# Patient Record
Sex: FEMALE | Race: WHITE | NOT HISPANIC OR LATINO | Employment: OTHER | URBAN - METROPOLITAN AREA
[De-identification: names, ages, dates, MRNs, and addresses within clinical notes are randomized per-mention and may not be internally consistent; named-entity substitution may affect disease eponyms.]

---

## 2017-12-02 ENCOUNTER — HOSPITAL ENCOUNTER (INPATIENT)
Facility: HOSPITAL | Age: 72
LOS: 4 days | Discharge: HOME HEALTH | DRG: 073 | End: 2017-12-06
Attending: EMERGENCY MEDICINE | Admitting: HOSPITALIST
Payer: MEDICARE

## 2017-12-02 DIAGNOSIS — R91.8 MASS OF RIGHT LUNG: ICD-10-CM

## 2017-12-02 DIAGNOSIS — R27.0 ATAXIA: Primary | ICD-10-CM

## 2017-12-02 DIAGNOSIS — R06.02 SOB (SHORTNESS OF BREATH): ICD-10-CM

## 2017-12-02 DIAGNOSIS — E43 SEVERE MALNUTRITION: ICD-10-CM

## 2017-12-02 LAB
ALBUMIN SERPL BCP-MCNC: 2.4 G/DL
ALP SERPL-CCNC: 54 U/L
ALT SERPL W/O P-5'-P-CCNC: 7 U/L
AMORPH CRY URNS QL MICRO: ABNORMAL
ANION GAP SERPL CALC-SCNC: 10 MMOL/L
AST SERPL-CCNC: 9 U/L
BACTERIA #/AREA URNS HPF: ABNORMAL /HPF
BASOPHILS # BLD AUTO: 0 K/UL
BASOPHILS NFR BLD: 0.4 %
BILIRUB SERPL-MCNC: 0.3 MG/DL
BILIRUB UR QL STRIP: NEGATIVE
BNP SERPL-MCNC: 101 PG/ML
BUN SERPL-MCNC: 10 MG/DL
CALCIUM SERPL-MCNC: 9.2 MG/DL
CHLORIDE SERPL-SCNC: 103 MMOL/L
CLARITY UR: ABNORMAL
CO2 SERPL-SCNC: 29 MMOL/L
COLOR UR: YELLOW
CREAT SERPL-MCNC: 0.8 MG/DL
DIFFERENTIAL METHOD: ABNORMAL
EOSINOPHIL # BLD AUTO: 0.2 K/UL
EOSINOPHIL NFR BLD: 1.8 %
ERYTHROCYTE [DISTWIDTH] IN BLOOD BY AUTOMATED COUNT: 13.7 %
EST. GFR  (AFRICAN AMERICAN): >60 ML/MIN/1.73 M^2
EST. GFR  (NON AFRICAN AMERICAN): >60 ML/MIN/1.73 M^2
FLUAV AG SPEC QL IA: NEGATIVE
FLUBV AG SPEC QL IA: NEGATIVE
GLUCOSE SERPL-MCNC: 114 MG/DL
GLUCOSE UR QL STRIP: NEGATIVE
HCT VFR BLD AUTO: 35.4 %
HGB BLD-MCNC: 11.7 G/DL
HGB UR QL STRIP: NEGATIVE
KETONES UR QL STRIP: NEGATIVE
LACTATE SERPL-SCNC: 0.8 MMOL/L
LEUKOCYTE ESTERASE UR QL STRIP: ABNORMAL
LYMPHOCYTES # BLD AUTO: 2.1 K/UL
LYMPHOCYTES NFR BLD: 22 %
MCH RBC QN AUTO: 30.7 PG
MCHC RBC AUTO-ENTMCNC: 33 G/DL
MCV RBC AUTO: 93 FL
MICROSCOPIC COMMENT: ABNORMAL
MONOCYTES # BLD AUTO: 0.3 K/UL
MONOCYTES NFR BLD: 3.5 %
NEUTROPHILS # BLD AUTO: 7 K/UL
NEUTROPHILS NFR BLD: 72.3 %
NITRITE UR QL STRIP: NEGATIVE
PH UR STRIP: 7 [PH] (ref 5–8)
PLATELET # BLD AUTO: 362 K/UL
PMV BLD AUTO: 7.1 FL
POTASSIUM SERPL-SCNC: 3.8 MMOL/L
PROT SERPL-MCNC: 6.9 G/DL
PROT UR QL STRIP: NEGATIVE
RBC # BLD AUTO: 3.81 M/UL
RBC #/AREA URNS HPF: 3 /HPF (ref 0–4)
SODIUM SERPL-SCNC: 142 MMOL/L
SP GR UR STRIP: 1.01 (ref 1–1.03)
SPECIMEN SOURCE: NORMAL
SQUAMOUS #/AREA URNS HPF: 23 /HPF
TROPONIN I SERPL DL<=0.01 NG/ML-MCNC: <0.006 NG/ML
URN SPEC COLLECT METH UR: ABNORMAL
UROBILINOGEN UR STRIP-ACNC: NEGATIVE EU/DL
WBC # BLD AUTO: 9.6 K/UL
WBC #/AREA URNS HPF: 32 /HPF (ref 0–5)

## 2017-12-02 PROCEDURE — 83880 ASSAY OF NATRIURETIC PEPTIDE: CPT

## 2017-12-02 PROCEDURE — 36415 COLL VENOUS BLD VENIPUNCTURE: CPT

## 2017-12-02 PROCEDURE — 85025 COMPLETE CBC W/AUTO DIFF WBC: CPT

## 2017-12-02 PROCEDURE — 93010 ELECTROCARDIOGRAM REPORT: CPT | Mod: ,,, | Performed by: INTERNAL MEDICINE

## 2017-12-02 PROCEDURE — 83605 ASSAY OF LACTIC ACID: CPT

## 2017-12-02 PROCEDURE — 12000002 HC ACUTE/MED SURGE SEMI-PRIVATE ROOM

## 2017-12-02 PROCEDURE — 87086 URINE CULTURE/COLONY COUNT: CPT

## 2017-12-02 PROCEDURE — 81000 URINALYSIS NONAUTO W/SCOPE: CPT

## 2017-12-02 PROCEDURE — 87186 SC STD MICRODIL/AGAR DIL: CPT

## 2017-12-02 PROCEDURE — 84484 ASSAY OF TROPONIN QUANT: CPT

## 2017-12-02 PROCEDURE — 80053 COMPREHEN METABOLIC PANEL: CPT

## 2017-12-02 PROCEDURE — 25000003 PHARM REV CODE 250: Performed by: HOSPITALIST

## 2017-12-02 PROCEDURE — G0378 HOSPITAL OBSERVATION PER HR: HCPCS

## 2017-12-02 PROCEDURE — 63600175 PHARM REV CODE 636 W HCPCS: Performed by: HOSPITALIST

## 2017-12-02 PROCEDURE — 87088 URINE BACTERIA CULTURE: CPT

## 2017-12-02 PROCEDURE — 93005 ELECTROCARDIOGRAM TRACING: CPT

## 2017-12-02 PROCEDURE — 87040 BLOOD CULTURE FOR BACTERIA: CPT | Mod: 59

## 2017-12-02 PROCEDURE — 87077 CULTURE AEROBIC IDENTIFY: CPT

## 2017-12-02 PROCEDURE — 87400 INFLUENZA A/B EACH AG IA: CPT | Mod: 59

## 2017-12-02 PROCEDURE — 99285 EMERGENCY DEPT VISIT HI MDM: CPT | Mod: 25

## 2017-12-02 PROCEDURE — 63600175 PHARM REV CODE 636 W HCPCS: Performed by: EMERGENCY MEDICINE

## 2017-12-02 RX ORDER — SODIUM CHLORIDE 9 MG/ML
INJECTION, SOLUTION INTRAVENOUS CONTINUOUS
Status: DISCONTINUED | OUTPATIENT
Start: 2017-12-02 | End: 2017-12-03

## 2017-12-02 RX ORDER — LANOLIN ALCOHOL/MO/W.PET/CERES
800 CREAM (GRAM) TOPICAL
Status: DISCONTINUED | OUTPATIENT
Start: 2017-12-02 | End: 2017-12-06 | Stop reason: HOSPADM

## 2017-12-02 RX ORDER — MIRTAZAPINE 15 MG/1
30 TABLET, FILM COATED ORAL NIGHTLY
Status: DISCONTINUED | OUTPATIENT
Start: 2017-12-02 | End: 2017-12-06 | Stop reason: HOSPADM

## 2017-12-02 RX ORDER — OXYCODONE AND ACETAMINOPHEN 10; 325 MG/1; MG/1
1 TABLET ORAL EVERY 8 HOURS PRN
Status: DISCONTINUED | OUTPATIENT
Start: 2017-12-02 | End: 2017-12-03

## 2017-12-02 RX ORDER — SODIUM CHLORIDE 0.9 % (FLUSH) 0.9 %
5 SYRINGE (ML) INJECTION
Status: DISCONTINUED | OUTPATIENT
Start: 2017-12-02 | End: 2017-12-06 | Stop reason: HOSPADM

## 2017-12-02 RX ORDER — IBUPROFEN 200 MG
16 TABLET ORAL
Status: DISCONTINUED | OUTPATIENT
Start: 2017-12-02 | End: 2017-12-06 | Stop reason: HOSPADM

## 2017-12-02 RX ORDER — ACETAMINOPHEN 325 MG/1
650 TABLET ORAL EVERY 6 HOURS PRN
Status: DISCONTINUED | OUTPATIENT
Start: 2017-12-02 | End: 2017-12-06 | Stop reason: HOSPADM

## 2017-12-02 RX ORDER — PREDNISONE 20 MG/1
60 TABLET ORAL
Status: COMPLETED | OUTPATIENT
Start: 2017-12-02 | End: 2017-12-02

## 2017-12-02 RX ORDER — GABAPENTIN 300 MG/1
300 CAPSULE ORAL 3 TIMES DAILY
Status: DISCONTINUED | OUTPATIENT
Start: 2017-12-02 | End: 2017-12-06 | Stop reason: HOSPADM

## 2017-12-02 RX ORDER — ENOXAPARIN SODIUM 100 MG/ML
40 INJECTION SUBCUTANEOUS EVERY 24 HOURS
Status: DISCONTINUED | OUTPATIENT
Start: 2017-12-02 | End: 2017-12-06 | Stop reason: HOSPADM

## 2017-12-02 RX ORDER — IPRATROPIUM BROMIDE AND ALBUTEROL SULFATE 2.5; .5 MG/3ML; MG/3ML
3 SOLUTION RESPIRATORY (INHALATION) EVERY 4 HOURS PRN
Status: DISCONTINUED | OUTPATIENT
Start: 2017-12-02 | End: 2017-12-02

## 2017-12-02 RX ORDER — ATENOLOL 25 MG/1
25 TABLET ORAL DAILY
COMMUNITY
End: 2018-12-19 | Stop reason: CLARIF

## 2017-12-02 RX ORDER — IPRATROPIUM BROMIDE AND ALBUTEROL SULFATE 2.5; .5 MG/3ML; MG/3ML
3 SOLUTION RESPIRATORY (INHALATION) EVERY 4 HOURS PRN
Status: DISCONTINUED | OUTPATIENT
Start: 2017-12-02 | End: 2017-12-06 | Stop reason: HOSPADM

## 2017-12-02 RX ORDER — IBUPROFEN 200 MG
24 TABLET ORAL
Status: DISCONTINUED | OUTPATIENT
Start: 2017-12-02 | End: 2017-12-06 | Stop reason: HOSPADM

## 2017-12-02 RX ORDER — DULOXETIN HYDROCHLORIDE 30 MG/1
90 CAPSULE, DELAYED RELEASE ORAL DAILY
Status: DISCONTINUED | OUTPATIENT
Start: 2017-12-03 | End: 2017-12-06 | Stop reason: HOSPADM

## 2017-12-02 RX ORDER — GLUCAGON 1 MG
1 KIT INJECTION
Status: DISCONTINUED | OUTPATIENT
Start: 2017-12-02 | End: 2017-12-06 | Stop reason: HOSPADM

## 2017-12-02 RX ORDER — POTASSIUM CHLORIDE 20 MEQ/15ML
60 SOLUTION ORAL
Status: DISCONTINUED | OUTPATIENT
Start: 2017-12-02 | End: 2017-12-06 | Stop reason: HOSPADM

## 2017-12-02 RX ORDER — ONDANSETRON 2 MG/ML
8 INJECTION INTRAMUSCULAR; INTRAVENOUS EVERY 8 HOURS PRN
Status: DISCONTINUED | OUTPATIENT
Start: 2017-12-02 | End: 2017-12-06 | Stop reason: HOSPADM

## 2017-12-02 RX ORDER — POTASSIUM CHLORIDE 20 MEQ/15ML
40 SOLUTION ORAL
Status: DISCONTINUED | OUTPATIENT
Start: 2017-12-02 | End: 2017-12-06 | Stop reason: HOSPADM

## 2017-12-02 RX ADMIN — PREDNISONE 60 MG: 20 TABLET ORAL at 04:12

## 2017-12-02 RX ADMIN — SODIUM CHLORIDE: 9 INJECTION, SOLUTION INTRAVENOUS at 07:12

## 2017-12-02 RX ADMIN — MIRTAZAPINE 30 MG: 15 TABLET, FILM COATED ORAL at 09:12

## 2017-12-02 RX ADMIN — GABAPENTIN 300 MG: 300 CAPSULE ORAL at 09:12

## 2017-12-02 RX ADMIN — OXYCODONE HYDROCHLORIDE AND ACETAMINOPHEN 1 TABLET: 10; 325 TABLET ORAL at 07:12

## 2017-12-03 LAB
ALBUMIN SERPL BCP-MCNC: 2.2 G/DL
ALP SERPL-CCNC: 51 U/L
ALT SERPL W/O P-5'-P-CCNC: 5 U/L
ANION GAP SERPL CALC-SCNC: 8 MMOL/L
AST SERPL-CCNC: 8 U/L
BASOPHILS # BLD AUTO: 0 K/UL
BASOPHILS NFR BLD: 0.2 %
BILIRUB SERPL-MCNC: 0.3 MG/DL
BUN SERPL-MCNC: 12 MG/DL
CALCIUM SERPL-MCNC: 9 MG/DL
CHLORIDE SERPL-SCNC: 106 MMOL/L
CO2 SERPL-SCNC: 26 MMOL/L
CREAT SERPL-MCNC: 0.7 MG/DL
DIFFERENTIAL METHOD: ABNORMAL
EOSINOPHIL # BLD AUTO: 0 K/UL
EOSINOPHIL NFR BLD: 0.1 %
ERYTHROCYTE [DISTWIDTH] IN BLOOD BY AUTOMATED COUNT: 13.6 %
EST. GFR  (AFRICAN AMERICAN): >60 ML/MIN/1.73 M^2
EST. GFR  (NON AFRICAN AMERICAN): >60 ML/MIN/1.73 M^2
GLUCOSE CSF-MCNC: 82 MG/DL
GLUCOSE SERPL-MCNC: 156 MG/DL
HCT VFR BLD AUTO: 34.1 %
HGB BLD-MCNC: 11.5 G/DL
LYMPHOCYTES # BLD AUTO: 1.2 K/UL
LYMPHOCYTES NFR BLD: 15.5 %
MAGNESIUM SERPL-MCNC: 1.6 MG/DL
MCH RBC QN AUTO: 31.2 PG
MCHC RBC AUTO-ENTMCNC: 33.7 G/DL
MCV RBC AUTO: 93 FL
MONOCYTES # BLD AUTO: 0.2 K/UL
MONOCYTES NFR BLD: 2 %
NEUTROPHILS # BLD AUTO: 6.6 K/UL
NEUTROPHILS NFR BLD: 82.2 %
PHOSPHATE SERPL-MCNC: 3.3 MG/DL
PLATELET # BLD AUTO: 336 K/UL
PMV BLD AUTO: 7.5 FL
POTASSIUM SERPL-SCNC: 4.2 MMOL/L
PROT CSF-MCNC: 57 MG/DL
PROT SERPL-MCNC: 6.5 G/DL
RBC # BLD AUTO: 3.68 M/UL
SODIUM SERPL-SCNC: 140 MMOL/L
WBC # BLD AUTO: 8.1 K/UL

## 2017-12-03 PROCEDURE — 89051 BODY FLUID CELL COUNT: CPT

## 2017-12-03 PROCEDURE — 87205 SMEAR GRAM STAIN: CPT

## 2017-12-03 PROCEDURE — 82945 GLUCOSE OTHER FLUID: CPT

## 2017-12-03 PROCEDURE — 36415 COLL VENOUS BLD VENIPUNCTURE: CPT

## 2017-12-03 PROCEDURE — 83036 HEMOGLOBIN GLYCOSYLATED A1C: CPT

## 2017-12-03 PROCEDURE — 009U3ZZ DRAINAGE OF SPINAL CANAL, PERCUTANEOUS APPROACH: ICD-10-PCS | Performed by: RADIOLOGY

## 2017-12-03 PROCEDURE — 25000003 PHARM REV CODE 250: Performed by: HOSPITALIST

## 2017-12-03 PROCEDURE — 87070 CULTURE OTHR SPECIMN AEROBIC: CPT

## 2017-12-03 PROCEDURE — 88108 CYTOPATH CONCENTRATE TECH: CPT | Performed by: PATHOLOGY

## 2017-12-03 PROCEDURE — 80053 COMPREHEN METABOLIC PANEL: CPT

## 2017-12-03 PROCEDURE — 83735 ASSAY OF MAGNESIUM: CPT

## 2017-12-03 PROCEDURE — 85025 COMPLETE CBC W/AUTO DIFF WBC: CPT

## 2017-12-03 PROCEDURE — 84100 ASSAY OF PHOSPHORUS: CPT

## 2017-12-03 PROCEDURE — 99900035 HC TECH TIME PER 15 MIN (STAT)

## 2017-12-03 PROCEDURE — 84157 ASSAY OF PROTEIN OTHER: CPT

## 2017-12-03 PROCEDURE — 99000 SPECIMEN HANDLING OFFICE-LAB: CPT

## 2017-12-03 PROCEDURE — 12000002 HC ACUTE/MED SURGE SEMI-PRIVATE ROOM

## 2017-12-03 PROCEDURE — 63600175 PHARM REV CODE 636 W HCPCS: Performed by: HOSPITALIST

## 2017-12-03 RX ORDER — TRAZODONE HYDROCHLORIDE 50 MG/1
100 TABLET ORAL NIGHTLY
Status: DISCONTINUED | OUTPATIENT
Start: 2017-12-03 | End: 2017-12-06 | Stop reason: HOSPADM

## 2017-12-03 RX ORDER — OXYCODONE AND ACETAMINOPHEN 10; 325 MG/1; MG/1
1 TABLET ORAL EVERY 4 HOURS PRN
Status: DISCONTINUED | OUTPATIENT
Start: 2017-12-03 | End: 2017-12-06 | Stop reason: HOSPADM

## 2017-12-03 RX ADMIN — GABAPENTIN 300 MG: 300 CAPSULE ORAL at 09:12

## 2017-12-03 RX ADMIN — MIRTAZAPINE 30 MG: 15 TABLET, FILM COATED ORAL at 08:12

## 2017-12-03 RX ADMIN — TRAZODONE HYDROCHLORIDE 100 MG: 50 TABLET ORAL at 08:12

## 2017-12-03 RX ADMIN — OXYCODONE HYDROCHLORIDE AND ACETAMINOPHEN 1 TABLET: 10; 325 TABLET ORAL at 05:12

## 2017-12-03 RX ADMIN — DULOXETINE HYDROCHLORIDE 90 MG: 30 CAPSULE, DELAYED RELEASE ORAL at 09:12

## 2017-12-03 RX ADMIN — GABAPENTIN 300 MG: 300 CAPSULE ORAL at 01:12

## 2017-12-03 RX ADMIN — GABAPENTIN 300 MG: 300 CAPSULE ORAL at 05:12

## 2017-12-03 RX ADMIN — ENOXAPARIN SODIUM 40 MG: 100 INJECTION SUBCUTANEOUS at 05:12

## 2017-12-03 RX ADMIN — OXYCODONE HYDROCHLORIDE AND ACETAMINOPHEN 1 TABLET: 10; 325 TABLET ORAL at 01:12

## 2017-12-03 RX ADMIN — OXYCODONE HYDROCHLORIDE AND ACETAMINOPHEN 1 TABLET: 10; 325 TABLET ORAL at 08:12

## 2017-12-03 NOTE — ASSESSMENT & PLAN NOTE
Patient with wide based shuffling gait, falls, and memory issues with incontinence. Potentially NPH. Will check CT head and goal for LP under flouro. PT/OT eval. Also may be attributed to high dose gabapentin.

## 2017-12-03 NOTE — ASSESSMENT & PLAN NOTE
Nutrition consulted. Body mass index is 21.13 kg/m².. Encourage maximal PO intake. Diet supplementation ordered per nutrition approval. Will encourage PO and monitor closely for weight changes.

## 2017-12-03 NOTE — PROGRESS NOTES
"    Progress Note  [facility] Medicine    Admit Date: 12/2/2017    SUBJECTIVE:     Follow-up For:  Ataxia      Interval history (See H&P for complete P,F,SHx) :  Patient CT reviewed, normal. Refused PT d/t foot pain. Appears depressed. Refused lovenox. Underwent LP with improvement of ataxia. Specifically requesting narcotics for foot pain.    Review of Systems: Systems were reviewed and otherwise negative unless indicated below.  Pain scale: 5/10  Gen- Weakness/ Fatigue  Extrem- Pain/Swelling        OBJECTIVE:     Vital Signs Range (Last 24H):  Temp:  [97 °F (36.1 °C)-98.4 °F (36.9 °C)]   Pulse:  [58-74]   Resp:  [15-18]   BP: (101-132)/(49-72)   SpO2:  [94 %-98 %]     I & O (Last 24H):    Intake/Output Summary (Last 24 hours) at 12/03/17 2024  Last data filed at 12/03/17 1900   Gross per 24 hour   Intake          3257.08 ml   Output             1950 ml   Net          1307.08 ml       Estimated body mass index is 21.13 kg/m² as calculated from the following:    Height as of this encounter: 5' 5" (1.651 m).    Weight as of this encounter: 57.6 kg (127 lb).    Physical Exam:  General- Patient alert and oriented x3 in NAD  HEENT- PERRLA, EOMI, OP clear, MMM  Neck- No JVD, Lymphadenopathy, Thyromegaly  CV- Regular rate and rhythm, No Murmur/dai/rubs  Resp- Lungs CTA Bilaterally, No increased WOB  GI- Non tender/non-distended, BS normoactive x4 quads, no HSM  Extrem- No cyanosis, clubbing, edema. Pulses 2+ and symmetric  Neuro- Strength 5/5 flexors/extensors, DTRs 2+ and symmetric, Intact sensation to light touch grossly. Noted ataxic gait, shuffling with improved after CSF withdrawn.  Skin-  No rashes, masses or lesions noted    Laboratory/Diagnostic Data:  Reviewed and noted in plan where applicable- Please see chart for full lab data.    Medications:  Medication list was reviewed and changes noted under Assessment/Plan.    ASSESSMENT/PLAN:     Active Problems:    Active Hospital Problems    Diagnosis "  POA    *Ataxia [R27.0]-  Patient with ataxia, markedly improved after LP. Continue PT/OT. Pain meds. Monitor in hospital, likely home in AM. Refer to outpatient neurology.  Yes    Opiate dependence-  Discussed with daughter. Will need to decrease prior to discharge.      Depression, major-  Add trazodone. Continue regular anti-depressants.      Mass of right lung [R91.8]-  No inpatient Tx needed. Refer to outpatient pulmonology, Dr. Mora.  Yes    Severe malnutrition [E43]- Nutrition consulted. Body mass index is 21.13 kg/m².. Encourage maximal PO intake. Diet supplementation ordered per nutrition approval. Will encourage PO and monitor closely for weight changes.  Yes      Resolved Hospital Problems    Diagnosis Date Resolved POA   No resolved problems to display.       Disposition- Home    VTE Risk Mitigation         Ordered     enoxaparin injection 40 mg  Daily     Route:  Subcutaneous        12/02/17 1913     Medium Risk of VTE  Once      12/02/17 1913

## 2017-12-03 NOTE — PLAN OF CARE
Problem: Patient Care Overview  Goal: Plan of Care Review  Plan of care reviewed with pt/granddaughter during admission- IVF, CT head ordered, lab workup.  Pt/family verbalized understanding. Hourly/ Q2 hourly rounds completed on this pt throughout shift.  Pain monitored and covered as needed, up to restroom with assist, repositions self, safety maintained.  Patient has remained free from fall/injury, no skin breakdown noted.  Side rails up x2, bed in locked and lowest position, bed alarm set, call light kept within reach.  Needs attended to, will continue to monitor/ update as indicated

## 2017-12-03 NOTE — H&P
"[facility]  History & Physical    Patient Name: Queta Ocampo  MRN: [id number]  Admission Date: 12/2/2017  Attending Physician: Chance Benson MD  Primary Care Provider: Gregory Jauregui Iv, MD         Patient information was obtained from patient and ER records.     Subjective:     Principal Problem:Ataxia    Chief Complaint:   Chief Complaint   Patient presents with    Shortness of Breath     Dx with pneumonia on 11/27/17 @ [facility] in [place], [place] - also discovered "lung mass" @ that time. Continues to be with difficult breathing since that time        HPI: Queta Ocampo is a 72 y.o. female with who presents to the ED via EMS with an onset of worsening SOB and weakness/falls x2-3 days. The patient reports that she was discharged from [facility], where she was diagnosed with PNA and a lung mass. She was prescribed Z-Shade, which she finished today. The patient states that she currently smokes about a half a pack of cigarettes a day, and she uses an albuterol inhaler every 4 hours at home. The patient's caretaker states that she has refused to get out of bed or to eat for the past few days. The patient endorses 1 episode of diarrhea, but denies fever, vomiting, blood in stool, hematuria, dysuria, chest pain, and abdominal pain. There is no pertinent SHx noted.    Past Medical History:   Diagnosis Date    Depression     Nervous breakdown        Past Surgical History:   Procedure Laterality Date    APPENDECTOMY      TUBAL LIGATION         Review of patient's allergies indicates:   Allergen Reactions    Codeine Itching, Swelling, Rash and Other (See Comments)     Facial swelling, itching, rash, erythema    Pcn [penicillins] Itching, Swelling, Rash and Other (See Comments)     Facial swelling with rash, erythema, itching       No current facility-administered medications on file prior to encounter.      Current Outpatient Prescriptions on File Prior to Encounter "   Medication Sig    duloxetine (CYMBALTA) 60 MG capsule Take 90 mg by mouth once daily.     gabapentin (NEURONTIN) 600 MG tablet     mirtazapine (REMERON) 30 MG tablet     oxycodone-acetaminophen (PERCOCET)  mg per tablet TAKE 1 TABLET BY MOUTH EVERY 8 HOURS AS NEEDED FOR 20 DAYS    pantoprazole (PROTONIX) 20 MG tablet Take 1 tablet (20 mg total) by mouth once daily.    quetiapine (SEROQUEL) 50 MG tablet Take 150 mg by mouth every evening.    [DISCONTINUED] atenolol (TENORMIN) 25 MG tablet Take 25 mg by mouth once daily.    [DISCONTINUED] buPROPion (WELLBUTRIN XL) 150 MG TB24 tablet Take 150 mg by mouth every morning.    [DISCONTINUED] cefUROXime (CEFTIN) 250 MG tablet     [DISCONTINUED] clonazePAM (KLONOPIN) 0.5 MG tablet Take 0.5 mg by mouth 2 (two) times daily as needed for Anxiety.    [DISCONTINUED] cloNIDine (CATAPRES) 0.1 MG tablet Take 0.1 mg by mouth nightly.    [DISCONTINUED] diphenoxylate-atropine 2.5-0.025 mg (LOMOTIL) 2.5-0.025 mg per tablet     [DISCONTINUED] gabapentin (NEURONTIN) 300 MG capsule Take 300 mg by mouth 3 (three) times daily.    [DISCONTINUED] HYDROmorphone (DILAUDID) 4 MG tablet Take 4 mg by mouth every 6 (six) hours.    [DISCONTINUED] ketorolac (TORADOL) 10 mg tablet Take 1 tablet (10 mg total) by mouth every 8 (eight) hours as needed for Pain.    [DISCONTINUED] levofloxacin (LEVAQUIN) 500 MG tablet Take 500 mg by mouth once daily.    [DISCONTINUED] oxycodone (OXYCONTIN) 20 mg 12 hr tablet Take 20 mg by mouth every 12 (twelve) hours.    [DISCONTINUED] SEROQUEL  mg Tb24     [DISCONTINUED] sertraline (ZOLOFT) 25 MG tablet     [DISCONTINUED] sertraline (ZOLOFT) 50 MG tablet Take 50 mg by mouth once daily.    [DISCONTINUED] VENTOLIN HFA 90 mcg/actuation inhaler      Family History     None        Social History Main Topics    Smoking status: Current Every Day Smoker     Packs/day: 0.50     Types: Cigarettes    Smokeless tobacco: Not on file    Alcohol use  Not on file    Drug use: Unknown    Sexual activity: Not on file     Review of Systems   Constitutional: Positive for appetite change and fatigue. Negative for activity change and fever.   HENT: Negative for ear discharge, facial swelling and sore throat.    Eyes: Negative for photophobia, pain and visual disturbance.   Respiratory: Positive for cough and shortness of breath. Negative for apnea.    Cardiovascular: Negative for chest pain and leg swelling.   Gastrointestinal: Negative for abdominal pain and blood in stool.   Endocrine: Negative for cold intolerance and heat intolerance.   Musculoskeletal: Positive for gait problem and myalgias. Negative for back pain.   Skin: Negative for pallor and rash.   Neurological: Positive for weakness. Negative for speech difficulty and headaches.   Psychiatric/Behavioral: Negative for confusion, hallucinations and suicidal ideas.   All other systems reviewed and are negative.    Objective:     Vital Signs (Most Recent):  Temp: 98.4 °F (36.9 °C) (12/02/17 1927)  Pulse: 71 (12/02/17 1927)  Resp: 16 (12/02/17 1927)  BP: 134/79 (12/02/17 1927)  SpO2: (!) 93 % (12/02/17 1927) Vital Signs (24h Range):  Temp:  [98.4 °F (36.9 °C)-99.3 °F (37.4 °C)] 98.4 °F (36.9 °C)  Pulse:  [66-74] 71  Resp:  [16-18] 16  SpO2:  [90 %-96 %] 93 %  BP: (108-145)/(51-79) 134/79     Weight: 57.6 kg (127 lb)  Body mass index is 21.13 kg/m².    Physical Exam   Constitutional: She is oriented to person, place, and time. She appears well-developed and well-nourished.   HENT:   Head: Normocephalic and atraumatic.   Eyes: EOM are normal. Pupils are equal, round, and reactive to light.   Neck: Neck supple. No JVD present.   Cardiovascular: Normal rate and regular rhythm.  Exam reveals no gallop and no friction rub.    No murmur heard.  Pulmonary/Chest: Effort normal and breath sounds normal. She has no wheezes. She has no rales.   Abdominal: Soft. Bowel sounds are normal. She exhibits no distension. There is  no tenderness.   Musculoskeletal: She exhibits no edema or tenderness.   Lymphadenopathy:     She has no cervical adenopathy.   Neurological: She is alert and oriented to person, place, and time. She has normal reflexes. No cranial nerve deficit. Coordination abnormal.   Noted short shuffling ataxic gait. No focal deficits noted.   Skin: No rash noted. No erythema.   Psychiatric: She has a normal mood and affect.   Nursing note and vitals reviewed.        CRANIAL NERVES     CN III, IV, VI   Pupils are equal, round, and reactive to light.  Extraocular motions are normal.        Significant Labs: All pertinent labs within the past 24 hours have been reviewed.    Significant Imaging: I have reviewed all pertinent imaging results/findings within the past 24 hours.    Assessment/Plan:     * Ataxia    Patient with wide based shuffling gait, falls, and memory issues with incontinence. Potentially NPH. Will check CT head and goal for LP under flouro. PT/OT eval. Also may be attributed to high dose gabapentin.          Severe malnutrition    Nutrition consulted. Body mass index is 21.13 kg/m².. Encourage maximal PO intake. Diet supplementation ordered per nutrition approval. Will encourage PO and monitor closely for weight changes.            Mass of right lung    Patient will need F/U with oncology as outpatient.            VTE Risk Mitigation         Ordered     enoxaparin injection 40 mg  Daily     Route:  Subcutaneous        12/02/17 1913     Medium Risk of VTE  Once      12/02/17 1913             Chance Benson MD  Department of Hospital Medicine   [facility]

## 2017-12-03 NOTE — PT/OT/SLP PROGRESS
"Physical Therapy      Queta Ocampo  MRN: [id number]    Patient not seen today secondary to pt. Refusal due to pain in the R foot " 10/10". Will follow-up 12/4/17.    Severiano Alejandro, PT    "

## 2017-12-03 NOTE — HPI
Queta Ocampo is a 72 y.o. female with who presents to the ED via EMS with an onset of worsening SOB and weakness/falls x2-3 days. The patient reports that she was discharged from [facility], where she was diagnosed with PNA and a lung mass. She was prescribed Z-Shade, which she finished today. The patient states that she currently smokes about a half a pack of cigarettes a day, and she uses an albuterol inhaler every 4 hours at home. The patient's caretaker states that she has refused to get out of bed or to eat for the past few days. The patient endorses 1 episode of diarrhea, but denies fever, vomiting, blood in stool, hematuria, dysuria, chest pain, and abdominal pain. There is no pertinent SHx noted.

## 2017-12-03 NOTE — SUBJECTIVE & OBJECTIVE
Past Medical History:   Diagnosis Date    Depression     Nervous breakdown        Past Surgical History:   Procedure Laterality Date    APPENDECTOMY      TUBAL LIGATION         Review of patient's allergies indicates:   Allergen Reactions    Codeine Itching, Swelling, Rash and Other (See Comments)     Facial swelling, itching, rash, erythema    Pcn [penicillins] Itching, Swelling, Rash and Other (See Comments)     Facial swelling with rash, erythema, itching       No current facility-administered medications on file prior to encounter.      Current Outpatient Prescriptions on File Prior to Encounter   Medication Sig    duloxetine (CYMBALTA) 60 MG capsule Take 90 mg by mouth once daily.     gabapentin (NEURONTIN) 600 MG tablet     mirtazapine (REMERON) 30 MG tablet     oxycodone-acetaminophen (PERCOCET)  mg per tablet TAKE 1 TABLET BY MOUTH EVERY 8 HOURS AS NEEDED FOR 20 DAYS    pantoprazole (PROTONIX) 20 MG tablet Take 1 tablet (20 mg total) by mouth once daily.    quetiapine (SEROQUEL) 50 MG tablet Take 150 mg by mouth every evening.    [DISCONTINUED] atenolol (TENORMIN) 25 MG tablet Take 25 mg by mouth once daily.    [DISCONTINUED] buPROPion (WELLBUTRIN XL) 150 MG TB24 tablet Take 150 mg by mouth every morning.    [DISCONTINUED] cefUROXime (CEFTIN) 250 MG tablet     [DISCONTINUED] clonazePAM (KLONOPIN) 0.5 MG tablet Take 0.5 mg by mouth 2 (two) times daily as needed for Anxiety.    [DISCONTINUED] cloNIDine (CATAPRES) 0.1 MG tablet Take 0.1 mg by mouth nightly.    [DISCONTINUED] diphenoxylate-atropine 2.5-0.025 mg (LOMOTIL) 2.5-0.025 mg per tablet     [DISCONTINUED] gabapentin (NEURONTIN) 300 MG capsule Take 300 mg by mouth 3 (three) times daily.    [DISCONTINUED] HYDROmorphone (DILAUDID) 4 MG tablet Take 4 mg by mouth every 6 (six) hours.    [DISCONTINUED] ketorolac (TORADOL) 10 mg tablet Take 1 tablet (10 mg total) by mouth every 8 (eight) hours as needed for Pain.    [DISCONTINUED]  levofloxacin (LEVAQUIN) 500 MG tablet Take 500 mg by mouth once daily.    [DISCONTINUED] oxycodone (OXYCONTIN) 20 mg 12 hr tablet Take 20 mg by mouth every 12 (twelve) hours.    [DISCONTINUED] SEROQUEL  mg Tb24     [DISCONTINUED] sertraline (ZOLOFT) 25 MG tablet     [DISCONTINUED] sertraline (ZOLOFT) 50 MG tablet Take 50 mg by mouth once daily.    [DISCONTINUED] VENTOLIN HFA 90 mcg/actuation inhaler      Family History     None        Social History Main Topics    Smoking status: Current Every Day Smoker     Packs/day: 0.50     Types: Cigarettes    Smokeless tobacco: Not on file    Alcohol use Not on file    Drug use: Unknown    Sexual activity: Not on file     Review of Systems   Constitutional: Positive for appetite change and fatigue. Negative for activity change and fever.   HENT: Negative for ear discharge, facial swelling and sore throat.    Eyes: Negative for photophobia, pain and visual disturbance.   Respiratory: Positive for cough and shortness of breath. Negative for apnea.    Cardiovascular: Negative for chest pain and leg swelling.   Gastrointestinal: Negative for abdominal pain and blood in stool.   Endocrine: Negative for cold intolerance and heat intolerance.   Musculoskeletal: Positive for gait problem and myalgias. Negative for back pain.   Skin: Negative for pallor and rash.   Neurological: Positive for weakness. Negative for speech difficulty and headaches.   Psychiatric/Behavioral: Negative for confusion, hallucinations and suicidal ideas.   All other systems reviewed and are negative.    Objective:     Vital Signs (Most Recent):  Temp: 98.4 °F (36.9 °C) (12/02/17 1927)  Pulse: 71 (12/02/17 1927)  Resp: 16 (12/02/17 1927)  BP: 134/79 (12/02/17 1927)  SpO2: (!) 93 % (12/02/17 1927) Vital Signs (24h Range):  Temp:  [98.4 °F (36.9 °C)-99.3 °F (37.4 °C)] 98.4 °F (36.9 °C)  Pulse:  [66-74] 71  Resp:  [16-18] 16  SpO2:  [90 %-96 %] 93 %  BP: (108-145)/(51-79) 134/79     Weight: 57.6 kg  (127 lb)  Body mass index is 21.13 kg/m².    Physical Exam   Constitutional: She is oriented to person, place, and time. She appears well-developed and well-nourished.   HENT:   Head: Normocephalic and atraumatic.   Eyes: EOM are normal. Pupils are equal, round, and reactive to light.   Neck: Neck supple. No JVD present.   Cardiovascular: Normal rate and regular rhythm.  Exam reveals no gallop and no friction rub.    No murmur heard.  Pulmonary/Chest: Effort normal and breath sounds normal. She has no wheezes. She has no rales.   Abdominal: Soft. Bowel sounds are normal. She exhibits no distension. There is no tenderness.   Musculoskeletal: She exhibits no edema or tenderness.   Lymphadenopathy:     She has no cervical adenopathy.   Neurological: She is alert and oriented to person, place, and time. She has normal reflexes. No cranial nerve deficit. Coordination abnormal.   Noted short shuffling ataxic gait. No focal deficits noted.   Skin: No rash noted. No erythema.   Psychiatric: She has a normal mood and affect.   Nursing note and vitals reviewed.        CRANIAL NERVES     CN III, IV, VI   Pupils are equal, round, and reactive to light.  Extraocular motions are normal.        Significant Labs: All pertinent labs within the past 24 hours have been reviewed.    Significant Imaging: I have reviewed all pertinent imaging results/findings within the past 24 hours.

## 2017-12-04 LAB
ALBUMIN SERPL BCP-MCNC: 2.1 G/DL
ALP SERPL-CCNC: 43 U/L
ALT SERPL W/O P-5'-P-CCNC: 6 U/L
ANION GAP SERPL CALC-SCNC: 7 MMOL/L
AST SERPL-CCNC: 7 U/L
BASOPHILS # BLD AUTO: 0 K/UL
BASOPHILS NFR BLD: 0.2 %
BILIRUB SERPL-MCNC: 0.2 MG/DL
BUN SERPL-MCNC: 12 MG/DL
CALCIUM SERPL-MCNC: 8.4 MG/DL
CHLORIDE SERPL-SCNC: 110 MMOL/L
CLARITY CSF: CLEAR
CO2 SERPL-SCNC: 24 MMOL/L
COLOR CSF: COLORLESS
CREAT SERPL-MCNC: 0.8 MG/DL
DIFFERENTIAL METHOD: ABNORMAL
EOSINOPHIL # BLD AUTO: 0.2 K/UL
EOSINOPHIL NFR BLD: 3 %
ERYTHROCYTE [DISTWIDTH] IN BLOOD BY AUTOMATED COUNT: 14.1 %
EST. GFR  (AFRICAN AMERICAN): >60 ML/MIN/1.73 M^2
EST. GFR  (NON AFRICAN AMERICAN): >60 ML/MIN/1.73 M^2
ESTIMATED AVG GLUCOSE: 117 MG/DL
FOLATE SERPL-MCNC: 8.5 NG/ML
GLUCOSE SERPL-MCNC: 85 MG/DL
HBA1C MFR BLD HPLC: 5.7 %
HCT VFR BLD AUTO: 31 %
HGB BLD-MCNC: 10.3 G/DL
LYMPHOCYTES # BLD AUTO: 3.5 K/UL
LYMPHOCYTES NFR BLD: 50.6 %
MAGNESIUM SERPL-MCNC: 1.4 MG/DL
MCH RBC QN AUTO: 30.9 PG
MCHC RBC AUTO-ENTMCNC: 33.2 G/DL
MCV RBC AUTO: 93 FL
MONOCYTES # BLD AUTO: 0.4 K/UL
MONOCYTES NFR BLD: 5.3 %
NEUTROPHILS # BLD AUTO: 2.8 K/UL
NEUTROPHILS NFR BLD: 40.9 %
PHOSPHATE SERPL-MCNC: 2.9 MG/DL
PLATELET # BLD AUTO: 306 K/UL
PMV BLD AUTO: 6.9 FL
POTASSIUM SERPL-SCNC: 3.4 MMOL/L
PROT SERPL-MCNC: 5.8 G/DL
RBC # BLD AUTO: 3.32 M/UL
RBC # CSF: 1 /CU MM
SODIUM SERPL-SCNC: 141 MMOL/L
SPECIMEN VOL CSF: 20 ML
TSH SERPL DL<=0.005 MIU/L-ACNC: 2.24 UIU/ML
VIT B12 SERPL-MCNC: 583 PG/ML
WBC # BLD AUTO: 6.9 K/UL
WBC # CSF: 3 /CU MM

## 2017-12-04 PROCEDURE — 97116 GAIT TRAINING THERAPY: CPT

## 2017-12-04 PROCEDURE — G8988 SELF CARE GOAL STATUS: HCPCS | Mod: CK

## 2017-12-04 PROCEDURE — 99900035 HC TECH TIME PER 15 MIN (STAT)

## 2017-12-04 PROCEDURE — 25000003 PHARM REV CODE 250: Performed by: INTERNAL MEDICINE

## 2017-12-04 PROCEDURE — 84100 ASSAY OF PHOSPHORUS: CPT

## 2017-12-04 PROCEDURE — 84443 ASSAY THYROID STIM HORMONE: CPT

## 2017-12-04 PROCEDURE — 25000003 PHARM REV CODE 250: Performed by: HOSPITALIST

## 2017-12-04 PROCEDURE — G8987 SELF CARE CURRENT STATUS: HCPCS | Mod: CK

## 2017-12-04 PROCEDURE — 82746 ASSAY OF FOLIC ACID SERUM: CPT

## 2017-12-04 PROCEDURE — 97162 PT EVAL MOD COMPLEX 30 MIN: CPT

## 2017-12-04 PROCEDURE — 97802 MEDICAL NUTRITION INDIV IN: CPT | Performed by: DIETITIAN, REGISTERED

## 2017-12-04 PROCEDURE — 82607 VITAMIN B-12: CPT

## 2017-12-04 PROCEDURE — 83735 ASSAY OF MAGNESIUM: CPT

## 2017-12-04 PROCEDURE — 85025 COMPLETE CBC W/AUTO DIFF WBC: CPT

## 2017-12-04 PROCEDURE — 97166 OT EVAL MOD COMPLEX 45 MIN: CPT

## 2017-12-04 PROCEDURE — 36415 COLL VENOUS BLD VENIPUNCTURE: CPT

## 2017-12-04 PROCEDURE — 63600175 PHARM REV CODE 636 W HCPCS: Performed by: HOSPITALIST

## 2017-12-04 PROCEDURE — 12000002 HC ACUTE/MED SURGE SEMI-PRIVATE ROOM

## 2017-12-04 PROCEDURE — 86592 SYPHILIS TEST NON-TREP QUAL: CPT

## 2017-12-04 PROCEDURE — 99232 SBSQ HOSP IP/OBS MODERATE 35: CPT | Mod: ,,, | Performed by: INTERNAL MEDICINE

## 2017-12-04 PROCEDURE — 80053 COMPREHEN METABOLIC PANEL: CPT

## 2017-12-04 PROCEDURE — 97535 SELF CARE MNGMENT TRAINING: CPT

## 2017-12-04 RX ORDER — POTASSIUM CHLORIDE 20 MEQ/1
40 TABLET, EXTENDED RELEASE ORAL ONCE
Status: COMPLETED | OUTPATIENT
Start: 2017-12-04 | End: 2017-12-04

## 2017-12-04 RX ORDER — CIPROFLOXACIN 500 MG/1
500 TABLET ORAL EVERY 12 HOURS
Status: DISCONTINUED | OUTPATIENT
Start: 2017-12-04 | End: 2017-12-06 | Stop reason: HOSPADM

## 2017-12-04 RX ADMIN — CIPROFLOXACIN 500 MG: 500 TABLET, FILM COATED ORAL at 08:12

## 2017-12-04 RX ADMIN — POTASSIUM CHLORIDE 40 MEQ: 1500 TABLET, EXTENDED RELEASE ORAL at 10:12

## 2017-12-04 RX ADMIN — CIPROFLOXACIN 500 MG: 500 TABLET, FILM COATED ORAL at 10:12

## 2017-12-04 RX ADMIN — ENOXAPARIN SODIUM 40 MG: 100 INJECTION SUBCUTANEOUS at 04:12

## 2017-12-04 RX ADMIN — TRAZODONE HYDROCHLORIDE 100 MG: 50 TABLET ORAL at 08:12

## 2017-12-04 RX ADMIN — OXYCODONE HYDROCHLORIDE AND ACETAMINOPHEN 1 TABLET: 10; 325 TABLET ORAL at 12:12

## 2017-12-04 RX ADMIN — Medication 800 MG: at 12:12

## 2017-12-04 RX ADMIN — MIRTAZAPINE 30 MG: 15 TABLET, FILM COATED ORAL at 08:12

## 2017-12-04 RX ADMIN — OXYCODONE HYDROCHLORIDE AND ACETAMINOPHEN 1 TABLET: 10; 325 TABLET ORAL at 04:12

## 2017-12-04 RX ADMIN — GABAPENTIN 300 MG: 300 CAPSULE ORAL at 08:12

## 2017-12-04 RX ADMIN — GABAPENTIN 300 MG: 300 CAPSULE ORAL at 02:12

## 2017-12-04 RX ADMIN — OXYCODONE HYDROCHLORIDE AND ACETAMINOPHEN 1 TABLET: 10; 325 TABLET ORAL at 07:12

## 2017-12-04 RX ADMIN — Medication 800 MG: at 04:12

## 2017-12-04 RX ADMIN — GABAPENTIN 300 MG: 300 CAPSULE ORAL at 06:12

## 2017-12-04 RX ADMIN — OXYCODONE HYDROCHLORIDE AND ACETAMINOPHEN 1 TABLET: 10; 325 TABLET ORAL at 03:12

## 2017-12-04 RX ADMIN — OXYCODONE HYDROCHLORIDE AND ACETAMINOPHEN 1 TABLET: 10; 325 TABLET ORAL at 08:12

## 2017-12-04 RX ADMIN — DULOXETINE HYDROCHLORIDE 90 MG: 30 CAPSULE, DELAYED RELEASE ORAL at 08:12

## 2017-12-04 RX ADMIN — Medication 800 MG: at 08:12

## 2017-12-04 NOTE — PLAN OF CARE
Problem: Occupational Therapy Goal  Goal: Occupational Therapy Goal  Goals to be met by: 12/18/17     Patient will increase functional independence with ADLs by performing:    Grooming while standing at sink with Supervision and Assistive Devices as needed.  Toileting from toilet with Supervision and Assistive Devices as needed for hygiene and clothing management.   Toilet transfer to toilet with Supervision.  Upper extremity exercise program x15 reps per handout, with supervision.    Outcome: Ongoing (interventions implemented as appropriate)  OT evaluation completed today. Goals & care plan established.    AISLINN Franco  12/4/2017

## 2017-12-04 NOTE — PLAN OF CARE
Problem: Patient Care Overview  Goal: Plan of Care Review  Outcome: Ongoing (interventions implemented as appropriate)  Plan of care reviewed with pt/granddaughter at start of shift- continued monitoring, neurology consult ordered.  Pt/family verbalized understanding. Hourly/ Q2 hourly rounds completed on this pt throughout shift.  Pain monitored and covered as needed for c/o chronic pain to R foot, up to restroom with assist, gait steady- pt reports minimal weakness.Repositions self, safety maintained.  Patient has remained free from fall/injury, no skin breakdown noted.  Side rails up x2, bed in locked and lowest position, bed alarm set, call light kept within reach.  Needs attended to, will continue to monitor/ update as indicated

## 2017-12-04 NOTE — PT/OT/SLP EVAL
"Physical Therapy Evaluation    Patient Name:  Queta Ocampo   MRN:  [id number]    Recommendations:     Discharge Recommendations:  home health PT   Discharge Equipment Recommendations: none (Pt has needed equipment)   Barriers to discharge: None    Assessment:     Queta Ocampo is a 72 y.o. female admitted with a medical diagnosis of Ataxia.  She presents with the following impairments/functional limitations:  impaired endurance, impaired functional mobilty, gait instability, impaired balance, decreased lower extremity function, pain, edema that limit her independence with functional mobility and gait. She demonstrated decreased ability to ambulate while using a RW compared to with no AD. However, based on her TUG time of 21.6 seconds and her impaired balance during gait, she would benefit from use of Rollator with training.     Rehab Prognosis:  good; patient would benefit from acute skilled PT services to address these deficits and reach maximum level of function.      Recent Surgery: * No surgery found *      Plan:     During this hospitalization, patient to be seen 6 x/week to address the above listed problems via gait training, therapeutic activities, therapeutic exercises  · Plan of Care Expires:  01/02/18   Plan of Care Reviewed with: patient    Subjective     Communicated with RN prior to session.  Patient found HOB elevated upon PT entry to room, agreeable to evaluation.      Chief Complaint: general LOB  Patient comments/goals: "For about a month I have felt unsteady."  Pain/Comfort:  · Pain Rating 1: 9/10  · Location - Side 1: Right  · Location - Orientation 1: generalized  · Location 1: foot  · Pain Addressed 1: Reposition, Distraction  · Pain Rating Post-Intervention 1:  (Pt did not rate)    Patients cultural, spiritual, Catholic conflicts given the current situation: none    Living Environment:  Pt lives in a [facility] with 1 TAYLOR. PTA her grandson helped her with needs and she used her rollator when "needed." " "Her grandson lives with her and is able to help her PRN.  Patient has the following equipment: rollator, shower chair (Uses rollator "if necessary").  Upon discharge, patient will have assistance from grandchildren.    Objective:     Patient found with:  (No lines)     General Precautions: Standard, fall     Exams:  · Cognitive Exam:  Patient is oriented to Person, Place, Time and Situation  · Fine Motor Coordination:    · -       Intact  Left hand, finger to nose, Right hand, finger to nose, Left hand thumb/finger opposition skills, Right hand thumb/finger opposition skills and Rapid alternating ankle DF/PF  · Sensation:    · -       Impaired  hypersensitive to touch on R foot  · Skin Integrity/Edema:      · -       Edema: Moderate R ankle  · RLE ROM: WFL  · RLE Strength: WFL except R ankle DF/PF not tested 2/2 pain  · LLE ROM: WFL  · LLE Strength: 4/5 throughout    Functional Mobility:  · Bed Mobility:      · Supine to Sit: modified independence  · Sit to Supine: independence  · Transfers:     · Sit to Stand:  supervision with no AD  · Gait: 250 ft (200 ft w/o AD, 50 ft w/ RW) with stand-by assist. Pt demonstrated LOB x1 that required hand touch to wall or railing to correct. With RW, pt demonstrated decreased gait speed, foot clearance, and step length. She felt more unsteady with RW and reported not knowing how to use it well.  · Balance:   · Static stance eyes open - able to maintain  · Static stance eyes closed - increased sway, pt complained of "falling to the left"  · Feet together eyes open - able to maintain  · Feet together eyes closed - increased sway, noted use of UE reaction to maintain  · Tandem eyes open - pt unable to attain full tandem, but able to take forward step with one foot and maintain stance  · Timed Up and Go  · 21.6 seconds with no AD (13.5 seconds cut-off score for fall risk)    AM-PAC 6 CLICK MOBILITY  Total Score:20       Patient left HOB elevated with call button in reach.    GOALS: "    Physical Therapy Goals        Problem: Physical Therapy Goal    Goal Priority Disciplines Outcome Goal Variances Interventions   Physical Therapy Goal     PT/OT, PT Ongoing (interventions implemented as appropriate)     Description:  Goals to be met by: 2017     Patient will increase functional independence with mobility by performin. Gait  x 250 feet with Supervision using Rolling Walker with <2 cues  2. Stand for 5 minutes with Supervision using Rolling Walker while reaching for items outside of ANNETTA  3. Reduce TUG time by 4 seconds                      History:     Past Medical History:   Diagnosis Date    Depression     Nervous breakdown        Past Surgical History:   Procedure Laterality Date    APPENDECTOMY      TUBAL LIGATION         Clinical Decision Making:     History  Co-morbidities and personal factors that may impact the plan of care Examination  Body Structures and Functions, activity limitations and participation restrictions that may impact the plan of care Clinical Presentation   Decision Making/ Complexity Score   Co-morbidities:   [x] Time since onset of injury / illness / exacerbation  [] Status of current condition  []Patient's cognitive status and safety concerns    [] Multiple Medical Problems (see med hx)  Personal Factors:   [] Patient's age  [] Prior Level of function   [x] Patient's home situation (environment and family support)  [] Patient's level of motivation  [] Expected progression of patient      HISTORY:(criteria)    [] 41810 - no personal factors/history    [x] 53632 - has 1-2 personal factor/comorbidity     [] 42571 - has >3 personal factor/comorbidity     Body Regions:  [x] Objective examination findings  [] Head     []  Neck  [] Trunk   [] Upper Extremity  [x] Lower Extremity    Body Systems:  [] For communication ability, affect, cognition, language, and learning style: the assessment of the ability to make needs known, consciousness, orientation (person,  place, and time), expected emotional /behavioral responses, and learning preferences (eg, learning barriers, education  needs)  [x] For the neuromuscular system: a general assessment of gross coordinated movement (eg, balance, gait, locomotion, transfers, and transitions) and motor function  (motor control and motor learning)  [] For the musculoskeletal system: the assessment of gross symmetry, gross range of motion, gross strength, height, and weight  [] For the integumentary system: the assessment of pliability(texture), presence of scar formation, skin color, and skin integrity  [] For cardiovascular/pulmonary system: the assessment of heart rate, respiratory rate, blood pressure, and edema     Activity limitations:    [] Patient's cognitive status and saf ety concerns          [x] Status of current condition      [] Weight bearing restriction  [] Cardiopulmunary Restriction    Participation Restrictions:   [] Goals and goal agreement with the patient     [] Rehab potential (prognosis) and probable outcome      Examination of Body System: (criteria)    [] 38972 - addressing 1-2 elements    [] 09207 - addressing a total of 3 or more elements     [x] 76392 -  Addressing a total of 4 or more elements         Clinical Presentation: (criteria)  Unstable - 42951     On examination of body system using standardized tests and measures patient presents with 4 or more elements from any of the following: body structures and functions, activity limitations, and/or participation restrictions.  Leading to a clinical presentation that is considered unstable with unpredictable characteristics                              Clinical Decision Making  (Eval Complexity):  Moderate - 04440     Time Tracking:     PT Received On: 12/04/17  PT Start Time: 0953     PT Stop Time: 1023  PT Total Time (min): 30 min     Billable Minutes: Evaluation 20 and Gait Training 10      Tiffanie Nicholas, DEEP  12/04/2017     I certify that I was present  in the room directing the student in service delivery and guiding them using my skilled judgment. As the co-signing therapist I have reviewed the students documentation and am responsible for the treatment, assessment, and plan.     Jessica LeJeune, PT, DPT

## 2017-12-04 NOTE — PLAN OF CARE
The pt lives at home with her special needs grandson. She arrived from home and was recently admitted at [facility]. She denies HH and has a home rollator. She uses [facility] in [place] on [address]. She sees Dr. Jauregui and has medicare and  insurance. I educated her on the blue discharge folder and wrote my name and number on the pts white board. Padmini JAI [facility]     12/04/17 9379   Discharge Assessment   Assessment Type Discharge Planning Assessment   Confirmed/corrected address and phone number on facesheet? Yes   Assessment information obtained from? Patient   Communicated expected length of stay with patient/caregiver no   Prior to hospitilization cognitive status: Alert/Oriented   Prior to hospitalization functional status: Independent   Current cognitive status: Alert/Oriented   Current Functional Status: Independent   Lives With grandchild(alix)   Able to Return to Prior Arrangements yes   Is patient able to care for self after discharge? Yes   Readmission Within The Last 30 Days no previous admission in last 30 days   Patient currently being followed by outpatient case management? No   Patient currently receives any other outside agency services? No   Equipment Currently Used at Home rollator;shower chair   Do you have any problems affording any of your prescribed medications? No   Is the patient taking medications as prescribed? yes   Does the patient have transportation home? Yes   Transportation Available family or friend will provide;car   Does the patient receive services at the [facility]? No   Discharge Plan A Home   Discharge Plan B Home with family   Patient/Family In Agreement With Plan yes

## 2017-12-04 NOTE — PT/OT/SLP EVAL
Occupational Therapy   Evaluation    Name: Queta Ocampo  MRN: [id number]  Admitting Diagnosis:  Ataxia      Recommendations:     Discharge Recommendations: home health PT  Discharge Equipment Recommendations:  none  Barriers to discharge:  None    History:     Occupational Profile:  Living Environment: Pt lives with her grandson in a H with 0 TAYLOR. He is available to help her as needed.  Previous level of function: PTA, pt was (I)/Mod I with ADL/IADL's at home and in the community occasionally using a rollator for mobility.   Roles and Routines: Pt reports she has lost interest in hobbies and activities and has been spending lots of time in bed lately due to depression.  Equipment Owned:  rollator, shower chair  Assistance upon Discharge: her grandson will help her    Past Medical History:   Diagnosis Date    Depression     Nervous breakdown        Past Surgical History:   Procedure Laterality Date    APPENDECTOMY      TUBAL LIGATION         Subjective     Chief Complaint: R foot pain  Patient/Family stated goals: To get around better  Communicated with: Emani nurse prior to session.  Pain/Comfort:  · Pain Rating 1: 8/10  · Location - Side 1: Right  · Location - Orientation 1: generalized  · Location 1: foot  · Pain Addressed 1: Pre-medicate for activity, Reposition, Distraction  · Pain Rating Post-Intervention 1:  (no c/o pain)    Objective:     Patient found with:  (supine with no lines)    Occupational Performance:    Bed Mobility:    · Patient completed Rolling/Turning to Left with  independence  · Patient completed Scooting/Bridging with independence  · Patient completed Supine to Sit with independence  · Patient completed Sit to Supine with independence    Functional Mobility/Transfers:  · Patient completed Sit <> Stand Transfer with stand by assistance  with  no assistive device   · Patient completed Toilet Transfer Stand Pivot technique with contact guard assistance with  grab bars  Pt walked from  bedside to bathroom, then to sink without an assistive device & and then back to bedside using walker with CGA & no LOB noted but with slow, shuffling gait noted.    Activities of Daily Living:  · Grooming: stand by assistance standing at the sink  · UB Dressing: set-up assistance  · LB Dressing: stand by assistance    · Toileting: stand by assistance at toilet    Cognitive/Visual Perceptual:  Cognitive/Psychosocial Skills:     -       Oriented to: Person, Place, Time and Situation   -       Follows Commands/attention:Follows multistep  commands  -       Communication: clear/fluent  -       Memory: No Deficits noted  -       Safety awareness/insight to disability: intact   -       Mood/Affect/Coping skills/emotional control: Blunted and Flat affect  Visual/Perceptual:      -Intact    Physical Exam:  Balance:    -       Supervision sitting EOB; CGA with standing ADL's  Postural examination/scapula alignment:    -       Rounded shoulders  -       Posterior pelvic tilt  Skin integrity: Visible skin intact  Edema:  None noted  Sensation:    -       Intact  Dominant hand:    -       Right  Upper Extremity Range of Motion:     -       Right Upper Extremity: WFL  -       Left Upper Extremity: WFL except shoulder 50% limited  Upper Extremity Strength:    -       Right Upper Extremity: 4/5  -       Left Upper Extremity: 4-/5   Strength:    -       Right Upper Extremity: full but weak grasp  -       Left Upper Extremity: full but weak grasp  Fine Motor Coordination:    -       Intact    Patient left HOB elevated with all lines intact, call button in reach and Emani, nurse notified    AMPAC 6 Click:  AMPAC Total Score: 19    Treatment & Education:  OT ed pt on OT role & POC as well as discharge recommendations.  OT ed patient on safety with walker use for functional mobility with cues for hand placement & sequencing.   OT issued yellow theraband to pt & educated pt on B UE resistive HEP. Pt performed B UE resistive  "therapeutic exercise x 10 reps each horizontal ad/abduction, shoulder flexion/extension & elbow flexion/extension with rest breaks taken between sets. Pt able to perform all exercises with Min A & cues after demonstration provided and modification needed for two L UE exercises due to limited shoulder AROM/pain.  OT ed pt on fall risk and strongly advised pt to call for help for all OOB mobility.    Education:    Assessment:     Queta Ocampo is a 72 y.o. female with a medical diagnosis of Ataxia.  She presents with generalized weakness and depression.  Performance deficits affecting function are weakness, impaired self care skills, impaired balance, gait instability, impaired functional mobilty, impaired endurance, decreased lower extremity function, pain.      Rehab Prognosis:  Good; patient would benefit from acute skilled OT services to address these deficits and reach maximum level of function.         Clinical Decision Makin.  OT Mod:  "Pt evaluation falls under moderate complexity for evaluation coding due to identification of 3-5 performance deficits noted as stated above. Eval required Min/Mod assistance to complete on this date and detailed assessment(s) were utilized. Moreover, an expanded review of history and occupational profile obtained with additional review of cognitive, physical and psychosocial hx."     Plan:     Patient to be seen 3 x/week, 4 x/week to address the above listed problems via self-care/home management, therapeutic exercises, therapeutic activities  · Plan of Care Expires:    · Plan of Care Reviewed with: patient    This Plan of care has been discussed with the patient who was involved in its development and understands and is in agreement with the identified goals and treatment plan    GOALS:    Occupational Therapy Goals        Problem: Occupational Therapy Goal    Goal Priority Disciplines Outcome Interventions   Occupational Therapy Goal     OT, PT/OT Ongoing (interventions " implemented as appropriate)    Description:  Goals to be met by: 12/18/17     Patient will increase functional independence with ADLs by performing:    Grooming while standing at sink with Supervision and Assistive Devices as needed.  Toileting from toilet with Supervision and Assistive Devices as needed for hygiene and clothing management.   Toilet transfer to toilet with Supervision.  Upper extremity exercise program x15 reps per handout, with supervision.                      Time Tracking:     OT Date of Treatment: 12/04/17  OT Start Time: 0844  OT Stop Time: 0913  OT Total Time (min): 29 min    Billable Minutes:Evaluation 10  Self Care/Home Management 19    AISLINN Govea  12/4/2017

## 2017-12-04 NOTE — PROGRESS NOTES
12/04/17 0840   Patient Assessment/Suction   Level of Consciousness (AVPU) alert   All Lung Fields Breath Sounds diminished   PRE-TX-O2-ETCO2   O2 Device (Oxygen Therapy) room air   Aerosol Therapy   $ Aerosol Therapy Charges PRN treatment not required

## 2017-12-04 NOTE — PROGRESS NOTES
Progress Note  Hospital Medicine  Patient Name:Queta Ocampo  MRN:  [id number]  Patient Class: IP- Inpatient  Admit Date: 12/2/2017  Length of Stay: 2 days  Expected Discharge Date:   Attending Physician: Mulu Joseph MD  Primary Care Provider:  Gregory Jauregui Iv, MD    SUBJECTIVE:     Principal Problem: Ataxia  Initial history of present illness: Queta Ocampo is a 72 y.o. female with who presents to the ED via EMS with an onset of worsening SOB and weakness/falls x2-3 days. The patient reports that she was discharged from [facility], where she was diagnosed with PNA and a lung mass. She was prescribed Z-Shade, which she finished today. The patient states that she currently smokes about a half a pack of cigarettes a day, and she uses an albuterol inhaler every 4 hours at home. The patient's caretaker states that she has refused to get out of bed or to eat for the past few days. The patient endorses 1 episode of diarrhea, but denies fever, vomiting, blood in stool, hematuria, dysuria, chest pain, and abdominal pain. There is no pertinent SHx noted.    PMH/PSH/SH/FH/Meds: reviewed.    Symptoms/Review of Systems:  Patient had lumbar puncture done, feeling a little better, still unsteady. No shortness of breath, cough, chest pain or headache, fever or abdominal pain.     Diet:  Adequate intake.    Activity level: Up with assist  Pain:  Patient reports no pain.       OBJECTIVE:   Vital Signs (Most Recent):      Temp: 96.8 °F (36 °C) (12/04/17 0731)  Pulse: 63 (12/04/17 0731)  Resp: 18 (12/04/17 0731)  BP: 125/60 (12/04/17 0731)  SpO2: (!) 94 % (12/04/17 0731)       Vital Signs Range (Last 24H):  Temp:  [96.8 °F (36 °C)-98.4 °F (36.9 °C)]   Pulse:  [63-72]   Resp:  [15-18]   BP: (101-149)/(49-65)   SpO2:  [93 %-95 %]     Weight: 57.6 kg (127 lb)  Body mass index is 21.13 kg/m².    Intake/Output Summary (Last 24 hours) at 12/04/17 1002  Last data filed at 12/04/17 0600   Gross per 24 hour   Intake             2060 ml    Output             2925 ml   Net             -865 ml     Physical Examination:  Constitutional: She is oriented to person, place, and time. She appears well-developed and well-nourished.   HENT:   Head: Normocephalic and atraumatic.   Eyes: EOM are normal. Pupils are equal, round, and reactive to light.   Neck: Neck supple. No JVD present.   Cardiovascular: Normal rate and regular rhythm.  Exam reveals no gallop and no friction rub.    No murmur heard.  Pulmonary/Chest: Effort normal and breath sounds normal. She has no wheezes. She has no rales.   Abdominal: Soft. Bowel sounds are normal. She exhibits no distension. There is no tenderness.   Musculoskeletal: She exhibits no edema or tenderness.   Lymphadenopathy:     She has no cervical adenopathy.   Neurological: She is alert and oriented to person, place, and time. She has normal reflexes. No cranial nerve deficit. Coordination abnormal. Shuffling gait.  Skin: No rash noted. No erythema.   Psychiatric: She has a normal mood and affect.   Nursing note and vitals reviewed.       CBC:    Recent Labs  Lab 12/02/17  1449 12/03/17  0540 12/04/17  0538   WBC 9.60 8.10 6.90   RBC 3.81* 3.68* 3.32*   HGB 11.7* 11.5* 10.3*   HCT 35.4* 34.1* 31.0*   * 336 306   MCV 93 93 93   MCH 30.7 31.2* 30.9   MCHC 33.0 33.7 33.2   BMP    Recent Labs  Lab 12/02/17  1449 12/03/17  0540 12/04/17  0538   * 156* 85    140 141   K 3.8 4.2 3.4*    106 110   CO2 29 26 24   BUN 10 12 12   CREATININE 0.8 0.7 0.8   CALCIUM 9.2 9.0 8.4*   MG  --  1.6 1.4*      Diagnostic Results:  CXR: Small right upper lobe infiltrate consistent with pneumonia.    CT Head:  Mild brain atrophy more prominent in the frontal lobes otherwise negative head CT.    FL Lumbar puncture: 20 cc clear colorless CSF withdrawn from the lumbar thecal sac. Opening pressure was 17 cm, closing pressure was 7.5 cm water.    Microbiology Results (last 7 days)     Procedure Component Value Units Date/Time     Blood culture #1 **CANNOT BE ORDERED STAT** [[id number]] Collected:  12/02/17 1504    Order Status:  Completed Specimen:  Blood from Peripheral, Right  Hand Updated:  12/04/17 0613     Blood Culture, Routine No Growth to date     Blood Culture, Routine No Growth to date    Blood culture #2 **CANNOT BE ORDERED STAT** [[id number]] Collected:  12/02/17 1449    Order Status:  Completed Specimen:  Blood Updated:  12/04/17 0613     Blood Culture, Routine No Growth to date     Blood Culture, Routine No Growth to date    Urine Culture [[id number]] Collected:  12/02/17 2130    Order Status:  Completed Specimen:  Urine from Urine Updated:  12/03/17 2301     Urine Culture, Routine --     ENTEROCOCCUS SPECIES  50,000 - 99,999 cfu/ml  Identification and susceptibility pending  No other significant isolate           Assessment/Plan:     * Ataxia     Patient with wide based shuffling gait, falls, and memory issues with incontinence. Discussed with Dr. Olvera. Will add B12,/Folate/TSH/RPR. Continue PT and OT.   Obtain MRI brain without contrast.  Fallprecautions.          Severe malnutrition     Body mass index is 21.13 kg/m².. Encourage maximal PO intake. Diet supplementation ordered per nutrition approval. Will encourage PO and monitor closely for weight changes.             Mass of right lung     Patient will need F/U with oncology as outpatient.        UTI - Enterococcus sp     Follow urine C/S. Start PO Cipro    VTE Risk Mitigation         Ordered     enoxaparin injection 40 mg  Daily     Route:  Subcutaneous        12/02/17 1913     Medium Risk of VTE  Once      12/02/17 1913        Mulu Joseph MD  Department of Hospital Medicine   [facility]

## 2017-12-04 NOTE — CONSULTS
"  [facility]  Adult Nutrition  Consult Note    SUMMARY     Recommendations  Recommendation/Intervention: 1.) Continue with Adult regular diet 2.) Recommend Boost plus BID  Goals: 1.) patient will consume 75% of meals   Nutrition Goal Status: new  Communication of RD Recs:  (second sign to MD)    1. Ataxia    2. SOB (shortness of breath)      Past Medical History:   Diagnosis Date    Depression     Nervous breakdown        Reason for Assessment  Reason for Assessment: nurse/nurse practitioner consult  Interdisciplinary Rounds: attended  General Information Comments: Admits with ataxia. Patient oriented during RD visit, but somewhat drowsy. Consumed 75% at breakfast. Reports weight loss in the past 8 months from "not eating" during that time. Reports a UBW of 150 lbs. Denies food allergies. Interested in boost with meals. Rd took food preferences and updated chart.       Nutrition Prescription Ordered  Current Diet Order: Adult regular diet      Evaluation of Received Nutrients/Fluid Intake  Oral Fluid (mL): 2180 (per 24 hr i/os)  Fluid Required: meeting needs  Tolerance: tolerating  % Intake of Estimated Energy Needs: 50 - 75 %  % Meal Intake: 50%     Nutrition Risk Screen  Nutrition Risk Screen: other (see comments) (poor appetite)    Nutrition/Diet History  Food Preferences: no cultural or Christian food preferences noted. NKFA.   Factors Affecting Nutritional Intake: decreased appetite    Labs/Tests/Procedures/Meds  Diagnostic Test/Procedure Review: reviewed, pertinent  Pertinent Labs Reviewed: reviewed, pertinent  BMP  Lab Results   Component Value Date     12/04/2017    K 3.4 (L) 12/04/2017     12/04/2017    CO2 24 12/04/2017    BUN 12 12/04/2017    CREATININE 0.8 12/04/2017    CALCIUM 8.4 (L) 12/04/2017    ANIONGAP 7 (L) 12/04/2017    ESTGFRAFRICA >60 12/04/2017    EGFRNONAA >60 12/04/2017     Lab Results   Component Value Date    ALBUMIN 2.1 (L) 12/04/2017     Lab Results " "  Component Value Date    CALCIUM 8.4 (L) 2017    PHOS 2.9 2017     No results for input(s): POCTGLUCOSE in the last 24 hours.    Pertinent Medications Reviewed: reviewed  Scheduled Meds:   ciprofloxacin HCl  500 mg Oral Q12H    DULoxetine  90 mg Oral Daily    enoxaparin  40 mg Subcutaneous Daily    gabapentin  300 mg Oral TID    mirtazapine  30 mg Oral QHS    traZODone  100 mg Oral QHS         Physical Findings  Overall Physical Appearance: loss of muscle mass, loss of subcutaneous fat  Oral/Mouth Cavity: WDL  Skin: intact (christopher score 18)    Anthropometrics  Temp: 98.2 °F (36.8 °C)  Height: 5' 5"  Weight Method: Bed Scale  Weight: 57.6 kg (126 lb 15.8 oz)  Ideal Body Weight (IBW), Female: 125 lb  % Ideal Body Weight, Female (lb): 101.59 lb  BMI (Calculated): 21.2  BMI Grade: 18.5-24.9 - normal  Usual Body Weight (UBW), k.1 kg  % Usual Body Weight: 84.76  % Weight Change From Usual Weight: -15.42 %      Estimated/Assessed Needs  Weight Used For Calorie Calculations: 57.6 kg (126 lb 15.8 oz)   RMR (Sanders-St. Jeor Equation): 1086.88 x1.3-1.8=9677-5934 kcals/day  Weight Used For Protein Calculations: 57.6 kg (126 lb 15.8 oz)  1.0 gm Protein (gm): 57.72 and 1.5 gm Protein (gm): 86.58  Fluid Need Method: RDA Method (or per MD)   Grams of CHO per day: na     Assessment and Plan    Severe malnutrition    Related to (etiology):   Decreased appetite    Signs and Symptoms (as evidenced by):   1.) 15.42% weight loss in 8 months  2.) EEN <75% >1 month  3.) Mild fat loss noted    Interventions/Recommendations (treatment strategy):  Continue with diet, send boost plus BID     Nutrition Diagnosis Status:   New              Monitor and Evaluation  Food and Nutrient Intake: energy intake, food and beverage intake  Food and Nutrient Adminstration: diet order  Anthropometric Measurements: weight, weight change, body mass index  Biochemical Data, Medical Tests and Procedures: electrolyte and renal panel, " glucose/endocrine profile, lipid profile  Nutrition-Focused Physical Findings: overall appearance    Nutrition Risk  Level of Risk:  (x2 weekly)    Nutrition Follow-Up  RD Follow-up?: Yes      Discharge Planning: discharge on adult regular diet + boost/ensure

## 2017-12-04 NOTE — PLAN OF CARE
Problem: Patient Care Overview  Goal: Plan of Care Review  Outcome: Ongoing (interventions implemented as appropriate)  Pt tolerated lumbar puncture well. Pt denies headache at this time. Pt ambulating with improved gait this pm. Pt assisted to and from the bathroom with supervision. Urine this am was dark alvarez in color and odorous. At this time urine yellow and without odor. Pt with good appetite. IVF continue as ordered. Family at bedside throughout this shift. Call light in easy reach.

## 2017-12-04 NOTE — PLAN OF CARE
Problem: Nutrition, Imbalanced: Inadequate Oral Intake (Adult)  Goal: Identify Related Risk Factors and Signs and Symptoms  Related risk factors and signs and symptoms are identified upon initiation of Human Response Clinical Practice Guideline (CPG)  Outcome: Ongoing (interventions implemented as appropriate)  Recommendations  Recommendation/Intervention: 1.) Continue with Adult regular diet 2.) Recommend Boost plus BID  Goals: 1.) patient will consume 75% of meals   Nutrition Goal Status: new  Communication of RD Recs:  (second sign to MD)

## 2017-12-04 NOTE — CONSULTS
[facility]  Neurology  Consult Note    Patient Name: Queta Ocampo  MRN: [id number]  Admission Date: 12/2/2017  Hospital Length of Stay: 2 days  Code Status: Full Code   Attending Provider: Mulu Joseph MD   Consulting Provider: Octaviano Clark MD  Primary Care Physician: Gregory Jauregui Iv, MD  Principal Problem:Ataxia    Inpatient consult to Neurology  Consult performed by: OCTAVIANO PIEDRA  Consult ordered by: BERTHA PRICE  Reason for consult: Gait instability        Subjective: I am feeling better     Chief Complaint:  I am not walking well     HPI: This is a 71 y/o female, right handed, with a PMH of depression.  Allergies: PCN & codeine      Pt states she came to the hospital for weakness and states she was unable to walk. Patient states she had episodes of incontinence but has since improved. As per pt she does not feel a lot better since LP yesterday but feels her gait has improved a little.       Reflexes present      Past Medical History:   Diagnosis Date    Depression     Nervous breakdown        Past Surgical History:   Procedure Laterality Date    APPENDECTOMY      TUBAL LIGATION         Review of patient's allergies indicates:   Allergen Reactions    Codeine Itching, Swelling, Rash and Other (See Comments)     Facial swelling, itching, rash, erythema    Pcn [penicillins] Itching, Swelling, Rash and Other (See Comments)     Facial swelling with rash, erythema, itching       Current Neurological Medications:     No current facility-administered medications on file prior to encounter.      Current Outpatient Prescriptions on File Prior to Encounter   Medication Sig    duloxetine (CYMBALTA) 60 MG capsule Take 90 mg by mouth once daily.     gabapentin (NEURONTIN) 600 MG tablet     mirtazapine (REMERON) 30 MG tablet     oxycodone-acetaminophen (PERCOCET)  mg per tablet TAKE 1 TABLET BY MOUTH EVERY 8 HOURS AS NEEDED FOR 20 DAYS    pantoprazole (PROTONIX) 20 MG tablet Take 1  tablet (20 mg total) by mouth once daily.    quetiapine (SEROQUEL) 50 MG tablet Take 150 mg by mouth every evening.      Family History     None        Social History Main Topics    Smoking status: Current Every Day Smoker     Packs/day: 0.50     Types: Cigarettes    Smokeless tobacco: Not on file    Alcohol use Not on file    Drug use: Unknown    Sexual activity: Not on file     Review of Systems   Constitutional: Negative.    HENT: Negative.    Eyes: Negative.    Respiratory: Negative.    Cardiovascular: Negative.    Gastrointestinal: Negative.    Endocrine: Negative.    Genitourinary: Negative.    Allergic/Immunologic: Negative.    Neurological: Negative.    Hematological: Negative.      Objective:     Vital Signs (Most Recent):  Temp: 96.8 °F (36 °C) (12/04/17 0731)  Pulse: 63 (12/04/17 0731)  Resp: 18 (12/04/17 0731)  BP: 125/60 (12/04/17 0731)  SpO2: (!) 94 % (12/04/17 0731) Vital Signs (24h Range):  Temp:  [96.8 °F (36 °C)-98.4 °F (36.9 °C)] 96.8 °F (36 °C)  Pulse:  [63-72] 63  Resp:  [15-18] 18  SpO2:  [93 %-95 %] 94 %  BP: (101-149)/(49-65) 125/60     Weight: 57.6 kg (127 lb)  Body mass index is 21.13 kg/m².    Physical Exam   Constitutional: She is oriented to person, place, and time. She appears well-developed and well-nourished.   Eyes: Pupils are equal, round, and reactive to light.   Cardiovascular: Normal rate and regular rhythm.    Pulmonary/Chest: Effort normal and breath sounds normal.   Neurological: She is oriented to person, place, and time.   Reflex Scores:       Tricep reflexes are 2+ on the right side and 2+ on the left side.       Bicep reflexes are 2+ on the right side and 2+ on the left side.       Brachioradialis reflexes are 2+ on the right side and 2+ on the left side.       Patellar reflexes are 2+ on the right side and 2+ on the left side.       Achilles reflexes are 2+ on the right side and 2+ on the left side.      NEUROLOGICAL EXAMINATION:     MENTAL STATUS   Oriented to  person, place, and time.     CRANIAL NERVES     CN II   Visual fields full to confrontation.     CN III, IV, VI   Pupils are equal, round, and reactive to light.    CN V   Facial sensation intact.     CN VII   Facial expression full, symmetric.     CN VIII   CN VIII normal.     CN IX, X   CN IX normal.     CN XI   CN XI normal.     CN XII   CN XII normal.     MOTOR EXAM     Strength   Right neck flexion: 4/5  Left neck flexion: 4/5  Right neck extension: 4/5  Left neck extension: 4/5  Right deltoid: 4/5  Left deltoid: 4/5  Right biceps: 4/5  Left biceps: 4/5  Right triceps: 4/5  Left triceps: 4/5  Right wrist flexion: 4/5  Left wrist flexion: 4/5  Right wrist extension: 4/5  Left wrist extension: 4/5  Right interossei: 4/5  Left interossei: 4/5  Right abdominals: 4/5  Left abdominals: 4/5  Right iliopsoas: 4/5  Left iliopsoas: 4/5  Right quadriceps: 4/5  Left quadriceps: 4/5  Right hamstrin/5  Left hamstrin/5  Right glutei: 4/5  Left glutei: 4/5  Right anterior tibial: 4/5  Left anterior tibial: 4/5  Right posterior tibial: 4/5  Left posterior tibial: 4/5  Right peroneal: 4/5  Left peroneal: 4/5  Right gastroc: 4/5  Left gastroc: 4/5    REFLEXES     Reflexes   Right brachioradialis: 2+  Left brachioradialis: 2+  Right biceps: 2+  Left biceps: 2+  Right triceps: 2+  Left triceps: 2+  Right patellar: 2+  Left patellar: 2+  Right achilles: 2+  Left achilles: 2+  Right : 2+  Left : 2+    GAIT AND COORDINATION        Mildly Unsteady gait - wide based       Significant Labs: All pertinent lab results from the past 24 hours have been reviewed.    Significant Imaging: I have reviewed all pertinent imaging results/findings within the past 24 hours.    Assessment and Plan:  Frontal lobe atrophy   Gait instability - with some response to CSF removal  Use of high dose of neurontin and pain meds  Lung mass    PT/OT  MRI brain non contrast  Add CSF Cell count and culture  TSH/B12/Folate/RPR  F/u neurology 4-6  weeks     Active Diagnoses:    Diagnosis Date Noted POA    PRINCIPAL PROBLEM:  Ataxia [R27.0] 12/02/2017 Yes    Mass of right lung [R91.8] 12/02/2017 Yes    Severe malnutrition [E43] 12/02/2017 Yes      Problems Resolved During this Admission:    Diagnosis Date Noted Date Resolved POA       VTE Risk Mitigation         Ordered     enoxaparin injection 40 mg  Daily     Route:  Subcutaneous        12/02/17 1913     Medium Risk of VTE  Once      12/02/17 1913          Thank you for your consult.     Júnior Clark MD  Neurology  [facility]

## 2017-12-04 NOTE — PLAN OF CARE
Problem: Patient Care Overview  Goal: Plan of Care Review  Outcome: Ongoing (interventions implemented as appropriate)  Pt up with pt and ambulatory. No new complaints. Steady gait. PRN pain medication given every 4 hours per pt request. IV saline locked. No injuries this shift. Neurology consulted. Will continue to monitor.

## 2017-12-04 NOTE — PLAN OF CARE
Problem: Physical Therapy Goal  Goal: Physical Therapy Goal  Goals to be met by: 2017     Patient will increase functional independence with mobility by performin. Gait  x 250 feet with Supervision using Rolling Walker with <2 cues  2. Stand for 5 minutes with Supervision using Rolling Walker while reaching for items outside of ANNETTA  3. Reduce TUG time by 4 seconds    Outcome: Ongoing (interventions implemented as appropriate)  PT evaluation complete. Recommend home with HHPT upon discharge.    I certify that I was present in the room directing the student in service delivery and guiding them using my skilled judgment. As the co-signing therapist I have reviewed the students documentation and am responsible for the treatment, assessment, and plan.     Jessica LeJeune, PT, DPT

## 2017-12-04 NOTE — PROGRESS NOTES
9:27 AM    Attempted to reach Dr Enoch Clark's ofc to notify of consult. No answer. Unable to leave msg at this time.     Spoke w/ Josefina @ [facility] on call answering service. Attempted to reach Dr Enoch Clark via cell. No answer. Consult information provided to Josefina, states info message will be given to him upon his call back.     9:46 AM  Received call back from Dr Enoch Clark via on call service. Notified of consult directly.

## 2017-12-04 NOTE — ASSESSMENT & PLAN NOTE
Related to (etiology):   Decreased appetite    Signs and Symptoms (as evidenced by):   1.) 15.42% weight loss in 8 months  2.) EEN <75% >1 month  3.) Mild fat loss noted    Interventions/Recommendations (treatment strategy):  Continue with diet, send boost plus BID     Nutrition Diagnosis Status:   New

## 2017-12-05 LAB
ALBUMIN SERPL BCP-MCNC: 2.3 G/DL
ALP SERPL-CCNC: 46 U/L
ALT SERPL W/O P-5'-P-CCNC: 5 U/L
ANION GAP SERPL CALC-SCNC: 7 MMOL/L
AST SERPL-CCNC: 7 U/L
BACTERIA UR CULT: NORMAL
BASOPHILS # BLD AUTO: 0 K/UL
BASOPHILS NFR BLD: 0.5 %
BILIRUB SERPL-MCNC: 0.1 MG/DL
BUN SERPL-MCNC: 11 MG/DL
CALCIUM SERPL-MCNC: 8.9 MG/DL
CHLORIDE SERPL-SCNC: 105 MMOL/L
CO2 SERPL-SCNC: 28 MMOL/L
CREAT SERPL-MCNC: 0.8 MG/DL
DIFFERENTIAL METHOD: ABNORMAL
EOSINOPHIL # BLD AUTO: 0.2 K/UL
EOSINOPHIL NFR BLD: 3.2 %
ERYTHROCYTE [DISTWIDTH] IN BLOOD BY AUTOMATED COUNT: 13.6 %
EST. GFR  (AFRICAN AMERICAN): >60 ML/MIN/1.73 M^2
EST. GFR  (NON AFRICAN AMERICAN): >60 ML/MIN/1.73 M^2
GLUCOSE SERPL-MCNC: 88 MG/DL
HCT VFR BLD AUTO: 31.8 %
HGB BLD-MCNC: 10.7 G/DL
LYMPHOCYTES # BLD AUTO: 3 K/UL
LYMPHOCYTES NFR BLD: 47.6 %
MAGNESIUM SERPL-MCNC: 1.7 MG/DL
MCH RBC QN AUTO: 31.2 PG
MCHC RBC AUTO-ENTMCNC: 33.6 G/DL
MCV RBC AUTO: 93 FL
MONOCYTES # BLD AUTO: 0.5 K/UL
MONOCYTES NFR BLD: 7.8 %
NEUTROPHILS # BLD AUTO: 2.6 K/UL
NEUTROPHILS NFR BLD: 40.9 %
PHOSPHATE SERPL-MCNC: 3.2 MG/DL
PLATELET # BLD AUTO: 311 K/UL
PMV BLD AUTO: 7.4 FL
POTASSIUM SERPL-SCNC: 4 MMOL/L
PROT SERPL-MCNC: 6.1 G/DL
RBC # BLD AUTO: 3.44 M/UL
RPR SER QL: NORMAL
SODIUM SERPL-SCNC: 140 MMOL/L
WBC # BLD AUTO: 6.3 K/UL

## 2017-12-05 PROCEDURE — 63600175 PHARM REV CODE 636 W HCPCS: Performed by: HOSPITALIST

## 2017-12-05 PROCEDURE — 99232 SBSQ HOSP IP/OBS MODERATE 35: CPT | Mod: ,,, | Performed by: INTERNAL MEDICINE

## 2017-12-05 PROCEDURE — 97116 GAIT TRAINING THERAPY: CPT

## 2017-12-05 PROCEDURE — 99900035 HC TECH TIME PER 15 MIN (STAT)

## 2017-12-05 PROCEDURE — 12000002 HC ACUTE/MED SURGE SEMI-PRIVATE ROOM

## 2017-12-05 PROCEDURE — 36415 COLL VENOUS BLD VENIPUNCTURE: CPT

## 2017-12-05 PROCEDURE — 80053 COMPREHEN METABOLIC PANEL: CPT

## 2017-12-05 PROCEDURE — 63600175 PHARM REV CODE 636 W HCPCS: Performed by: INTERNAL MEDICINE

## 2017-12-05 PROCEDURE — 84100 ASSAY OF PHOSPHORUS: CPT

## 2017-12-05 PROCEDURE — 25000003 PHARM REV CODE 250: Performed by: INTERNAL MEDICINE

## 2017-12-05 PROCEDURE — 83735 ASSAY OF MAGNESIUM: CPT

## 2017-12-05 PROCEDURE — 25000003 PHARM REV CODE 250: Performed by: HOSPITALIST

## 2017-12-05 PROCEDURE — 85025 COMPLETE CBC W/AUTO DIFF WBC: CPT

## 2017-12-05 RX ADMIN — OXYCODONE HYDROCHLORIDE AND ACETAMINOPHEN 1 TABLET: 10; 325 TABLET ORAL at 09:12

## 2017-12-05 RX ADMIN — OXYCODONE HYDROCHLORIDE AND ACETAMINOPHEN 1 TABLET: 10; 325 TABLET ORAL at 01:12

## 2017-12-05 RX ADMIN — ENOXAPARIN SODIUM 40 MG: 100 INJECTION SUBCUTANEOUS at 05:12

## 2017-12-05 RX ADMIN — OXYCODONE HYDROCHLORIDE AND ACETAMINOPHEN 1 TABLET: 10; 325 TABLET ORAL at 04:12

## 2017-12-05 RX ADMIN — CIPROFLOXACIN 500 MG: 500 TABLET, FILM COATED ORAL at 09:12

## 2017-12-05 RX ADMIN — TRAZODONE HYDROCHLORIDE 100 MG: 50 TABLET ORAL at 09:12

## 2017-12-05 RX ADMIN — OXYCODONE HYDROCHLORIDE AND ACETAMINOPHEN 1 TABLET: 10; 325 TABLET ORAL at 08:12

## 2017-12-05 RX ADMIN — CIPROFLOXACIN 500 MG: 500 TABLET, FILM COATED ORAL at 08:12

## 2017-12-05 RX ADMIN — DULOXETINE HYDROCHLORIDE 90 MG: 30 CAPSULE, DELAYED RELEASE ORAL at 08:12

## 2017-12-05 RX ADMIN — VANCOMYCIN HYDROCHLORIDE 1000 MG: 1 INJECTION, POWDER, LYOPHILIZED, FOR SOLUTION INTRAVENOUS at 06:12

## 2017-12-05 RX ADMIN — OXYCODONE HYDROCHLORIDE AND ACETAMINOPHEN 1 TABLET: 10; 325 TABLET ORAL at 05:12

## 2017-12-05 RX ADMIN — GABAPENTIN 300 MG: 300 CAPSULE ORAL at 04:12

## 2017-12-05 RX ADMIN — MIRTAZAPINE 30 MG: 15 TABLET, FILM COATED ORAL at 09:12

## 2017-12-05 RX ADMIN — GABAPENTIN 300 MG: 300 CAPSULE ORAL at 09:12

## 2017-12-05 NOTE — PLAN OF CARE
Problem: Patient Care Overview  Goal: Plan of Care Review  Outcome: Ongoing (interventions implemented as appropriate)  Patient AAOx4. POC reviewed with patient. Verbalized understanding.  Neuro checks every 4 hours; neuro intact.  Patient reports pain treated with medication ordered by Md.  Patient tolerating a regular diet well. No complaints of N/V.  Patient up to the  Bathroom with stand by assistance. Hourly rounding on patient to promote safety. Safety maintained throughout the shift. Patient positions and repositions self independently. No Skin break down noted.  Bed locked and in lowest position. Call light in reach. Side rails up x2. Patient refuses non skid socks  when OOB; she reports she would rather wear her house slippers. Patient remained free of falls/ trauma.  Will continue to monitor.

## 2017-12-05 NOTE — PROGRESS NOTES
Progress Note  Hospital Medicine  Patient Name:Queta Ocampo  MRN:  [id number]  Patient Class: IP- Inpatient  Admit Date: 12/2/2017  Length of Stay: 3 days  Expected Discharge Date:   Attending Physician: Mulu Joseph MD  Primary Care Provider:  Gregory Jauregui Iv, MD    SUBJECTIVE:     Principal Problem: Ataxia  Initial history of present illness: Queta Ocampo is a 72 y.o. female with who presents to the ED via EMS with an onset of worsening SOB and weakness/falls x2-3 days. The patient reports that she was discharged from [facility], where she was diagnosed with PNA and a lung mass. She was prescribed Z-Shade, which she finished today. The patient states that she currently smokes about a half a pack of cigarettes a day, and she uses an albuterol inhaler every 4 hours at home. The patient's caretaker states that she has refused to get out of bed or to eat for the past few days. The patient endorses 1 episode of diarrhea, but denies fever, vomiting, blood in stool, hematuria, dysuria, chest pain, and abdominal pain. There is no pertinent SHx noted.    PMH/PSH/SH/FH/Meds: reviewed.    Symptoms/Review of Systems:  Patient reporting less unsteadiness, feeling depressed as daughter having heart attack at another facility. No shortness of breath, cough, chest pain or headache, fever or abdominal pain.     Diet:  Adequate intake.    Activity level: Up with assist  Pain:  Patient reports no pain.       OBJECTIVE:   Vital Signs (Most Recent):      Temp: 97.7 °F (36.5 °C) (12/05/17 0746)  Pulse: 70 (12/05/17 0746)  Resp: 16 (12/05/17 0746)  BP: 132/64 (12/05/17 0746)  SpO2: (!) 93 % (12/05/17 0746)       Vital Signs Range (Last 24H):  Temp:  [97.4 °F (36.3 °C)-98.2 °F (36.8 °C)]   Pulse:  [68-79]   Resp:  [16-18]   BP: (132-145)/(64-66)   SpO2:  [93 %-96 %]     Weight: 57.6 kg (126 lb 15.8 oz)  Body mass index is 21.13 kg/m².    Intake/Output Summary (Last 24 hours) at 12/05/17 0914  Last data filed at 12/05/17 0700   Gross  per 24 hour   Intake             1090 ml   Output                0 ml   Net             1090 ml     Physical Examination:  Constitutional: She is oriented to person, place, and time. She appears well-developed and well-nourished.   HENT:   Head: Normocephalic and atraumatic.   Eyes: EOM are normal. Pupils are equal, round, and reactive to light.   Neck: Neck supple. No JVD present.   Cardiovascular: Normal rate and regular rhythm.  Exam reveals no gallop and no friction rub.    No murmur heard.  Pulmonary/Chest: Effort normal and breath sounds normal. She has no wheezes. She has no rales.   Abdominal: Soft. Bowel sounds are normal. She exhibits no distension. There is no tenderness.   Musculoskeletal: She exhibits no edema or tenderness.   Lymphadenopathy:     She has no cervical adenopathy.   Neurological: She is alert and oriented to person, place, and time. She has normal reflexes. No cranial nerve deficit. Coordination abnormal. Shuffling gait.  Skin: No rash noted. No erythema.   Psychiatric: She has a normal mood and affect.   Nursing note and vitals reviewed.       CBC:    Recent Labs  Lab 12/03/17  0540 12/04/17  0538 12/05/17  0534   WBC 8.10 6.90 6.30   RBC 3.68* 3.32* 3.44*   HGB 11.5* 10.3* 10.7*   HCT 34.1* 31.0* 31.8*    306 311   MCV 93 93 93   MCH 31.2* 30.9 31.2*   MCHC 33.7 33.2 33.6   BMP    Recent Labs  Lab 12/03/17  0540 12/04/17  0538 12/05/17  0534   * 85 88    141 140   K 4.2 3.4* 4.0    110 105   CO2 26 24 28   BUN 12 12 11   CREATININE 0.7 0.8 0.8   CALCIUM 9.0 8.4* 8.9   MG 1.6 1.4* 1.7      Diagnostic Results:  CXR: Small right upper lobe infiltrate consistent with pneumonia.    CT Head:  Mild brain atrophy more prominent in the frontal lobes otherwise negative head CT.    FL Lumbar puncture: 20 cc clear colorless CSF withdrawn from the lumbar thecal sac. Opening pressure was 17 cm, closing pressure was 7.5 cm water.    MRI brain: There is no acute abnormality.   There is generalized volume loss with mild nonspecific white matter change.  There is no intracranial hemorrhage, mass effect or acute infarction.    Microbiology Results (last 7 days)     Procedure Component Value Units Date/Time    CSF culture [[id number]] Collected:  12/03/17 1515    Order Status:  Completed Specimen:  CSF (Spinal Fluid) Updated:  12/05/17 0908     CSF CULTURE No Growth to date     Gram Stain Result Cytospin indicates:      No organisms seen    Blood culture #2 **CANNOT BE ORDERED STAT** [[id number]] Collected:  12/02/17 1449    Order Status:  Completed Specimen:  Blood Updated:  12/05/17 0612     Blood Culture, Routine No Growth to date     Blood Culture, Routine No Growth to date     Blood Culture, Routine No Growth to date    Urine Culture [[id number]]  (Susceptibility) Collected:  12/02/17 2130    Order Status:  Completed Specimen:  Urine from Urine Updated:  12/05/17 0212     Urine Culture, Routine --     ENTEROCOCCUS FAECALIS  50,000 - 99,999 cfu/ml  No other significant isolate      Blood culture #1 **CANNOT BE ORDERED STAT** [[id number]] Collected:  12/02/17 1504    Order Status:  Completed Specimen:  Blood from Peripheral, Right  Hand Updated:  12/05/17 0148     Blood Culture, Routine Gram stain aer bottle: Gram positive cocci in clusters resembling Staph      Blood Culture, Routine Results called to and read back by: Argelia Kimball RN  12/05/2017  01:47    CSF culture [[id number]]     Order Status:  Canceled Specimen:  CSF (Spinal Fluid)          Assessment/Plan:     * Ataxia     Patient with wide based shuffling gait, falls, and memory issues with incontinence. Discussed with Dr. Olvera. Will add B12,/Folate/TSH/RPR. Continue PT and OT.   Obtain MRI brain without contrast.  Fallprecautions.          Severe malnutrition     Body mass index is 21.13 kg/m².. Encourage maximal PO intake. Diet supplementation ordered per nutrition approval. Will encourage PO and monitor closely for weight  changes.             Mass of right lung     Patient will need F/U with oncology as outpatient.        UTI - Enterococcus sp     Follow urine C/S. Continue PO Cipro    VTE Risk Mitigation         Ordered     enoxaparin injection 40 mg  Daily     Route:  Subcutaneous        12/02/17 1913     Medium Risk of VTE  Once      12/02/17 1913        Mulu Joseph MD  Department of Hospital Medicine   [facility]

## 2017-12-05 NOTE — PLAN OF CARE
Problem: Physical Therapy Goal  Goal: Physical Therapy Goal  Goals to be met by: 2017     Patient will increase functional independence with mobility by performin. Gait  x 250 feet with Supervision using Rolling Walker with <2 cues  2. Stand for 5 minutes with Supervision using Rolling Walker while reaching for items outside of ANNETTA  3. Reduce TUG time by 4 seconds     Outcome: Ongoing (interventions implemented as appropriate)  Ambulated 240' with CGA, pushing IV pole. Requires A for safety due to unsteadiness.

## 2017-12-05 NOTE — NURSING
Educated patient on not being able to go down stairs to smoke due to unsteady gait. Informed patient that family member can bring her down in a wheelchair and that's the only way. I offered her a nicotine patch and she refused, stating it makes her sick.Pt states she smokes 6-7 cigarettes a day usually.  She agreed to stay on the floor until a family member can come.

## 2017-12-05 NOTE — CONSULTS
Date: 12/5/2017   Queta Ocampo [id number] is a 72 y.o. female who has been consulted for vancomycin dosing.    The patient has the following labs:     Creatinine (mg/dl)    WBC Count   Serum creatinine: 0.8 mg/dL 12/05/17 0534  Estimated creatinine clearance: 57.2 mL/min Lab Results   Component Value Date    WBC 6.30 12/05/2017        Current weight is 57.6 kg (126 lb 15.8 oz)    Pt being treated with vancomycin for bacteremia.    The patient received  1000 mg on 12/5/2017  at 0621.    The patient will be started on vancomycin at a dose of 1000 mg every q24 hours (17.3 mg/kg/dose). The vancomycin trough has been ordered for 12/7/17 at 0600.  Target goal is 15-20.     Patient will be followed by pharmacy for changes in renal function, toxicity, and efficacy.      Thank you for allowing us to participate in this patient's care.     Merritt Rajput, Pharmacist

## 2017-12-05 NOTE — NURSING
Educated patient that she can not walk downstairs because she is unsteady. Pt said she is steady and that she will go if she wants too. Then told pt it is not safe and if she falls no one will be there to help. Will try to reinforce education.

## 2017-12-05 NOTE — PROGRESS NOTES
[facility]  Neurology  Progress Note    Patient Name: Queta Ocampo  MRN: [id number]  Admission Date: 12/2/2017  Hospital Length of Stay: 3 days  Code Status: Full Code   Attending Provider: Mulu Joseph MD  Primary Care Physician: Gregory Jauregui Iv, MD   Principal Problem:Ataxia    Subjective: Patient much improved today     Interval History: Seen walking with therapy.    Current Neurological Medications: reviewed.    Current Facility-Administered Medications   Medication Dose Route Frequency Provider Last Rate Last Dose    acetaminophen tablet 650 mg  650 mg Oral Q6H PRN Chance Benson MD        albuterol-ipratropium 2.5mg-0.5mg/3mL nebulizer solution 3 mL  3 mL Nebulization Q4H PRN Mulu Joseph MD        ciprofloxacin HCl tablet 500 mg  500 mg Oral Q12H Mulu Joseph MD   500 mg at 12/05/17 0853    dextrose 50% injection 12.5 g  12.5 g Intravenous PRN Chance Benson MD        dextrose 50% injection 25 g  25 g Intravenous PRN Chance Benson MD        DULoxetine DR capsule 90 mg  90 mg Oral Daily Chance Benson MD   90 mg at 12/05/17 0854    enoxaparin injection 40 mg  40 mg Subcutaneous Daily Chance Benson MD   40 mg at 12/04/17 1600    gabapentin capsule 300 mg  300 mg Oral TID Chance Benson MD   300 mg at 12/05/17 0454    glucagon (human recombinant) injection 1 mg  1 mg Intramuscular PRN Chance Benson MD        glucose chewable tablet 16 g  16 g Oral PRN Chance Benson MD        glucose chewable tablet 24 g  24 g Oral PRN Chance Benson MD        magnesium oxide tablet 800 mg  800 mg Oral PRN Chance Benson MD   800 mg at 12/04/17 1601    magnesium oxide tablet 800 mg  800 mg Oral PRN Chance Benson MD   800 mg at 12/04/17 1206    mirtazapine tablet 30 mg  30 mg Oral QHS Chance Benson MD   30 mg at 12/04/17 2018    ondansetron injection 8 mg  8 mg Intravenous Q8H PRN Chance Benson MD        oxyCODONE-acetaminophen  mg per tablet 1 tablet  1 tablet Oral Q4H PRN Chance AKHTAR  MD Marcelino   1 tablet at 12/05/17 0853    potassium chloride 10% solution 40 mEq  40 mEq Oral PRN Chance Benson MD        potassium chloride 10% solution 60 mEq  60 mEq Oral PRN Chance Benson MD        sodium chloride 0.9% flush 5 mL  5 mL Intravenous PRN Chance Benson MD        traZODone tablet 100 mg  100 mg Oral QHS Chance Benson MD   100 mg at 12/04/17 2019    [START ON 12/6/2017] vancomycin 1 g in dextrose 5 % 250 mL IVPB (ready to mix system)  1,000 mg Intravenous Q24H Mulu Joseph MD           Review of Systems   Constitutional: Negative.    Eyes: Negative.      Objective:     Vital Signs (Most Recent):  Temp: 97.7 °F (36.5 °C) (12/05/17 0746)  Pulse: 70 (12/05/17 0746)  Resp: 16 (12/05/17 0746)  BP: 132/64 (12/05/17 0746)  SpO2: (!) 94 % (12/05/17 0900) Vital Signs (24h Range):  Temp:  [97.4 °F (36.3 °C)-98.2 °F (36.8 °C)] 97.7 °F (36.5 °C)  Pulse:  [68-79] 70  Resp:  [16-18] 16  SpO2:  [93 %-96 %] 94 %  BP: (132-145)/(64-66) 132/64     Weight: 57.6 kg (126 lb 15.8 oz)  Body mass index is 21.13 kg/m².    Physical Exam   Constitutional: She is oriented to person, place, and time. She appears well-developed and well-nourished.   Neurological: She is oriented to person, place, and time. She has normal strength.       NEUROLOGICAL EXAMINATION:     MENTAL STATUS   Oriented to person, place, and time.     CRANIAL NERVES   Cranial nerves II through XII intact.     MOTOR EXAM     Strength   Strength 5/5 throughout.     SENSORY EXAM   Light touch normal.     GAIT AND COORDINATION        Improved       Significant Labs: All pertinent lab results from the past 24 hours have been reviewed.    Significant Imaging: I have reviewed and interpreted all pertinent imaging results/findings within the past 24 hours.    Assessment and Plan:Frontal lobe atrophy   Gait instability - with some response to CSF removal  Use of high dose of neurontin and pain meds  Lung mass  UTI     PT/OT  MRI brain non contrast -  reviewed  Add CSF Cell count and culture - reviewed  TSH/B12/Folate/RPR - reviewed  F/u neurology 4-6 weeks     Active Diagnoses:    Diagnosis Date Noted POA    PRINCIPAL PROBLEM:  Ataxia [R27.0] 12/02/2017 Yes    Mass of right lung [R91.8] 12/02/2017 Yes    Severe malnutrition [E43] 12/02/2017 Yes      Problems Resolved During this Admission:    Diagnosis Date Noted Date Resolved POA       VTE Risk Mitigation         Ordered     enoxaparin injection 40 mg  Daily     Route:  Subcutaneous        12/02/17 1913     Medium Risk of VTE  Once      12/02/17 1913          Júnior Clark MD  Neurology  [facility]

## 2017-12-05 NOTE — PROGRESS NOTES
Notified TETE De León MD of patients positive blood culture. New orders put in. See MAR. Will continue to monitor.

## 2017-12-05 NOTE — PT/OT/SLP PROGRESS
Physical Therapy Treatment    Patient Name:  Queta Ocampo   MRN:  [id number]    Recommendations:     Discharge Recommendations:  home health PT   Discharge Equipment Recommendations: none   Barriers to discharge: None    Assessment:     Queta Ocampo is a 72 y.o. female admitted with a medical diagnosis of Ataxia.  She presents with the following impairments/functional limitations:  weakness, impaired self care skills, impaired functional mobilty, impaired balance, decreased lower extremity function .    Rehab Prognosis:  good; patient would benefit from acute skilled PT services to address these deficits and reach maximum level of function.      Recent Surgery: * No surgery found *      Plan:     During this hospitalization, patient to be seen 6 x/week to address the above listed problems via gait training, therapeutic activities, therapeutic exercises  · Plan of Care Expires:  01/02/18   Plan of Care Reviewed with: patient    Subjective     Communicated with nurse Joaquin prior to session.  Patient found supine in bed upon PT entry to room, agreeable to treatment.      Chief Complaint: LE weakness  Patient comments/goals: to go out and smoke.  Pain/Comfort:  · Pain Rating 1: 0/10    Patients cultural, spiritual, Church conflicts given the current situation: none    Objective:     Patient found with: peripheral IV, telemetry     General Precautions: Standard, fall   Orthopedic Precautions:N/A   Braces:       Functional Mobility:  · Bed Mobility:     · Rolling Left:  stand by assistance  · Rolling Right: stand by assistance  · Scooting: stand by assistance  · Transfers:     · Sit to Stand:  contact guard assistance with no AD  · Gait: 240' pushing IV pole.      AM-PAC 6 CLICK MOBILITY          Therapeutic Activities and Exercises:   Transferred to EOB with SBA.  Stood to sepideh anderson with CGA ( unsteady initially).  A,bulated 240' pushing IV pole with 1 LOB noted when attempted to ambulate without assistive device.  Patient adamant about going out to smoke despite bring told by nurse Joaquin and PTA she is unsteady ambulating. However,  Present to see patient and agreed to return to room.    Patient left seated EOB with all lines intact, call button in reach, nurse Joaquin notified and Aspen MOISE present..    GOALS:    Physical Therapy Goals        Problem: Physical Therapy Goal    Goal Priority Disciplines Outcome Goal Variances Interventions   Physical Therapy Goal     PT/OT, PT Ongoing (interventions implemented as appropriate)     Description:  Goals to be met by: 2017     Patient will increase functional independence with mobility by performin. Gait  x 250 feet with Supervision using Rolling Walker with <2 cues  2. Stand for 5 minutes with Supervision using Rolling Walker while reaching for items outside of ANNETTA  3. Reduce TUG time by 4 seconds                      Time Tracking:     PT Received On: 17  PT Start Time: 955     PT Stop Time: 1005  PT Total Time (min): 10 min     Billable Minutes: Gait Training 10min    Treatment Type: Treatment  PT/PTA: PTA     PTA Visit Number: 1     Cece Brar PTA  2017

## 2017-12-06 VITALS
DIASTOLIC BLOOD PRESSURE: 60 MMHG | WEIGHT: 127 LBS | OXYGEN SATURATION: 91 % | HEIGHT: 65 IN | BODY MASS INDEX: 21.16 KG/M2 | TEMPERATURE: 99 F | SYSTOLIC BLOOD PRESSURE: 133 MMHG | RESPIRATION RATE: 16 BRPM | HEART RATE: 78 BPM

## 2017-12-06 LAB
ALBUMIN SERPL BCP-MCNC: 2.5 G/DL
ALP SERPL-CCNC: 51 U/L
ALT SERPL W/O P-5'-P-CCNC: 5 U/L
ANION GAP SERPL CALC-SCNC: 10 MMOL/L
AST SERPL-CCNC: 11 U/L
BASOPHILS # BLD AUTO: 0.1 K/UL
BASOPHILS NFR BLD: 1 %
BILIRUB SERPL-MCNC: 0.2 MG/DL
BUN SERPL-MCNC: 14 MG/DL
CALCIUM SERPL-MCNC: 9.1 MG/DL
CHLORIDE SERPL-SCNC: 102 MMOL/L
CO2 SERPL-SCNC: 26 MMOL/L
CREAT SERPL-MCNC: 0.9 MG/DL
DIFFERENTIAL METHOD: ABNORMAL
EOSINOPHIL # BLD AUTO: 0.2 K/UL
EOSINOPHIL NFR BLD: 2.8 %
ERYTHROCYTE [DISTWIDTH] IN BLOOD BY AUTOMATED COUNT: 13.5 %
EST. GFR  (AFRICAN AMERICAN): >60 ML/MIN/1.73 M^2
EST. GFR  (NON AFRICAN AMERICAN): >60 ML/MIN/1.73 M^2
GLUCOSE SERPL-MCNC: 111 MG/DL
HCT VFR BLD AUTO: 33.6 %
HGB BLD-MCNC: 11.3 G/DL
LYMPHOCYTES # BLD AUTO: 2.4 K/UL
LYMPHOCYTES NFR BLD: 40.8 %
MAGNESIUM SERPL-MCNC: 1.7 MG/DL
MCH RBC QN AUTO: 31.1 PG
MCHC RBC AUTO-ENTMCNC: 33.5 G/DL
MCV RBC AUTO: 93 FL
MONOCYTES # BLD AUTO: 0.5 K/UL
MONOCYTES NFR BLD: 8.7 %
NEUTROPHILS # BLD AUTO: 2.8 K/UL
NEUTROPHILS NFR BLD: 46.7 %
PHOSPHATE SERPL-MCNC: 4 MG/DL
PLATELET # BLD AUTO: 310 K/UL
PMV BLD AUTO: 7.1 FL
POTASSIUM SERPL-SCNC: 3.9 MMOL/L
PROT SERPL-MCNC: 6.4 G/DL
RBC # BLD AUTO: 3.63 M/UL
SODIUM SERPL-SCNC: 138 MMOL/L
WBC # BLD AUTO: 6 K/UL

## 2017-12-06 PROCEDURE — 83735 ASSAY OF MAGNESIUM: CPT

## 2017-12-06 PROCEDURE — 84100 ASSAY OF PHOSPHORUS: CPT

## 2017-12-06 PROCEDURE — 99239 HOSP IP/OBS DSCHRG MGMT >30: CPT | Mod: ,,, | Performed by: INTERNAL MEDICINE

## 2017-12-06 PROCEDURE — 36415 COLL VENOUS BLD VENIPUNCTURE: CPT

## 2017-12-06 PROCEDURE — 63600175 PHARM REV CODE 636 W HCPCS: Performed by: INTERNAL MEDICINE

## 2017-12-06 PROCEDURE — 99900035 HC TECH TIME PER 15 MIN (STAT)

## 2017-12-06 PROCEDURE — 25000003 PHARM REV CODE 250: Performed by: HOSPITALIST

## 2017-12-06 PROCEDURE — 80053 COMPREHEN METABOLIC PANEL: CPT

## 2017-12-06 PROCEDURE — 85025 COMPLETE CBC W/AUTO DIFF WBC: CPT

## 2017-12-06 PROCEDURE — 99232 SBSQ HOSP IP/OBS MODERATE 35: CPT | Mod: ,,, | Performed by: PSYCHIATRY & NEUROLOGY

## 2017-12-06 PROCEDURE — 25000003 PHARM REV CODE 250: Performed by: INTERNAL MEDICINE

## 2017-12-06 RX ORDER — CIPROFLOXACIN 500 MG/1
500 TABLET ORAL EVERY 12 HOURS
Qty: 10 TABLET | Refills: 0 | Status: SHIPPED | OUTPATIENT
Start: 2017-12-06 | End: 2018-06-18

## 2017-12-06 RX ADMIN — VANCOMYCIN HYDROCHLORIDE 1000 MG: 1 INJECTION, POWDER, LYOPHILIZED, FOR SOLUTION INTRAVENOUS at 05:12

## 2017-12-06 RX ADMIN — OXYCODONE HYDROCHLORIDE AND ACETAMINOPHEN 1 TABLET: 10; 325 TABLET ORAL at 05:12

## 2017-12-06 RX ADMIN — CIPROFLOXACIN 500 MG: 500 TABLET, FILM COATED ORAL at 08:12

## 2017-12-06 RX ADMIN — OXYCODONE HYDROCHLORIDE AND ACETAMINOPHEN 1 TABLET: 10; 325 TABLET ORAL at 02:12

## 2017-12-06 RX ADMIN — DULOXETINE HYDROCHLORIDE 90 MG: 30 CAPSULE, DELAYED RELEASE ORAL at 08:12

## 2017-12-06 RX ADMIN — OXYCODONE HYDROCHLORIDE AND ACETAMINOPHEN 1 TABLET: 10; 325 TABLET ORAL at 09:12

## 2017-12-06 RX ADMIN — GABAPENTIN 300 MG: 300 CAPSULE ORAL at 05:12

## 2017-12-06 RX ADMIN — GABAPENTIN 300 MG: 300 CAPSULE ORAL at 01:12

## 2017-12-06 NOTE — PLAN OF CARE
Problem: Patient Care Overview  Goal: Plan of Care Review  Outcome: Ongoing (interventions implemented as appropriate)  Patient AA&O. Medicated for pain once during shift, full relief obtained.Instructed patient to turn form side to side to prevent skin breakdown. Verbalized understanding. Instructed to call for assistance as needed during night to go to restroom . Verbalized understanding , call light in reach. Bed in low & locked position.

## 2017-12-06 NOTE — DISCHARGE SUMMARY
Discharge Summary  Hospital Medicine    Admit Date: 12/2/2017    Date and Time: 12/6/201711:11 AM    Discharge Attending Physician: Mulu Joseph MD    Primary Care Physician: Gregory Jauregui Iv, MD    Diagnoses:  Active Hospital Problems    Diagnosis  POA    *Ataxia [R27.0]  Peripheral neuropathy  Yes    Mass of right lung [R91.8]  Yes    Severe malnutrition [E43]  UTI with Enetrococcus sp.  Micrococcus bacteremia (likely contamination)  Yes        Discharged Condition: Good    Hospital Course:   Queta Ocampo is a 72 y.o. female with who presents to the ED via EMS with an onset of worsening SOB and weakness/falls x2-3 days. The patient reported that she was discharged from [facility], where she was diagnosed with PNA and a lung mass. She was prescribed Z-Shade, which she finished on the day of presentation. The patient stated that she currently smokes about a half a pack of cigarettes a day, and she uses an albuterol inhaler every 4 hours at home. The patient's caretaker states that she has refused to get out of bed or to eat for the past few days. The patient endorses 1 episode of diarrhea, but denies fever, vomiting, blood in stool, hematuria, dysuria, chest pain, and abdominal pain. There is no pertinent SHx noted. Patient was admitted to Hospitalist medicine service. Patient was evaluated by neurologist. Patient underwent further testing. Neurologyteam was not convinced of NPH. Likely suffering with peripheral neuropathy. Fall precautions discussed with patient. Home PT and OT is arranged. Smoking cessation counseling performed. Dangers of cigarette smoking were reviewed with patient in detail and patient was encouraged to quit. Nicotine replacement options were discussed for > 3 minutes..Patient planning to follow up with Dr. ALVARO Mccarthy from Oncology for lung mass management. Patient also treated for UTI. Antibiotics adjusted according to urine culture results. Patient was discharged home in stable  condition with following discharge plan of care. Total time with the patient was 30 minutes and greater than 50% was spent in counseling and coordination of care. The assessment and plan have been discussed at length. Physicians' notes reviewed. Labs and procedure reviewed.     Consults: Dr. Posada    Significant Diagnostic Studies:   CXR: Small right upper lobe infiltrate consistent with pneumonia.     CT Head:  Mild brain atrophy more prominent in the frontal lobes otherwise negative head CT.     FL Lumbar puncture: 20 cc clear colorless CSF withdrawn from the lumbar thecal sac. Opening pressure was 17 cm, closing pressure was 7.5 cm water.     MRI brain: There is no acute abnormality.  There is generalized volume loss with mild nonspecific white matter change.  There is no intracranial hemorrhage, mass effect or acute infarction.    Microbiology Results (last 7 days)     Procedure Component Value Units Date/Time    Blood culture #1 **CANNOT BE ORDERED STAT** [[id number]] Collected:  12/02/17 1504    Order Status:  Completed Specimen:  Blood from Peripheral, Right  Hand Updated:  12/06/17 0922     Blood Culture, Routine Gram stain aer bottle: Gram positive cocci in clusters resembling Staph      Blood Culture, Routine Results called to and read back by: Argelia Kimball RN  12/05/2017  01:47     Blood Culture, Routine --     MICROCOCCUS SPECIES  Organism is a probable contaminant      CSF culture [[id number]] Collected:  12/03/17 1515    Order Status:  Completed Specimen:  CSF (Spinal Fluid) Updated:  12/06/17 0910     CSF CULTURE No Growth to date     Gram Stain Result Cytospin indicates:      No organisms seen    Blood culture #2 **CANNOT BE ORDERED STAT** [[id number]] Collected:  12/02/17 1449    Order Status:  Completed Specimen:  Blood Updated:  12/06/17 0612     Blood Culture, Routine No Growth to date     Blood Culture, Routine No Growth to date     Blood Culture, Routine No Growth to date     Blood Culture,  Routine No Growth to date    Urine Culture [[id number]]  (Susceptibility) Collected:  12/02/17 2130    Order Status:  Completed Specimen:  Urine from Urine Updated:  12/05/17 0212     Urine Culture, Routine --     ENTEROCOCCUS FAECALIS  50,000 - 99,999 cfu/ml  No other significant isolate      CSF culture [[id number]]     Order Status:  Canceled Specimen:  CSF (Spinal Fluid)         Special Treatments/Procedures: None  Disposition: Home or Self Care    Medications:  Reconciled Home Medications: Current Discharge Medication List      START taking these medications    Details   ciprofloxacin HCl (CIPRO) 500 MG tablet Take 1 tablet (500 mg total) by mouth every 12 (twelve) hours.  Qty: 10 tablet, Refills: 0         CONTINUE these medications which have NOT CHANGED    Details   atenolol (TENORMIN) 25 MG tablet Take 25 mg by mouth once daily.      duloxetine (CYMBALTA) 60 MG capsule Take 90 mg by mouth once daily.       gabapentin (NEURONTIN) 600 MG tablet       mirtazapine (REMERON) 30 MG tablet       oxycodone-acetaminophen (PERCOCET)  mg per tablet TAKE 1 TABLET BY MOUTH EVERY 8 HOURS AS NEEDED FOR 20 DAYS  Refills: 0      pantoprazole (PROTONIX) 20 MG tablet Take 1 tablet (20 mg total) by mouth once daily.  Qty: 30 tablet, Refills: 0      quetiapine (SEROQUEL) 50 MG tablet Take 150 mg by mouth every evening.         STOP taking these medications       buPROPion (WELLBUTRIN XL) 150 MG TB24 tablet Comments:   Reason for Stopping:         cefUROXime (CEFTIN) 250 MG tablet Comments:   Reason for Stopping:         clonazePAM (KLONOPIN) 0.5 MG tablet Comments:   Reason for Stopping:         cloNIDine (CATAPRES) 0.1 MG tablet Comments:   Reason for Stopping:         diphenoxylate-atropine 2.5-0.025 mg (LOMOTIL) 2.5-0.025 mg per tablet Comments:   Reason for Stopping:         gabapentin (NEURONTIN) 300 MG capsule Comments:   Reason for Stopping:         HYDROmorphone (DILAUDID) 4 MG tablet Comments:   Reason for  Stopping:         ketorolac (TORADOL) 10 mg tablet Comments:   Reason for Stopping:         levofloxacin (LEVAQUIN) 500 MG tablet Comments:   Reason for Stopping:         oxycodone (OXYCONTIN) 20 mg 12 hr tablet Comments:   Reason for Stopping:         SEROQUEL  mg Tb24 Comments:   Reason for Stopping:         sertraline (ZOLOFT) 25 MG tablet Comments:   Reason for Stopping:         sertraline (ZOLOFT) 50 MG tablet Comments:   Reason for Stopping:         VENTOLIN HFA 90 mcg/actuation inhaler Comments:   Reason for Stopping:               Discharge Procedure Orders  Referral to Home health   Referral Priority: Routine Referral Type: Home Health   Referral Reason: Specialty Services Required    Requested Specialty: Home Health Services    Number of Visits Requested: 1      Diet general   Order Comments: Cardiac/ 2 gram sodium low cholesterol diet     Diet Diabetic 1800 Calories     Other restrictions (specify):   Order Comments: Fall precautions   Scheduling Instructions: Use walker for ambulation     Call MD for:   Order Comments: For worsening symptoms, chest pain, shortness of breath, increased abdominal pain, high grade fever, stroke or stroke like symptoms, immediately go to the nearest Emergency Room or call 911 as soon as possible.       Follow-up Information     Gregory Jauregui Iv, MD In 1 week.    Specialty:  Family Medicine  Contact information:  [address]   [place] [address]  [phone]             Júnior Clark MD In 2 weeks.    Specialties:  Vascular Neurology, Neurology  Contact information:  [address] [facility] [address]  [address] [place] [id number]  [phone]             Please follow up.    Contact information:  Please stop smoking and follow up with ojncologist for lung mass followup.

## 2017-12-06 NOTE — PLAN OF CARE
SSC met with patient at the bedside Re: home health arrangement. Patient signed patient choice disclosure form requesting Nadia SCHULTZ. [facility] health [facility] via [facility], awaiting approval [facility]    1215- The referral for the patient in [facility], room 312, bed 312 A admitted at 12/2/2017 2:05 PM has been updated by angela.Update: Under Review by Nadia SCHULTZ     1322-The referral for the patient in [facility], room 312, bed 312 A admitted at 12/2/2017 2:05 PM has been updated by angela.Update: Accepted by Nadia SCHULTZ

## 2017-12-06 NOTE — PROGRESS NOTES
Discharge orders placed on pt.  Discharge instructions given to pt.  Pt verbalized understanding of instructions.  IV removed, no redness or swelling at site.  Prescription given to pt.  Pt left hospital without injury.

## 2017-12-06 NOTE — PLAN OF CARE
Problem: Patient Care Overview  Goal: Plan of Care Review  Outcome: Ongoing (interventions implemented as appropriate)  Pt has Q4 PRN duoneb treatments, no treatment needed at this time

## 2017-12-06 NOTE — CONSULTS
INPATIENT NEUROLOGY PROGRESS NOTE      Patient Name: Queta Ocampo  MRN: [id number]  Admission Date: 12/2/2017  Hospital Length of Stay: 4 days  Code Status: Full Code   Attending Provider: Mulu Joseph MD  Primary Care Physician: Gregory Jauregui Iv, MD   Principal Problem:Ataxia      INTERVAL HISTORY:    Ms. Ocampo is a lady with recent pneumonia and found to have lung mass who was admitted 4 days ago for gait ataxia thought to be secondary to normal pressure hydrocephalus and she underwent basic workup for this. She was found to have enterococcus UTI and is now on vancomycin and cipro.     Patient reports she was having a difficult time walking for one month, now feels at her baseline.     FOCUSED PHYSICAL EXAM:    Vital Signs:   Vitals:    12/06/17 0951   BP: (!) 103/55   Pulse: 76   Resp: 16   Temp: 98.5 °F (36.9 °C)      She is alert and oriented to situation and follows multi step commands.  Visual fields are full  EOMI  No masked facies   No facial asymmetry in strength or sensation   Tone in arms and legs is normal  No cogwheeling  No tremor   No bradykinesia  No dysmetria  Mild loss of light touch and temperature sensation in the feet, mild loss of vibration in the toes (8 sec), no loss of proprioception. Romberg with slight sway.   Gait is narrow based, toes pointed forward, slightly shuffling. There is no gait apraxia.   1 step or less on retropulsion    DATA:    I have personally reviewed the referring provider's notes, labs, & imaging made available to me today.     LABORATORY STUDIES:  Recent Results (from the past 24 hour(s))   Comprehensive Metabolic Panel (CMP)    Collection Time: 12/06/17  5:52 AM   Result Value Ref Range    Sodium 138 136 - 145 mmol/L    Potassium 3.9 3.5 - 5.1 mmol/L    Chloride 102 95 - 110 mmol/L    CO2 26 23 - 29 mmol/L    Glucose 111 (H) 70 - 110 mg/dL    BUN, Bld 14 8 - 23 mg/dL    Creatinine 0.9 0.5 - 1.4 mg/dL    Calcium 9.1 8.7 - 10.5 mg/dL    Total Protein 6.4 6.0 - 8.4 g/dL     Albumin 2.5 (L) 3.5 - 5.2 g/dL    Total Bilirubin 0.2 0.1 - 1.0 mg/dL    Alkaline Phosphatase 51 (L) 55 - 135 U/L    AST 11 10 - 40 U/L    ALT 5 (L) 10 - 44 U/L    Anion Gap 10 8 - 16 mmol/L    eGFR if African American >60 >60 mL/min/1.73 m^2    eGFR if non African American >60 >60 mL/min/1.73 m^2   Magnesium    Collection Time: 12/06/17  5:52 AM   Result Value Ref Range    Magnesium 1.7 1.6 - 2.6 mg/dL   Phosphorus    Collection Time: 12/06/17  5:52 AM   Result Value Ref Range    Phosphorus 4.0 2.7 - 4.5 mg/dL   CBC with Automated Differential    Collection Time: 12/06/17  5:52 AM   Result Value Ref Range    WBC 6.00 3.90 - 12.70 K/uL    RBC 3.63 (L) 4.00 - 5.40 M/uL    Hemoglobin 11.3 (L) 12.0 - 16.0 g/dL    Hematocrit 33.6 (L) 37.0 - 48.5 %    MCV 93 82 - 98 fL    MCH 31.1 (H) 27.0 - 31.0 pg    MCHC 33.5 32.0 - 36.0 g/dL    RDW 13.5 11.5 - 14.5 %    Platelets 310 150 - 350 K/uL    MPV 7.1 (L) 9.2 - 12.9 fL    Gran # 2.8 1.8 - 7.7 K/uL    Lymph # 2.4 1.0 - 4.8 K/uL    Mono # 0.5 0.3 - 1.0 K/uL    Eos # 0.2 0.0 - 0.5 K/uL    Baso # 0.10 0.00 - 0.20 K/uL    Gran% 46.7 38.0 - 73.0 %    Lymph% 40.8 18.0 - 48.0 %    Mono% 8.7 4.0 - 15.0 %    Eosinophil% 2.8 0.0 - 8.0 %    Basophil% 1.0 0.0 - 1.9 %    Differential Method Automated        RADIOLOGY STUDIES:  I have personally reviewed the pertinent images performed.   Imaging Results          MRI Brain Without Contrast (Final result)  Result time 12/05/17 08:48:59    Final result by Donald Clarke MD (12/05/17 08:48:59)                 Impression:         1. There is no acute abnormality.  There is generalized volume loss with mild nonspecific white matter change.  There is no intracranial hemorrhage, mass effect or acute infarction.      Electronically signed by: Dr. Donald Clarke  Date:     12/05/17  Time:    08:48              Narrative:    EXAM: Brain MRI without contrast.    INDICATION: Unsteady gait    TECHNIQUE: Routine multiplanar, multisequence brain  MRI was performed. DWI was performed. ADC map was generated.     COMPARISON: Head CT dated 12/03/2017      FINDINGS:     Intracranial contents: There is no acute intracranial abnormality.  There is mild to moderate generalized volume loss with a mild burden of periventricular and subcortical white matter T2 prolongation.  There is no intracranial hemorrhage or mass effect.  There are no regions of restricted diffusion to suggest acute infarction.  There is also nonspecific flair and T2 hyperintense signal throughout the santana.  These findings likely reflect sequelae of chronic microvascular ischemic disease.  There may be a small nonspecific 5 mm anterior interhemispheric fissural cyst.  The basilar cisterns are open.  Flow-voids indicating patency are present in the major vessels at the base of the brain.  The cerebellar tonsils are in normal position.  The sellar structures are normal.  The patient has had lens replacement surgery.  Otherwise, the orbits are grossly normal.  On whole brain imaging, the cerebellopontine angles and internal auditory canals appear normal without mass.    Extracranial contents: There is normal marrow signal intensity in the clivus and calvarium.  There is mild scattered mucosal thickening in the paranasal sinuses and in the inferior mastoid air cells.                             FL Lumbar Puncture (xpd) (Final result)  Result time 12/03/17 15:58:23    Final result by Saulo Pardo Jr., MD (12/03/17 15:58:23)                 Impression:     20 cc clear colorless CSF withdrawn from the lumbar thecal sac. Opening pressure was 17 cm, closing pressure was 7.5 cm water.      Electronically signed by: Saulo Pardo MD  Date:     12/03/17  Time:    15:58              Narrative:    History is of ataxia. Following informed consent of the patient and her daughter the patient was placed prone on the x-ray table and following local prep and local anesthesia a 20-gauge spinal needle was  advanced into the lumbar thecal sac at L2-3 without difficulty.    Clear CSF was obtained. Opening pressure was 17 cm water. The closing pressure was 7.5 cm water. 20 cc of clear colorless CSF were obtained in 4 tubes and sent to the laboratory. The needle was withdrawn and a Band-Aid placed.                             CT Head Without Contrast (Final result)  Result time 12/03/17 09:25:18    Final result by Saulo Pardo Jr., MD (12/03/17 09:25:18)                 Impression:     Mild brain atrophy more prominent in the frontal lobes otherwise negative head CT.      Electronically signed by: Saulo Pardo MD  Date:     12/03/17  Time:    09:25              Narrative:    Axial images are obtained through the cranium and displayed at brain and bone windows.    No cranial fracture is identified.    The basal cisterns are clear and symmetric.  There is no mass-effect or midline shift.  There is mild enlargement of the lateral ventricles, cerebral sulci and sylvian fissures consistent with brain atrophy. There is increase in the subarachnoid space particularly around the frontal lobes.  Focal areas of increased or decreased CT density consistent with tumor, edema, CVA or hemorrhage are not seen.  No intracranial hemorrhage or hematoma is noted.                             X-Ray Chest 1 View (Final result)  Result time 12/02/17 15:14:16    Final result by Saulo Pardo Jr., MD (12/02/17 15:14:16)                 Impression:     Small right upper lobe infiltrate consistent with pneumonia.      Electronically signed by: Saulo Pardo MD  Date:     12/02/17  Time:    15:14              Narrative:    Single AP view of the chest is performed. Mediastinal cardiac size and contour are normal. There is a new small infiltrate in the right upper lobe. Rest lung fields are clear. No pneumothorax is seen.                              ASSESSMENT:    1. Gait instability - secondary to sensory ataxia from mild peripheral  neuropathy, no NPH   2. Suspected peripheral neuropathy - neg basic workup (TSH, B12, folate, RPR all neg/normal)  3. Enterococcus UTI  - on antibiotics  4. Lung mass - needs oncology referral     Ms. Ocampo is a lady with recent pneumonia and recently found lung mass who presented with 2-3 days of weakness and falls and one episode of urinary intontinence likely related to her urinary tract infection. She was suspected to have NPH on admission and underwent 20cc Csf removal with questionable improvement in gait afterwards. Neurologist Dr. Enoch Clark consulted and recommended MRI brain and neuropathy labs.    On my exam today she has no exam features consistent with NPH (wide based gait, toes pointed out, gait apraxia, postural instability) instead she has a narrow based shuffling gait with good maneuvering of turns and no retropulsion. She does have evidence of mild peripheral neuropathy in her feet and I believe this is where her gait instability has come from, acutely increased in the face of 2 recent infections. She underwent brain MRI which I reviewed myself. This does not have any characteristic features of NPH as there is no ventriculomegaly, frontal horn span is <5cm (3.5cm), Grider ratio is <0.4 (0.2) and there is no transependymal flow. She does not need any further workup for this matter.     RECOMMENDATIONS:    -- patient would benefit from PT and OT for gait training  -- patient needs no further neurological workup  -- should follow up with neurology in 3 months    Libertad Mcwilliams MD  Neurologist

## 2017-12-06 NOTE — PLAN OF CARE
Problem: Patient Care Overview  Goal: Plan of Care Review  Outcome: Ongoing (interventions implemented as appropriate)  Pt alert and oriented. Neuro checks q4 hours. Pain medication given as needed. Pt refusing safety socks. Reinforced education about leaving the floor. No injuries or falls this shift. No new symptoms noted. Up to bathroom, educated to call for assistance. Call light in reach. Will continue to monitor.

## 2017-12-06 NOTE — PLAN OF CARE
Problem: Patient Care Overview  Goal: Plan of Care Review  Outcome: Revised  Pt is AAOx4.  Pt c/o pain, prn medication given, pt tolerated well.  Pt denied N/V throughout shift.  Pt is continent of B and B, up with stand by assist.  IV saline locked, flushed, no redness or swelling at site.  Neuro checks Q 4 hr.  VS stable, in NAD, pt remains afebrile.  Pt remains free from injury.  Bed in low position, wheels locked, call light within reach.  Pt verbalized understanding of POC.  Will continue to monitor.

## 2017-12-06 NOTE — PT/OT/SLP PROGRESS
Physical Therapy      Patient Name:  Queta Ocampo   MRN:  [id number]    Patient not seen today secondary to patient refusing to get OOB this morning reporting that she was too fatigued and requesting for PT to return this Pm. PT followed up and Patient unwilling to participate (Patient reporting that she is discharging later today and will not participate in activities this PM. Patient encouraged; however, patient continued to decline reporting she wanted to remain in her bedside chair. ). Will follow-up tomorrow.    Beni Encinas, PT

## 2017-12-07 ENCOUNTER — PATIENT OUTREACH (OUTPATIENT)
Dept: ADMINISTRATIVE | Facility: CLINIC | Age: 72
End: 2017-12-07

## 2017-12-07 LAB
BACTERIA BLD CULT: NORMAL

## 2017-12-07 NOTE — PROGRESS NOTES
C3 nurse attempted to contact patient. No answer. The following message was left for the patient to return the call:  Good afternoon  I am a nurse calling on behalf of [facility] from the [facility]ination [facility].  This is a Transitional Care Call for Queta Ocampo . When you have a moment please contact us at [phone] or [phone] Monday through Friday, between the hours of 8 am to 4 pm. We look forward to speaking with you. On behalf of [facility] have a nice day.    The patient has a scheduled HOSFU appointment with Dr. Gregory Jauregui on 12/13/17 @ 9am. Non [facility] Provider, unable to route at this time.

## 2017-12-07 NOTE — PLAN OF CARE
12/07/17 0812   Final Note   Assessment Type Final Discharge Note   Discharge Disposition Home-Health

## 2017-12-08 LAB — BACTERIA BLD CULT: NORMAL

## 2017-12-10 LAB
CMV SPEC QL SHELL VIAL CULT: NO GROWTH
GRAM STN SPEC: NORMAL
GRAM STN SPEC: NORMAL

## 2017-12-15 ENCOUNTER — OFFICE VISIT (OUTPATIENT)
Dept: HEMATOLOGY/ONCOLOGY | Facility: CLINIC | Age: 72
End: 2017-12-15
Payer: MEDICARE

## 2017-12-15 VITALS
WEIGHT: 136.31 LBS | RESPIRATION RATE: 18 BRPM | SYSTOLIC BLOOD PRESSURE: 114 MMHG | HEART RATE: 79 BPM | DIASTOLIC BLOOD PRESSURE: 75 MMHG | BODY MASS INDEX: 20.66 KG/M2 | HEIGHT: 68 IN | TEMPERATURE: 98 F

## 2017-12-15 DIAGNOSIS — J18.9 PNEUMONIA OF LEFT UPPER LOBE DUE TO INFECTIOUS ORGANISM: ICD-10-CM

## 2017-12-15 PROCEDURE — 99204 OFFICE O/P NEW MOD 45 MIN: CPT | Mod: ,,, | Performed by: INTERNAL MEDICINE

## 2017-12-15 RX ORDER — GABAPENTIN 300 MG/1
900 CAPSULE ORAL
COMMUNITY
Start: 2016-12-26 | End: 2018-06-18

## 2017-12-15 RX ORDER — MIRTAZAPINE 30 MG/1
30 TABLET, FILM COATED ORAL
COMMUNITY
Start: 2016-12-30 | End: 2018-06-18

## 2017-12-15 RX ORDER — DULOXETIN HYDROCHLORIDE 60 MG/1
90 CAPSULE, DELAYED RELEASE ORAL
COMMUNITY
End: 2018-06-18

## 2017-12-15 RX ORDER — AZITHROMYCIN 500 MG/1
TABLET, FILM COATED ORAL
COMMUNITY
Start: 2017-10-17 | End: 2018-06-18

## 2017-12-15 RX ORDER — BUSPIRONE HYDROCHLORIDE 10 MG/1
10 TABLET ORAL 3 TIMES DAILY
COMMUNITY
Start: 2017-07-08 | End: 2018-12-19 | Stop reason: CLARIF

## 2017-12-15 RX ORDER — AZITHROMYCIN 250 MG/1
TABLET, FILM COATED ORAL
COMMUNITY
Start: 2017-11-27 | End: 2018-06-18

## 2017-12-15 RX ORDER — DULOXETIN HYDROCHLORIDE 60 MG/1
60 CAPSULE, DELAYED RELEASE ORAL DAILY
COMMUNITY
Start: 2017-11-03 | End: 2020-02-13 | Stop reason: CLARIF

## 2017-12-15 RX ORDER — OXYCODONE AND ACETAMINOPHEN 7.5; 325 MG/1; MG/1
1 TABLET ORAL EVERY 4 HOURS PRN
COMMUNITY
Start: 2017-09-07 | End: 2018-12-19 | Stop reason: CLARIF

## 2017-12-15 RX ORDER — MONTELUKAST SODIUM 10 MG/1
TABLET ORAL
COMMUNITY
Start: 2017-10-17 | End: 2018-06-18

## 2017-12-15 RX ORDER — ATENOLOL 25 MG/1
TABLET ORAL
COMMUNITY
Start: 2017-06-05 | End: 2018-06-18 | Stop reason: SDUPTHER

## 2017-12-15 RX ORDER — TRAZODONE HYDROCHLORIDE 100 MG/1
100 TABLET ORAL
COMMUNITY
Start: 2017-12-12 | End: 2018-06-18

## 2017-12-15 RX ORDER — ALBUTEROL SULFATE 90 UG/1
2 AEROSOL, METERED RESPIRATORY (INHALATION)
COMMUNITY
Start: 2017-11-27 | End: 2018-06-18

## 2017-12-15 RX ORDER — BENZONATATE 200 MG/1
CAPSULE ORAL
COMMUNITY
Start: 2017-11-27 | End: 2018-06-18

## 2017-12-15 RX ORDER — BENZONATATE 100 MG/1
CAPSULE ORAL
COMMUNITY
Start: 2017-10-17 | End: 2018-06-18

## 2017-12-15 NOTE — LETTER
December 15, 2017      Gregory Jauregui IV, MD  [address]   [place] [address]           [facility]  [address]  [address]  [facility] [place] [address]  Phone: [phone]  Fax: [phone]          Patient: Queta Ocampo   MR Number: [id number]   YOB: 1945   Date of Visit: 12/15/2017       Dear Dr. Gregory Jauregui IV:    Thank you for referring Queta Ocampo to me for evaluation. Attached you will find relevant portions of my assessment and plan of care.    If you have questions, please do not hesitate to call me. I look forward to following Queta Ocampo along with you.    Sincerely,    MAE Mccarthy MD    Enclosure  CC:  No Recipients    If you would like to receive this communication electronically, please contact [email or web address] or [phone] to request more information on [facility] access.    For providers and/or their staff who would like to refer a patient to [facility], please contact us through our one-stop-shop provider referral line, [facility], at [phone].    If you feel you have received this communication in error or would no longer like to receive these types of communications, please e-mail [email or web address]
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
## 2018-05-22 ENCOUNTER — TELEPHONE (OUTPATIENT)
Dept: SURGERY | Facility: CLINIC | Age: 73
End: 2018-05-22

## 2018-05-22 NOTE — TELEPHONE ENCOUNTER
----- Message from Yanna Hubbard sent at 5/22/2018  9:26 AM CDT -----  Contact: Patient  Type:  Test Results    Who Called:  Patient  Name of Test (Lab/Mammo/Etc):  CT of chest  Date of Test:  5/21/18  Ordering Provider:  Shari  Where the test was performed:  [facility] Call Back Number:    Additional Information:

## 2018-06-03 ENCOUNTER — TELEPHONE (OUTPATIENT)
Dept: HEMATOLOGY/ONCOLOGY | Facility: CLINIC | Age: 73
End: 2018-06-03

## 2018-06-04 NOTE — TELEPHONE ENCOUNTER
Please tell her that the CAT showed that the pneumonia had cleared, and that no mass was seen in the chest.  Keep the appt in 8/2/18 to review her labs.

## 2018-06-18 ENCOUNTER — HOSPITAL ENCOUNTER (EMERGENCY)
Facility: HOSPITAL | Age: 73
Discharge: HOME | End: 2018-06-18
Attending: EMERGENCY MEDICINE
Payer: MEDICARE

## 2018-06-18 VITALS
BODY MASS INDEX: 21.22 KG/M2 | OXYGEN SATURATION: 99 % | HEIGHT: 68 IN | WEIGHT: 140 LBS | DIASTOLIC BLOOD PRESSURE: 85 MMHG | HEART RATE: 92 BPM | RESPIRATION RATE: 14 BRPM | SYSTOLIC BLOOD PRESSURE: 201 MMHG | TEMPERATURE: 99 F

## 2018-06-18 DIAGNOSIS — N12 PYELONEPHRITIS: Primary | ICD-10-CM

## 2018-06-18 DIAGNOSIS — N20.0 KIDNEY STONE ON LEFT SIDE: ICD-10-CM

## 2018-06-18 DIAGNOSIS — I71.40 AAA (ABDOMINAL AORTIC ANEURYSM) WITHOUT RUPTURE: ICD-10-CM

## 2018-06-18 LAB
ALBUMIN SERPL BCP-MCNC: 3.5 G/DL
ALP SERPL-CCNC: 60 U/L
ALT SERPL W/O P-5'-P-CCNC: 8 U/L
ANION GAP SERPL CALC-SCNC: 11 MMOL/L
AST SERPL-CCNC: 13 U/L
BACTERIA #/AREA URNS HPF: ABNORMAL /HPF
BASOPHILS # BLD AUTO: 0.1 K/UL
BASOPHILS NFR BLD: 0.8 %
BILIRUB SERPL-MCNC: 0.2 MG/DL
BILIRUB UR QL STRIP: NEGATIVE
BUN SERPL-MCNC: 16 MG/DL
CALCIUM SERPL-MCNC: 9.9 MG/DL
CHLORIDE SERPL-SCNC: 107 MMOL/L
CLARITY UR: CLEAR
CO2 SERPL-SCNC: 25 MMOL/L
COLOR UR: YELLOW
CREAT SERPL-MCNC: 0.9 MG/DL
DIFFERENTIAL METHOD: ABNORMAL
EOSINOPHIL # BLD AUTO: 0.1 K/UL
EOSINOPHIL NFR BLD: 1.2 %
ERYTHROCYTE [DISTWIDTH] IN BLOOD BY AUTOMATED COUNT: 13.9 %
EST. GFR  (AFRICAN AMERICAN): >60 ML/MIN/1.73 M^2
EST. GFR  (NON AFRICAN AMERICAN): >60 ML/MIN/1.73 M^2
GLUCOSE SERPL-MCNC: 108 MG/DL
GLUCOSE UR QL STRIP: NEGATIVE
HCT VFR BLD AUTO: 36.5 %
HGB BLD-MCNC: 12.5 G/DL
HGB UR QL STRIP: ABNORMAL
KETONES UR QL STRIP: NEGATIVE
LEUKOCYTE ESTERASE UR QL STRIP: ABNORMAL
LYMPHOCYTES # BLD AUTO: 2.6 K/UL
LYMPHOCYTES NFR BLD: 23.7 %
MCH RBC QN AUTO: 32.2 PG
MCHC RBC AUTO-ENTMCNC: 34.2 G/DL
MCV RBC AUTO: 94 FL
MICROSCOPIC COMMENT: ABNORMAL
MONOCYTES # BLD AUTO: 0.7 K/UL
MONOCYTES NFR BLD: 6.9 %
NEUTROPHILS # BLD AUTO: 7.3 K/UL
NEUTROPHILS NFR BLD: 67.4 %
NITRITE UR QL STRIP: NEGATIVE
PH UR STRIP: 7 [PH] (ref 5–8)
PLATELET # BLD AUTO: 196 K/UL
PMV BLD AUTO: 7.8 FL
POTASSIUM SERPL-SCNC: 3.8 MMOL/L
PROT SERPL-MCNC: 7 G/DL
PROT UR QL STRIP: NEGATIVE
RBC # BLD AUTO: 3.88 M/UL
RBC #/AREA URNS HPF: 14 /HPF (ref 0–4)
SODIUM SERPL-SCNC: 143 MMOL/L
SP GR UR STRIP: 1.02 (ref 1–1.03)
SQUAMOUS #/AREA URNS HPF: 10 /HPF
URN SPEC COLLECT METH UR: ABNORMAL
UROBILINOGEN UR STRIP-ACNC: NEGATIVE EU/DL
WBC # BLD AUTO: 10.8 K/UL
WBC #/AREA URNS HPF: >100 /HPF (ref 0–5)

## 2018-06-18 PROCEDURE — 87086 URINE CULTURE/COLONY COUNT: CPT

## 2018-06-18 PROCEDURE — 80053 COMPREHEN METABOLIC PANEL: CPT

## 2018-06-18 PROCEDURE — 96374 THER/PROPH/DIAG INJ IV PUSH: CPT

## 2018-06-18 PROCEDURE — 85025 COMPLETE CBC W/AUTO DIFF WBC: CPT

## 2018-06-18 PROCEDURE — 96361 HYDRATE IV INFUSION ADD-ON: CPT

## 2018-06-18 PROCEDURE — 96375 TX/PRO/DX INJ NEW DRUG ADDON: CPT

## 2018-06-18 PROCEDURE — 36415 COLL VENOUS BLD VENIPUNCTURE: CPT

## 2018-06-18 PROCEDURE — 81000 URINALYSIS NONAUTO W/SCOPE: CPT

## 2018-06-18 PROCEDURE — 25000003 PHARM REV CODE 250: Performed by: EMERGENCY MEDICINE

## 2018-06-18 PROCEDURE — 99284 EMERGENCY DEPT VISIT MOD MDM: CPT | Mod: 25

## 2018-06-18 PROCEDURE — 63600175 PHARM REV CODE 636 W HCPCS: Performed by: EMERGENCY MEDICINE

## 2018-06-18 RX ORDER — ERGOCALCIFEROL 1.25 MG/1
50000 CAPSULE ORAL
COMMUNITY
End: 2018-12-19 | Stop reason: CLARIF

## 2018-06-18 RX ORDER — NITROFURANTOIN 25; 75 MG/1; MG/1
100 CAPSULE ORAL 2 TIMES DAILY
Qty: 20 CAPSULE | Refills: 0 | Status: SHIPPED | OUTPATIENT
Start: 2018-06-18 | End: 2018-06-28

## 2018-06-18 RX ORDER — SULFAMETHOXAZOLE AND TRIMETHOPRIM 800; 160 MG/1; MG/1
1 TABLET ORAL
Status: COMPLETED | OUTPATIENT
Start: 2018-06-18 | End: 2018-06-18

## 2018-06-18 RX ORDER — SULFAMETHOXAZOLE AND TRIMETHOPRIM 800; 160 MG/1; MG/1
1 TABLET ORAL 2 TIMES DAILY
Qty: 20 TABLET | Refills: 0 | Status: SHIPPED | OUTPATIENT
Start: 2018-06-18 | End: 2018-06-28

## 2018-06-18 RX ORDER — KETOROLAC TROMETHAMINE 30 MG/ML
10 INJECTION, SOLUTION INTRAMUSCULAR; INTRAVENOUS
Status: COMPLETED | OUTPATIENT
Start: 2018-06-18 | End: 2018-06-18

## 2018-06-18 RX ORDER — NITROFURANTOIN 25; 75 MG/1; MG/1
100 CAPSULE ORAL
Status: COMPLETED | OUTPATIENT
Start: 2018-06-18 | End: 2018-06-18

## 2018-06-18 RX ORDER — ONDANSETRON 2 MG/ML
4 INJECTION INTRAMUSCULAR; INTRAVENOUS
Status: COMPLETED | OUTPATIENT
Start: 2018-06-18 | End: 2018-06-18

## 2018-06-18 RX ADMIN — NITROFURANTOIN (MONOHYDRATE/MACROCRYSTALS) 100 MG: 75; 25 CAPSULE ORAL at 09:06

## 2018-06-18 RX ADMIN — SULFAMETHOXAZOLE AND TRIMETHOPRIM 1 TABLET: 800; 160 TABLET ORAL at 09:06

## 2018-06-18 RX ADMIN — SODIUM CHLORIDE, SODIUM LACTATE, POTASSIUM CHLORIDE, AND CALCIUM CHLORIDE 1000 ML: .6; .31; .03; .02 INJECTION, SOLUTION INTRAVENOUS at 07:06

## 2018-06-18 RX ADMIN — ONDANSETRON 4 MG: 2 INJECTION INTRAMUSCULAR; INTRAVENOUS at 07:06

## 2018-06-18 RX ADMIN — KETOROLAC TROMETHAMINE 10 MG: 30 INJECTION, SOLUTION INTRAMUSCULAR at 07:06

## 2018-06-18 NOTE — ED PROVIDER NOTES
Encounter Date: 6/18/2018    SCRIBE #1 NOTE: I, Jama Gordon, am scribing for, and in the presence of, Dr. Eric.       History     Chief Complaint   Patient presents with    Abdominal Pain     low abdominal pain today. States is kidney stones     06/18/2018 6:48 PM     Chief complaint: Lower abdominal pain     Queta Ocampo is a 73 y.o. female who has a past medical history of Depression; Nervous breakdown; and Renal disorder.    The patient presents to the ED with an onset of acute intermittent lower abdominal pain with associated nausea, vomiting, and mild burning with urination. She reports that the pain began earlier today and is very similar to prior kidney stones that she has had. Her last kidney stone occurred about a year ago. She took a percocet earlier today which lowered her pain level to an 8/10. No pertinent Shx noted. She presents with no other acute complaints.         The history is provided by the patient.     Review of patient's allergies indicates:   Allergen Reactions    Codeine Itching, Swelling, Rash, Other (See Comments) and Nausea And Vomiting     Facial swelling, itching, rash, erythema    Pcn [penicillins] Itching, Swelling, Rash and Other (See Comments)     Facial swelling with rash, erythema, itching     Past Medical History:   Diagnosis Date    Depression     Nervous breakdown     Renal disorder      Past Surgical History:   Procedure Laterality Date    APPENDECTOMY      TUBAL LIGATION       History reviewed. No pertinent family history.  Social History   Substance Use Topics    Smoking status: Current Every Day Smoker     Packs/day: 0.50     Types: Cigarettes    Smokeless tobacco: Not on file    Alcohol use Not on file     Review of Systems   Constitutional: Negative for chills, fatigue and fever.        Denies generalized weakness.   HENT: Negative for ear pain, rhinorrhea and sore throat.    Eyes: Negative for pain, discharge and visual disturbance.   Respiratory: Negative  for cough, shortness of breath and wheezing.         Denies orthopnea. Denies dyspnea on exertion.   Cardiovascular: Negative for chest pain.   Gastrointestinal: Positive for abdominal pain (lower), nausea and vomiting. Negative for blood in stool, constipation and diarrhea.        Denies melena. Denies hematochezia.   Genitourinary: Positive for dysuria (mild). Negative for hematuria.        Denies swelling. Denies pelvic pain.   Musculoskeletal: Negative for back pain, joint swelling and neck pain.        Denies extremity pain.   Skin: Negative for pallor and rash.        Denies lesions.   Neurological: Negative for syncope, numbness and headaches.        Denies head trauma. No focal weakness.   Psychiatric/Behavioral: Negative for hallucinations and suicidal ideas.        Denies depression. Denies homicidal ideation. Denies auditory or visual hallucinations.       Physical Exam     Vitals:    06/18/18 1828   BP: (!) 201/85   Pulse: 92   Resp: 14   Temp: 98.7 °F (37.1 °C)      Physical Exam    Nursing note and vitals reviewed.  Constitutional: She appears well-developed and well-nourished. She is not diaphoretic. She appears distressed.   She hs mild distress with pain.   HENT:   Head: Normocephalic and atraumatic.   Mouth/Throat: Mucous membranes are dry.   Eyes: EOM are normal. Pupils are equal, round, and reactive to light.   Neck: Normal range of motion. Neck supple.   Cardiovascular: Normal rate, regular rhythm, normal heart sounds and intact distal pulses. Exam reveals no gallop and no friction rub.    No murmur heard.  Pulmonary/Chest: Breath sounds normal. No respiratory distress. She has no wheezes. She has no rhonchi. She has no rales.   Abdominal: Soft. She exhibits no distension. There is tenderness. There is no rebound and no guarding.   She has suprapubic tenderness to palpation .   There is no McBurney's point tenderness.  Negative Galvez's sign.   Musculoskeletal: Normal range of motion. She  exhibits tenderness. She exhibits no edema.   There is mild bilateral CVA tenderness noted.   Neurological: She is alert and oriented to person, place, and time. She has normal strength.   Skin: Skin is warm. No rash noted.   Psychiatric:   Lip smacking is noted consistent with tardive dyskinesia.         ED Course   Procedures  Labs Reviewed   CBC W/ AUTO DIFFERENTIAL - Abnormal; Notable for the following:        Result Value    RBC 3.88 (*)     Hematocrit 36.5 (*)     MCH 32.2 (*)     MPV 7.8 (*)     All other components within normal limits   COMPREHENSIVE METABOLIC PANEL - Abnormal; Notable for the following:     ALT 8 (*)     All other components within normal limits   URINALYSIS - Abnormal; Notable for the following:     Occult Blood UA Trace (*)     Leukocytes, UA 1+ (*)     All other components within normal limits   URINALYSIS MICROSCOPIC - Abnormal; Notable for the following:     RBC, UA 14 (*)     WBC, UA >100 (*)     Bacteria, UA Moderate (*)     All other components within normal limits   CULTURE, URINE          Imaging Results          CT Renal Stone Study ABD Pelvis WO (In process)               X-Rays:   Independently Interpreted Readings:   Abdomen: There is evidence of a non-obstructive left side kidney stone that is 7 mm long along with constipation and a non-ruptured infrarenal AAA measuring 3,3,4 cm.     Medical Decision Making:   History:   Old Medical Records: I decided to obtain old medical records.  Initial Assessment:   This is an emergent evaluation. My differential includes: kidney stone, pyleonenephritis, UTI, Cystitis. IV fluids, Toradol, Zofran, labs, urinalysis, and CT have been ordered. I will reassess.  Clinical Tests:   Lab Tests: Ordered and Reviewed  Radiological Study: Ordered and Reviewed  ED Management:  8:45 PM  The patient's lab studies show no clinically significant abnormalities. Her urinalysis is consistent with a UTI. Due to patient's symptoms, I believe she has  pyelonephritis. I will double cover with bactrim and Macrobid as she is allergic to penicillin. Her CT is pending.     9:01 PM  The patient appears clinically stable without toxicity. She has no evidence of any infected, obstructing stone. I will discharge with bactrim, Macrobid, and a close follow up with PCP and urology. I instructed her to discuss the incidental finding of her AAA with PCP.            Scribe Attestation:   Scribe #1: I performed the above scribed service and the documentation accurately describes the services I performed. I attest to the accuracy of the note.    I, Dr. Ashvin Eric, personally performed the services described in this documentation. All medical record entries made by the scribe were at my direction and in my presence.  I have reviewed the chart and agree that the record reflects my personal performance and is accurate and complete. Ashvin Eric MD.  10:01 PM 06/18/2018             Clinical Impression:     1. Pyelonephritis    2. AAA (abdominal aortic aneurysm) without rupture    3. Kidney stone on left side            Disposition:   Disposition: Discharged  Condition: Stable                        Ashvin Eric MD  06/18/18 7559

## 2018-06-19 ENCOUNTER — PES CALL (OUTPATIENT)
Dept: ADMINISTRATIVE | Facility: CLINIC | Age: 73
End: 2018-06-19

## 2018-06-20 LAB
BACTERIA UR CULT: NORMAL
BACTERIA UR CULT: NORMAL

## 2018-07-02 ENCOUNTER — HOSPITAL ENCOUNTER (EMERGENCY)
Facility: HOSPITAL | Age: 73
Discharge: HOME | End: 2018-07-02
Attending: EMERGENCY MEDICINE
Payer: MEDICARE

## 2018-07-02 VITALS
TEMPERATURE: 98 F | SYSTOLIC BLOOD PRESSURE: 134 MMHG | OXYGEN SATURATION: 98 % | HEART RATE: 85 BPM | DIASTOLIC BLOOD PRESSURE: 78 MMHG | HEIGHT: 68 IN | BODY MASS INDEX: 21.22 KG/M2 | WEIGHT: 140 LBS | RESPIRATION RATE: 18 BRPM

## 2018-07-02 DIAGNOSIS — R10.9 LEFT FLANK PAIN: Primary | ICD-10-CM

## 2018-07-02 DIAGNOSIS — E86.0 DEHYDRATION: ICD-10-CM

## 2018-07-02 LAB
ALBUMIN SERPL BCP-MCNC: 3.9 G/DL
ALP SERPL-CCNC: 64 U/L
ALT SERPL W/O P-5'-P-CCNC: 9 U/L
ANION GAP SERPL CALC-SCNC: 10 MMOL/L
AST SERPL-CCNC: 12 U/L
BASOPHILS # BLD AUTO: 0.1 K/UL
BASOPHILS NFR BLD: 0.6 %
BILIRUB SERPL-MCNC: 0.2 MG/DL
BILIRUB UR QL STRIP: NEGATIVE
BUN SERPL-MCNC: 19 MG/DL
CALCIUM SERPL-MCNC: 10.1 MG/DL
CHLORIDE SERPL-SCNC: 101 MMOL/L
CLARITY UR: CLEAR
CO2 SERPL-SCNC: 28 MMOL/L
COLOR UR: YELLOW
CREAT SERPL-MCNC: 1.4 MG/DL
DIFFERENTIAL METHOD: ABNORMAL
EOSINOPHIL # BLD AUTO: 0.1 K/UL
EOSINOPHIL NFR BLD: 1.3 %
ERYTHROCYTE [DISTWIDTH] IN BLOOD BY AUTOMATED COUNT: 13.6 %
EST. GFR  (AFRICAN AMERICAN): 43 ML/MIN/1.73 M^2
EST. GFR  (NON AFRICAN AMERICAN): 37 ML/MIN/1.73 M^2
GLUCOSE SERPL-MCNC: 127 MG/DL
GLUCOSE UR QL STRIP: NEGATIVE
HCT VFR BLD AUTO: 38.6 %
HGB BLD-MCNC: 13.1 G/DL
HGB UR QL STRIP: ABNORMAL
KETONES UR QL STRIP: NEGATIVE
LEUKOCYTE ESTERASE UR QL STRIP: NEGATIVE
LYMPHOCYTES # BLD AUTO: 1.4 K/UL
LYMPHOCYTES NFR BLD: 14 %
MCH RBC QN AUTO: 32.2 PG
MCHC RBC AUTO-ENTMCNC: 33.9 G/DL
MCV RBC AUTO: 95 FL
MONOCYTES # BLD AUTO: 0.5 K/UL
MONOCYTES NFR BLD: 4.5 %
NEUTROPHILS # BLD AUTO: 8 K/UL
NEUTROPHILS NFR BLD: 79.6 %
NITRITE UR QL STRIP: NEGATIVE
PH UR STRIP: 6 [PH] (ref 5–8)
PLATELET # BLD AUTO: 253 K/UL
PMV BLD AUTO: 8.1 FL
POTASSIUM SERPL-SCNC: 4.1 MMOL/L
PROT SERPL-MCNC: 7.9 G/DL
PROT UR QL STRIP: NEGATIVE
RBC # BLD AUTO: 4.08 M/UL
SODIUM SERPL-SCNC: 139 MMOL/L
SP GR UR STRIP: 1.02 (ref 1–1.03)
URN SPEC COLLECT METH UR: ABNORMAL
UROBILINOGEN UR STRIP-ACNC: NEGATIVE EU/DL
WBC # BLD AUTO: 10 K/UL

## 2018-07-02 PROCEDURE — 81003 URINALYSIS AUTO W/O SCOPE: CPT

## 2018-07-02 PROCEDURE — 25000003 PHARM REV CODE 250: Performed by: PHYSICIAN ASSISTANT

## 2018-07-02 PROCEDURE — 80053 COMPREHEN METABOLIC PANEL: CPT

## 2018-07-02 PROCEDURE — 63600175 PHARM REV CODE 636 W HCPCS: Performed by: PHYSICIAN ASSISTANT

## 2018-07-02 PROCEDURE — 99284 EMERGENCY DEPT VISIT MOD MDM: CPT | Mod: 25

## 2018-07-02 PROCEDURE — 96374 THER/PROPH/DIAG INJ IV PUSH: CPT

## 2018-07-02 PROCEDURE — 85025 COMPLETE CBC W/AUTO DIFF WBC: CPT

## 2018-07-02 PROCEDURE — 36415 COLL VENOUS BLD VENIPUNCTURE: CPT

## 2018-07-02 RX ORDER — MORPHINE SULFATE 4 MG/ML
4 INJECTION, SOLUTION INTRAMUSCULAR; INTRAVENOUS
Status: COMPLETED | OUTPATIENT
Start: 2018-07-02 | End: 2018-07-02

## 2018-07-02 RX ORDER — ONDANSETRON 4 MG/1
4 TABLET, ORALLY DISINTEGRATING ORAL
Status: COMPLETED | OUTPATIENT
Start: 2018-07-02 | End: 2018-07-02

## 2018-07-02 RX ORDER — SODIUM CHLORIDE 9 MG/ML
INJECTION, SOLUTION INTRAVENOUS
Status: DISCONTINUED
Start: 2018-07-02 | End: 2018-07-02 | Stop reason: HOSPADM

## 2018-07-02 RX ORDER — METHOCARBAMOL 500 MG/1
500 TABLET, FILM COATED ORAL 3 TIMES DAILY
Qty: 21 TABLET | Refills: 0 | Status: SHIPPED | OUTPATIENT
Start: 2018-07-02 | End: 2018-07-09

## 2018-07-02 RX ADMIN — MORPHINE SULFATE 4 MG: 4 INJECTION INTRAVENOUS at 04:07

## 2018-07-02 RX ADMIN — SODIUM CHLORIDE 1000 ML: 0.9 INJECTION, SOLUTION INTRAVENOUS at 05:07

## 2018-07-02 RX ADMIN — ONDANSETRON 4 MG: 4 TABLET, ORALLY DISINTEGRATING ORAL at 04:07

## 2018-07-02 NOTE — DISCHARGE INSTRUCTIONS
You received 20 percocet on June 29th. You can take those in addition to the prescribed muscle relaxer. Follow up with your primary care provider  For worsening symptoms, chest pain, shortness of breath, increased abdominal pain, high grade fever, stroke or stroke like symptoms, immediately go to the nearest Emergency Room or call 911 as soon as possible.

## 2018-07-02 NOTE — ED PROVIDER NOTES
Encounter Date: 7/2/2018    SCRIBE #1 NOTE: I, Severiano Rubin, am scribing for, and in the presence of, Amina Diallo PA-C.       History     Chief Complaint   Patient presents with    Flank Pain     left      Time seen by provider: 3:56 PM on 07/02/2018    Chief Complaint: abdominal pain     Queta Ocampo is a 73 y.o. female with a PMHx of depression, renal disorder, and AAA who presents to the ED for further evaluation of radiating abdominal pain that started around 12:30 pm today. Patient reports that this pain is left sided with the onset being abrupt and radiates into her back. She states that this pain is a ripping pain that has been constant since onset. Patient admits that she was previously nauseated, but it has resolved since onset. Patient states that she has had frequent UTI's. Patient denies urinary symptoms, SOB, chest pain, and fever. Patient has a PSHx of appendectomy.           The history is provided by the patient.     Review of patient's allergies indicates:   Allergen Reactions    Codeine Itching, Swelling, Rash, Other (See Comments) and Nausea And Vomiting     Facial swelling, itching, rash, erythema    Pcn [penicillins] Itching, Swelling, Rash and Other (See Comments)     Facial swelling with rash, erythema, itching     Past Medical History:   Diagnosis Date    Depression     Nervous breakdown     Renal disorder      Past Surgical History:   Procedure Laterality Date    APPENDECTOMY      TUBAL LIGATION       No family history on file.  Social History   Substance Use Topics    Smoking status: Current Every Day Smoker     Packs/day: 0.50     Types: Cigarettes    Smokeless tobacco: Not on file    Alcohol use Not on file     Review of Systems   Constitutional: Negative for activity change, appetite change, chills and fever.   HENT: Negative for congestion, rhinorrhea and sore throat.    Eyes: Negative for redness and visual disturbance.   Respiratory: Negative for cough, chest  "tightness and shortness of breath.    Cardiovascular: Negative for chest pain.   Gastrointestinal: Positive for nausea. Negative for abdominal pain, diarrhea and vomiting.   Genitourinary: Positive for flank pain. Negative for dysuria and frequency.   Musculoskeletal: Positive for back pain. Negative for neck pain and neck stiffness.   Skin: Negative for rash.   Neurological: Negative for dizziness, syncope, numbness and headaches.   All other systems reviewed and are negative.      Physical Exam     Vitals:    07/02/18 1544   BP: (!) 121/58   Pulse: 73   Resp: 18   Temp: 98.6 °F (37 °C)   TempSrc: Oral   SpO2: (!) 93%   Weight: 63.5 kg (140 lb)   Height: 5' 8" (1.727 m)         Physical Exam    Nursing note and vitals reviewed.  Constitutional: Vital signs are normal. She appears well-developed and well-nourished. She is cooperative.  Non-toxic appearance. She does not have a sickly appearance.   HENT:   Head: Normocephalic and atraumatic.   Right Ear: External ear normal.   Left Ear: External ear normal.   Nose: Nose abnormal.   Mouth/Throat: Oropharynx is clear and moist.   Eyes: Conjunctivae and lids are normal. Pupils are equal, round, and reactive to light.   Neck: Normal range of motion and full passive range of motion without pain. Neck supple.   Cardiovascular: Normal rate, regular rhythm and normal heart sounds. Exam reveals no gallop and no friction rub.    No murmur heard.  Pulmonary/Chest: Breath sounds normal. She has no wheezes. She has no rhonchi. She has no rales.   Abdominal: Soft. Normal appearance. There is tenderness in the left upper quadrant and left lower quadrant. There is no rigidity, no rebound and no CVA tenderness.   Left sided abdominal tenderness to palpation. No rebound or guarding.    Musculoskeletal:   non-reproducible back pain.    Neurological: She is alert and oriented to person, place, and time.   Skin: Skin is warm, dry and intact. No rash noted.         ED Course "   Procedures  Labs Reviewed   CBC W/ AUTO DIFFERENTIAL - Abnormal; Notable for the following:        Result Value    MCH 32.2 (*)     MPV 8.1 (*)     Gran # (ANC) 8.0 (*)     Gran% 79.6 (*)     Lymph% 14.0 (*)     All other components within normal limits   COMPREHENSIVE METABOLIC PANEL - Abnormal; Notable for the following:     Glucose 127 (*)     ALT 9 (*)     eGFR if  43 (*)     eGFR if non  37 (*)     All other components within normal limits   URINALYSIS - Abnormal; Notable for the following:     Occult Blood UA Trace (*)     All other components within normal limits          Imaging Results          CT Abdomen Pelvis  Without Contrast (Final result)  Result time 07/02/18 17:44:56    Final result by Saulo Pardo Jr., MD (07/02/18 17:44:56)                 Impression:      There are two  7 mm intrarenal stones without ureteral stone or hydronephrosis on either side.  Mild diverticulosis coli without CT evidence of diverticulitis.  Stable 3.3 cm infrarenal abdominal aortic aneurysm.  Mild dilation of the common bile duct and mild prominence of the gallbladder is identified but a pancreatic mass or common bile duct stone is not identified by this modality.      Electronically signed by: Saulo Pardo MD  Date:    07/02/2018  Time:    17:44             Narrative:    EXAMINATION:  CT ABDOMEN PELVIS WITHOUT CONTRAST    CLINICAL HISTORY:  Abdominal pain, unspecified;    TECHNIQUE:  Low dose axial images, sagittal and coronal reformations were obtained from the lung bases to the pubic symphysis.  The patient did not receive Oral or IV contrast    COMPARISON:  Prior CT of June 18, 2018.    FINDINGS:  Small plates of atelectasis are seen at both lung bases.  The liver is of normal size and general CT density for a noncontrast study without focal defect identified.  The gallbladder is prominent without CT evidence of stone.  There is mild dilation of the common bile duct to the  head of the pancreas.  A stone or mass in the common duct is not identified by this modality.  The pancreas is of normal contour and CT density without edema or mass.  The spleen is of normal size and CT density without focal defect.    The adrenal glands are not enlarged.  The right kidneys of normal size and noncontrast CT density without mass cyst or hydronephrosis.  The ureter follows a normal course down to the bladder without stone or dilation.  The left kidney is of normal size and general CT density.  Two 7 mm intrarenal stones are identified.  The ureter follows a normal course down to the bladder without dilation or stone.  There is a small infrarenal abdominal aortic aneurysm which reaches a maximum diameter of 3.3 cm.  This is unchanged from the prior study.  No periaortic hemorrhage or fluid collection is seen.  Atherosclerotic calcification is noted in the infrarenal aorta aneurysm and proximal iliac vessels especially on the left with suspected stenosis.    There is mild diverticulosis of the sigmoid and descending colon without CT evidence of diverticulitis.  Bowel dilatation or air-fluid levels are not seen.  No soft tissue masses are noted.  Free fluid or free air is not seen.    In the pelvis the bladder is of normal contour without mass or asymmetry.  The uterus is rather small.  No free fluid is noted.    The bladder is of normal contour without mass or asymmetry.  The uterus is not enlarged.  No other masses or fluid collections are seen.                                 Medical Decision Making:   History:   Old Medical Records: I decided to obtain old medical records.  Clinical Tests:   Lab Tests: Ordered and Reviewed  Radiological Study: Ordered and Reviewed       APC / Resident Notes:   Urgent evaluation of a 73-year-old female who presents with left-sided flank and abdominal pain. She states it is a ripping pain and she is concerned about her aneurysm.  Nausea that has resolved.  She is well  appearing.  Vital signs are stable. She has tenderness the left upper lower quadrant.  No rebound or guarding. No reproducible back tenderness to palpation. Normal pulses noted to bilateral upper and lower extremities. Labs show elevated creatinine, she was given IV fluids in the ER.  CT scan shows stable aneurysm.  No sign of renal stone.  She was given morphine with improvement in her symptoms. She is to follow up with her primary care provider.  She states she took her last 2 Percocet prior coming to the ER.  I looked her up on the [place] and she is noted to have a recent prescription for 20 Percocet.  She can take this in addition to the prescribed muscle relaxer as needed. Discussed results with patient. Return precautions given. Based on my clinical evaluation, I do not appreciate any immediate, emergent, or life threatening condition or etiology that warrants additional workup today and feel that the patient can be discharged with close follow up care.  Patient is to follow up with their primary care provider. Case was discussed with Dr. Alarcon who has evaluated the patient and is in agreement with the plan of care. All questions answered.          Scribe Attestation:   Scribe #1: I performed the above scribed service and the documentation accurately describes the services I performed. I attest to the accuracy of the note.    Attending Attestation:     Physician Attestation Statement for NP/PA:   I have conducted a face to face encounter with this patient in addition to the NP/PA, due to Medical Complexity    Other NP/PA Attestation Additions:    History of Present Illness: 73-year-old female presented with a chief complaint flank/abdominal pain.   Physical Exam: Abdomen soft, nontender, without rebound or guarding noted. Mild left flank tenderness noted.   Medical Decision Making: Initial differential diagnosis included but not limited to ruptured AAA, nephrolithiasis, pyelonephritis, and  musculoskeletal pain.  The patient's labs and CT scan showed no acute abnormalities.  The patient was treated with IV fluids and pain medication in the emergency department, with improvement in her symptoms. The patient could be having musculoskeletal pain. She is stable for discharge to home.  She will be discharged home with p.o. Robaxin and she is to follow up with her PCP for further care.  I am in agreement with the physician assistant's  assessment, treatment, and plan of care.         I, Amina Diallo PA-C, personally performed the services described in this documentation. All medical record entries made by the scribe were at my direction and in my presence.  I have reviewed the chart and agree that the record reflects my personal performance and is accurate and complete. Amina Diallo PA-C.  6:01 PM 07/02/2018             Clinical Impression:   The primary encounter diagnosis was Left flank pain. A diagnosis of Dehydration was also pertinent to this visit.                             Amina Diallo PA-C  07/02/18 1803       Amina Diallo PA-C  07/02/18 1804       Librado Alarcon MD  07/02/18 2018

## 2018-12-19 ENCOUNTER — HOSPITAL ENCOUNTER (EMERGENCY)
Facility: HOSPITAL | Age: 73
Discharge: HOME | End: 2018-12-19
Attending: EMERGENCY MEDICINE
Payer: MEDICARE

## 2018-12-19 VITALS
TEMPERATURE: 99 F | WEIGHT: 140 LBS | SYSTOLIC BLOOD PRESSURE: 125 MMHG | BODY MASS INDEX: 21.22 KG/M2 | OXYGEN SATURATION: 94 % | RESPIRATION RATE: 17 BRPM | HEART RATE: 97 BPM | HEIGHT: 68 IN | DIASTOLIC BLOOD PRESSURE: 58 MMHG

## 2018-12-19 DIAGNOSIS — J20.9 ACUTE BRONCHITIS, UNSPECIFIED ORGANISM: ICD-10-CM

## 2018-12-19 DIAGNOSIS — N39.0 ACUTE UTI: ICD-10-CM

## 2018-12-19 DIAGNOSIS — E86.0 MILD DEHYDRATION: Primary | ICD-10-CM

## 2018-12-19 DIAGNOSIS — R05.9 COUGH: ICD-10-CM

## 2018-12-19 LAB
ALBUMIN SERPL BCP-MCNC: 3.2 G/DL
ALP SERPL-CCNC: 65 U/L
ALT SERPL W/O P-5'-P-CCNC: 10 U/L
ANION GAP SERPL CALC-SCNC: 12 MMOL/L
AST SERPL-CCNC: 15 U/L
BASOPHILS # BLD AUTO: 0.1 K/UL
BASOPHILS NFR BLD: 1 %
BILIRUB SERPL-MCNC: 0.2 MG/DL
BILIRUB UR QL STRIP: NEGATIVE
BUN SERPL-MCNC: 22 MG/DL
CALCIUM SERPL-MCNC: 10.1 MG/DL
CHLORIDE SERPL-SCNC: 102 MMOL/L
CLARITY UR: CLEAR
CO2 SERPL-SCNC: 27 MMOL/L
COLOR UR: YELLOW
CREAT SERPL-MCNC: 1.5 MG/DL
DIFFERENTIAL METHOD: ABNORMAL
EOSINOPHIL # BLD AUTO: 0.2 K/UL
EOSINOPHIL NFR BLD: 1.8 %
ERYTHROCYTE [DISTWIDTH] IN BLOOD BY AUTOMATED COUNT: 13.2 %
EST. GFR  (AFRICAN AMERICAN): 40 ML/MIN/1.73 M^2
EST. GFR  (NON AFRICAN AMERICAN): 34 ML/MIN/1.73 M^2
FLUAV AG SPEC QL IA: NEGATIVE
FLUBV AG SPEC QL IA: NEGATIVE
GLUCOSE SERPL-MCNC: 133 MG/DL
GLUCOSE UR QL STRIP: NEGATIVE
HCT VFR BLD AUTO: 38.4 %
HGB BLD-MCNC: 12.9 G/DL
HGB UR QL STRIP: ABNORMAL
HYALINE CASTS #/AREA URNS LPF: 2 /LPF
KETONES UR QL STRIP: ABNORMAL
LACTATE SERPL-SCNC: 1.2 MMOL/L
LEUKOCYTE ESTERASE UR QL STRIP: ABNORMAL
LYMPHOCYTES # BLD AUTO: 2.5 K/UL
LYMPHOCYTES NFR BLD: 27 %
MCH RBC QN AUTO: 31.1 PG
MCHC RBC AUTO-ENTMCNC: 33.6 G/DL
MCV RBC AUTO: 92 FL
MICROSCOPIC COMMENT: ABNORMAL
MONOCYTES # BLD AUTO: 0.9 K/UL
MONOCYTES NFR BLD: 9.5 %
NEUTROPHILS # BLD AUTO: 5.7 K/UL
NEUTROPHILS NFR BLD: 60.7 %
NITRITE UR QL STRIP: NEGATIVE
PH UR STRIP: 6 [PH] (ref 5–8)
PLATELET # BLD AUTO: 272 K/UL
PMV BLD AUTO: 7.9 FL
POTASSIUM SERPL-SCNC: 4 MMOL/L
PROT SERPL-MCNC: 7.8 G/DL
PROT UR QL STRIP: NEGATIVE
RBC # BLD AUTO: 4.15 M/UL
RBC #/AREA URNS HPF: 2 /HPF (ref 0–4)
SODIUM SERPL-SCNC: 141 MMOL/L
SP GR UR STRIP: 1.02 (ref 1–1.03)
SPECIMEN SOURCE: NORMAL
SQUAMOUS #/AREA URNS HPF: 5 /HPF
URN SPEC COLLECT METH UR: ABNORMAL
UROBILINOGEN UR STRIP-ACNC: NEGATIVE EU/DL
WBC # BLD AUTO: 9.4 K/UL
WBC #/AREA URNS HPF: 20 /HPF (ref 0–5)
WBC CLUMPS URNS QL MICRO: ABNORMAL

## 2018-12-19 PROCEDURE — 96361 HYDRATE IV INFUSION ADD-ON: CPT

## 2018-12-19 PROCEDURE — 25000242 PHARM REV CODE 250 ALT 637 W/ HCPCS: Performed by: EMERGENCY MEDICINE

## 2018-12-19 PROCEDURE — 80053 COMPREHEN METABOLIC PANEL: CPT

## 2018-12-19 PROCEDURE — 94640 AIRWAY INHALATION TREATMENT: CPT

## 2018-12-19 PROCEDURE — 81000 URINALYSIS NONAUTO W/SCOPE: CPT

## 2018-12-19 PROCEDURE — 87186 SC STD MICRODIL/AGAR DIL: CPT

## 2018-12-19 PROCEDURE — 36415 COLL VENOUS BLD VENIPUNCTURE: CPT

## 2018-12-19 PROCEDURE — 96365 THER/PROPH/DIAG IV INF INIT: CPT

## 2018-12-19 PROCEDURE — 83605 ASSAY OF LACTIC ACID: CPT

## 2018-12-19 PROCEDURE — 87400 INFLUENZA A/B EACH AG IA: CPT

## 2018-12-19 PROCEDURE — 87077 CULTURE AEROBIC IDENTIFY: CPT

## 2018-12-19 PROCEDURE — 63600175 PHARM REV CODE 636 W HCPCS: Performed by: EMERGENCY MEDICINE

## 2018-12-19 PROCEDURE — 87088 URINE BACTERIA CULTURE: CPT

## 2018-12-19 PROCEDURE — 25000003 PHARM REV CODE 250: Performed by: EMERGENCY MEDICINE

## 2018-12-19 PROCEDURE — 99284 EMERGENCY DEPT VISIT MOD MDM: CPT | Mod: 25

## 2018-12-19 PROCEDURE — 87086 URINE CULTURE/COLONY COUNT: CPT

## 2018-12-19 PROCEDURE — 96375 TX/PRO/DX INJ NEW DRUG ADDON: CPT

## 2018-12-19 PROCEDURE — 85025 COMPLETE CBC W/AUTO DIFF WBC: CPT

## 2018-12-19 RX ORDER — METHYLPREDNISOLONE SOD SUCC 125 MG
125 VIAL (EA) INJECTION
Status: COMPLETED | OUTPATIENT
Start: 2018-12-19 | End: 2018-12-19

## 2018-12-19 RX ORDER — GUAIFENESIN 600 MG/1
1200 TABLET, EXTENDED RELEASE ORAL 2 TIMES DAILY
Qty: 28 TABLET | Refills: 0 | Status: SHIPPED | OUTPATIENT
Start: 2018-12-19 | End: 2018-12-26

## 2018-12-19 RX ORDER — CEPHALEXIN 500 MG/1
500 CAPSULE ORAL EVERY 8 HOURS
Qty: 18 CAPSULE | Refills: 0 | Status: SHIPPED | OUTPATIENT
Start: 2018-12-19 | End: 2018-12-25

## 2018-12-19 RX ORDER — ALBUTEROL SULFATE 90 UG/1
1-2 AEROSOL, METERED RESPIRATORY (INHALATION) EVERY 6 HOURS PRN
Qty: 1 INHALER | Refills: 0 | Status: SHIPPED | OUTPATIENT
Start: 2018-12-19 | End: 2019-07-19 | Stop reason: CLARIF

## 2018-12-19 RX ORDER — IPRATROPIUM BROMIDE AND ALBUTEROL SULFATE 2.5; .5 MG/3ML; MG/3ML
3 SOLUTION RESPIRATORY (INHALATION)
Status: COMPLETED | OUTPATIENT
Start: 2018-12-19 | End: 2018-12-19

## 2018-12-19 RX ORDER — PREGABALIN 300 MG/1
300 CAPSULE ORAL 2 TIMES DAILY
Status: ON HOLD | COMMUNITY
End: 2019-04-11 | Stop reason: HOSPADM

## 2018-12-19 RX ORDER — BENZONATATE 100 MG/1
100 CAPSULE ORAL 3 TIMES DAILY PRN
Qty: 20 CAPSULE | Refills: 0 | Status: SHIPPED | OUTPATIENT
Start: 2018-12-19 | End: 2018-12-29

## 2018-12-19 RX ORDER — KETOROLAC TROMETHAMINE 30 MG/ML
15 INJECTION, SOLUTION INTRAMUSCULAR; INTRAVENOUS
Status: COMPLETED | OUTPATIENT
Start: 2018-12-19 | End: 2018-12-19

## 2018-12-19 RX ADMIN — SODIUM CHLORIDE 1000 ML: 0.9 INJECTION, SOLUTION INTRAVENOUS at 09:12

## 2018-12-19 RX ADMIN — KETOROLAC TROMETHAMINE 15 MG: 30 INJECTION, SOLUTION INTRAMUSCULAR at 09:12

## 2018-12-19 RX ADMIN — METHYLPREDNISOLONE SODIUM SUCCINATE 125 MG: 125 INJECTION, POWDER, FOR SOLUTION INTRAMUSCULAR; INTRAVENOUS at 09:12

## 2018-12-19 RX ADMIN — IPRATROPIUM BROMIDE AND ALBUTEROL SULFATE 3 ML: .5; 3 SOLUTION RESPIRATORY (INHALATION) at 09:12

## 2018-12-19 RX ADMIN — CEFTRIAXONE 1 G: 1 INJECTION, SOLUTION INTRAVENOUS at 10:12

## 2018-12-20 NOTE — ED PROVIDER NOTES
Encounter Date: 12/19/2018    SCRIBE #1 NOTE: I, Bonny Mendoza, am scribing for, and in the presence of, Dr. Marco Suárez.       History     Chief Complaint   Patient presents with    Cough     weakness x 2 weeks       Time seen by provider: 8:54 PM on 12/19/2018    Queta Ocampo is a 73 y.o. female who presents to the ED with an onset of intermittent cough that has been present for 2 weeks. She also complains of weakness that has been present for one week. Pt has not seen her PCP or taken any medication to help with her cough. She endorses an appetite change and fatigue. Pt ate a roast beef sandwich for dinner ~6 hours ago. The patient denies burning urination, increased frequency, or any other symptoms at this time. No pertinent PMHx or PSHx noted. She denies having a Hx of a UTI. Pt denies smoking tobacco. Pt is allergic to Codeine and Pcn.      The history is provided by the patient.     Review of patient's allergies indicates:   Allergen Reactions    Codeine Itching, Swelling, Rash, Other (See Comments) and Nausea And Vomiting     Facial swelling, itching, rash, erythema    Pcn [penicillins] Itching, Swelling, Rash and Other (See Comments)     Facial swelling with rash, erythema, itching     Past Medical History:   Diagnosis Date    Depression     Nervous breakdown     Renal disorder      Past Surgical History:   Procedure Laterality Date    APPENDECTOMY      TUBAL LIGATION       History reviewed. No pertinent family history.  Social History     Tobacco Use    Smoking status: Current Every Day Smoker     Packs/day: 0.50     Types: Cigarettes   Substance Use Topics    Alcohol use: Not on file    Drug use: Not on file     Review of Systems   Constitutional: Positive for appetite change and fatigue. Negative for fever.   HENT: Negative for congestion.    Eyes: Negative for visual disturbance.   Respiratory: Positive for cough. Negative for wheezing.    Cardiovascular: Negative for chest pain.    Gastrointestinal: Negative for abdominal pain.   Genitourinary: Negative for dysuria and frequency.   Musculoskeletal: Negative for joint swelling.   Skin: Negative for rash.   Neurological: Positive for weakness. Negative for syncope.   Hematological: Does not bruise/bleed easily.   Psychiatric/Behavioral: Negative for confusion.       Physical Exam     Initial Vitals [12/19/18 1805]   BP Pulse Resp Temp SpO2   133/63 103 16 98.9 °F (37.2 °C) 96 %      MAP       --         Physical Exam    Nursing note and vitals reviewed.  Constitutional: She appears well-nourished.   HENT:   Head: Normocephalic and atraumatic.   Eyes: Conjunctivae and EOM are normal.   Neck: Normal range of motion. Neck supple. No thyroid mass present.   Cardiovascular: Normal rate, regular rhythm and normal heart sounds. Exam reveals no gallop and no friction rub.    No murmur heard.  Pulmonary/Chest: Breath sounds normal. She has no wheezes. She has no rhonchi. She has no rales.   Abdominal: Soft. Normal appearance and bowel sounds are normal. There is no tenderness.   Neurological: She is alert and oriented to person, place, and time. She has normal strength. No cranial nerve deficit or sensory deficit.   Skin: Skin is warm and dry. No rash noted. No erythema.   Psychiatric: She has a normal mood and affect. Her speech is normal. Cognition and memory are normal.         ED Course   Procedures  Labs Reviewed   CBC W/ AUTO DIFFERENTIAL - Abnormal; Notable for the following components:       Result Value    MCH 31.1 (*)     MPV 7.9 (*)     All other components within normal limits   COMPREHENSIVE METABOLIC PANEL - Abnormal; Notable for the following components:    Glucose 133 (*)     Creatinine 1.5 (*)     Albumin 3.2 (*)     eGFR if  40 (*)     eGFR if non  34 (*)     All other components within normal limits   URINALYSIS, REFLEX TO URINE CULTURE - Abnormal; Notable for the following components:    Ketones, UA  Trace (*)     Occult Blood UA Trace (*)     Leukocytes, UA 3+ (*)     All other components within normal limits    Narrative:     Preferred Collection Type->Urine, Clean Catch   URINALYSIS MICROSCOPIC - Abnormal; Notable for the following components:    WBC, UA 20 (*)     Hyaline Casts, UA 2 (*)     All other components within normal limits    Narrative:     Preferred Collection Type->Urine, Clean Catch   CULTURE, URINE   LACTIC ACID, PLASMA   INFLUENZA A AND B ANTIGEN          Imaging Results          X-Ray Chest PA And Lateral (In process)                  Medical Decision Making:   History:   Old Medical Records: I decided to obtain old medical records.  Clinical Tests:   Lab Tests: Ordered and Reviewed  Radiological Study: Ordered and Reviewed  ED Management:  The patient appears to have a viral upper respiratory infection inducing acute bronchitis.  Based upon the history and physical exam the patient does not appear to have a serious bacterial infection or occult life-threatening pathology including such as lobar pneumonia, sepsis, bacterial sinusitis, strep pharyngitis, parapharyngeal or peritonsillar abscess, meningitis, PE, atypical ACS, acute CHF exacerbation.  Labs found to be negative for evidence of progressing severe infection or inflammation.  Baseline renal function is maintained.  Patient had improvement with IV steroids, hydration and a breathing treatment here.  Will be discharged multiple medications for symptomatic improvement at home.  She reports history of urinary tract infections and urinalysis does indicate some evidence of infection.  Patient will be initiated on antibiotics for this.  She is asked to follow up with her primary care doctor asap to assess for expected improvement.  She is asked to return to the ER for any new, concerning, or worsening symptoms, including difficulty breathing, worsening purulence of sputum.  Patient is agreeable with this plan for follow-up and she was  discharged in stable condition.              Scribe Attestation:   Scribe #1: I performed the above scribed service and the documentation accurately describes the services I performed. I attest to the accuracy of the note.    I, Dr. Marco Suárez, personally performed the services described in this documentation. All medical record entries made by the scribe were at my direction and in my presence.  I have reviewed the chart and agree that the record reflects my personal performance and is accurate and complete. Marco Suárez MD.  5:47 AM 12/20/2018             Clinical Impression:   The primary encounter diagnosis was Mild dehydration. Diagnoses of Cough, Acute UTI, and Acute bronchitis, unspecified organism were also pertinent to this visit.      Disposition:   Disposition: Discharged  Condition: Stable                        Marco Suárez MD  12/20/18 0501

## 2018-12-20 NOTE — ED NOTES
Pt in room 7 for evaluation of cough.  Pt is awake, alert and oriented. Resp even and unlabored. Skin warm and dry. Abd soft and non-tender. Pt denies chest pain. Zak breath sounds clear. Non-productive cough noted.

## 2018-12-20 NOTE — PROGRESS NOTES
I have evaluated and performed a medical screening assessment on this patient while awaiting a thorough evaluation and treatment. All of the emergency department beds are occupied at this time. When appropriate, laboratory studies will be ordered from triage. The patient has been advised of this process and care plan. Patient complains of cough and weakness for the last two weeks.  No fever.      Orders pending:  CBC, CMP, Lactic Acid, Chest xray   Disposition: stable

## 2018-12-22 LAB — BACTERIA UR CULT: NORMAL

## 2019-04-08 ENCOUNTER — HOSPITAL ENCOUNTER (OUTPATIENT)
Facility: HOSPITAL | Age: 74
Discharge: HOME HEALTH | End: 2019-04-11
Attending: EMERGENCY MEDICINE | Admitting: INTERNAL MEDICINE
Payer: MEDICARE

## 2019-04-08 DIAGNOSIS — R42 POSTURAL DIZZINESS WITH PRESYNCOPE: ICD-10-CM

## 2019-04-08 DIAGNOSIS — R42 ORTHOSTATIC DIZZINESS: Primary | ICD-10-CM

## 2019-04-08 DIAGNOSIS — F32.A DEPRESSION, UNSPECIFIED DEPRESSION TYPE: ICD-10-CM

## 2019-04-08 DIAGNOSIS — G60.9 IDIOPATHIC PERIPHERAL NEUROPATHY: ICD-10-CM

## 2019-04-08 DIAGNOSIS — N39.0 URINARY TRACT INFECTION WITHOUT HEMATURIA, SITE UNSPECIFIED: ICD-10-CM

## 2019-04-08 DIAGNOSIS — E43 SEVERE MALNUTRITION: ICD-10-CM

## 2019-04-08 DIAGNOSIS — R55 POSTURAL DIZZINESS WITH PRESYNCOPE: ICD-10-CM

## 2019-04-08 LAB
ALBUMIN SERPL BCP-MCNC: 4 G/DL (ref 3.5–5.2)
ALP SERPL-CCNC: 52 U/L (ref 55–135)
ALT SERPL W/O P-5'-P-CCNC: 11 U/L (ref 10–44)
AMPHET+METHAMPHET UR QL: NEGATIVE
ANION GAP SERPL CALC-SCNC: 9 MMOL/L (ref 8–16)
AST SERPL-CCNC: 15 U/L (ref 10–40)
BARBITURATES UR QL SCN>200 NG/ML: NEGATIVE
BASOPHILS # BLD AUTO: 0 K/UL (ref 0–0.2)
BASOPHILS NFR BLD: 0.6 % (ref 0–1.9)
BENZODIAZ UR QL SCN>200 NG/ML: NORMAL
BILIRUB SERPL-MCNC: 0.4 MG/DL (ref 0.1–1)
BILIRUB UR QL STRIP: NEGATIVE
BUN SERPL-MCNC: 16 MG/DL (ref 8–23)
BZE UR QL SCN: NEGATIVE
CALCIUM SERPL-MCNC: 10.6 MG/DL (ref 8.7–10.5)
CANNABINOIDS UR QL SCN: NEGATIVE
CHLORIDE SERPL-SCNC: 100 MMOL/L (ref 95–110)
CK SERPL-CCNC: 25 U/L (ref 20–180)
CLARITY UR: CLEAR
CO2 SERPL-SCNC: 28 MMOL/L (ref 23–29)
COLOR UR: YELLOW
CREAT SERPL-MCNC: 1 MG/DL (ref 0.5–1.4)
CREAT UR-MCNC: 167.5 MG/DL (ref 15–325)
DIFFERENTIAL METHOD: ABNORMAL
EOSINOPHIL # BLD AUTO: 0.2 K/UL (ref 0–0.5)
EOSINOPHIL NFR BLD: 2.4 % (ref 0–8)
ERYTHROCYTE [DISTWIDTH] IN BLOOD BY AUTOMATED COUNT: 14.5 % (ref 11.5–14.5)
EST. GFR  (AFRICAN AMERICAN): >60 ML/MIN/1.73 M^2
EST. GFR  (NON AFRICAN AMERICAN): 56 ML/MIN/1.73 M^2
ETHANOL SERPL-MCNC: <10 MG/DL
GLUCOSE SERPL-MCNC: 129 MG/DL (ref 70–110)
GLUCOSE UR QL STRIP: NEGATIVE
HCT VFR BLD AUTO: 41.7 % (ref 37–48.5)
HGB BLD-MCNC: 13.8 G/DL (ref 12–16)
HGB UR QL STRIP: ABNORMAL
INR PPP: 1 (ref 0.8–1.2)
KETONES UR QL STRIP: NEGATIVE
LEUKOCYTE ESTERASE UR QL STRIP: ABNORMAL
LYMPHOCYTES # BLD AUTO: 2.5 K/UL (ref 1–4.8)
LYMPHOCYTES NFR BLD: 30.3 % (ref 18–48)
MAGNESIUM SERPL-MCNC: 2 MG/DL (ref 1.6–2.6)
MCH RBC QN AUTO: 30.1 PG (ref 27–31)
MCHC RBC AUTO-ENTMCNC: 33 G/DL (ref 32–36)
MCV RBC AUTO: 91 FL (ref 82–98)
METHADONE UR QL SCN>300 NG/ML: NEGATIVE
MICROSCOPIC COMMENT: ABNORMAL
MONOCYTES # BLD AUTO: 0.7 K/UL (ref 0.3–1)
MONOCYTES NFR BLD: 8.5 % (ref 4–15)
NEUTROPHILS # BLD AUTO: 4.7 K/UL (ref 1.8–7.7)
NEUTROPHILS NFR BLD: 58.2 % (ref 38–73)
NITRITE UR QL STRIP: NEGATIVE
OPIATES UR QL SCN: NORMAL
PCP UR QL SCN>25 NG/ML: NEGATIVE
PH UR STRIP: 6 [PH] (ref 5–8)
PLATELET # BLD AUTO: 211 K/UL (ref 150–350)
PMV BLD AUTO: 8.8 FL (ref 9.2–12.9)
POTASSIUM SERPL-SCNC: 3.9 MMOL/L (ref 3.5–5.1)
PROT SERPL-MCNC: 7.7 G/DL (ref 6–8.4)
PROT UR QL STRIP: ABNORMAL
PROTHROMBIN TIME: 10.4 SEC (ref 9–12.5)
RBC # BLD AUTO: 4.57 M/UL (ref 4–5.4)
RBC #/AREA URNS HPF: 2 /HPF (ref 0–4)
SODIUM SERPL-SCNC: 137 MMOL/L (ref 136–145)
SP GR UR STRIP: 1.02 (ref 1–1.03)
TOXICOLOGY INFORMATION: NORMAL
TROPONIN I SERPL DL<=0.01 NG/ML-MCNC: 0.01 NG/ML (ref 0–0.03)
TSH SERPL DL<=0.005 MIU/L-ACNC: 1.38 UIU/ML (ref 0.4–4)
URN SPEC COLLECT METH UR: ABNORMAL
UROBILINOGEN UR STRIP-ACNC: NEGATIVE EU/DL
WBC # BLD AUTO: 8.1 K/UL (ref 3.9–12.7)
WBC #/AREA URNS HPF: 9 /HPF (ref 0–5)
WBC CLUMPS URNS QL MICRO: ABNORMAL

## 2019-04-08 PROCEDURE — 82550 ASSAY OF CK (CPK): CPT

## 2019-04-08 PROCEDURE — 93005 ELECTROCARDIOGRAM TRACING: CPT

## 2019-04-08 PROCEDURE — 85610 PROTHROMBIN TIME: CPT

## 2019-04-08 PROCEDURE — 80307 DRUG TEST PRSMV CHEM ANLYZR: CPT

## 2019-04-08 PROCEDURE — 84443 ASSAY THYROID STIM HORMONE: CPT

## 2019-04-08 PROCEDURE — 85025 COMPLETE CBC W/AUTO DIFF WBC: CPT

## 2019-04-08 PROCEDURE — 80053 COMPREHEN METABOLIC PANEL: CPT

## 2019-04-08 PROCEDURE — G0378 HOSPITAL OBSERVATION PER HR: HCPCS

## 2019-04-08 PROCEDURE — 36415 COLL VENOUS BLD VENIPUNCTURE: CPT

## 2019-04-08 PROCEDURE — 80320 DRUG SCREEN QUANTALCOHOLS: CPT

## 2019-04-08 PROCEDURE — 84484 ASSAY OF TROPONIN QUANT: CPT

## 2019-04-08 PROCEDURE — 83735 ASSAY OF MAGNESIUM: CPT

## 2019-04-08 PROCEDURE — 81000 URINALYSIS NONAUTO W/SCOPE: CPT | Mod: 59

## 2019-04-08 PROCEDURE — 99285 EMERGENCY DEPT VISIT HI MDM: CPT | Mod: 25

## 2019-04-09 PROBLEM — F32.A DEPRESSION: Status: ACTIVE | Noted: 2019-04-09

## 2019-04-09 PROBLEM — G62.9 PERIPHERAL NEUROPATHY: Status: ACTIVE | Noted: 2019-04-09

## 2019-04-09 LAB
ALBUMIN SERPL BCP-MCNC: 3.6 G/DL (ref 3.5–5.2)
ALP SERPL-CCNC: 48 U/L (ref 55–135)
ALT SERPL W/O P-5'-P-CCNC: 9 U/L (ref 10–44)
ANION GAP SERPL CALC-SCNC: 8 MMOL/L (ref 8–16)
AST SERPL-CCNC: 15 U/L (ref 10–40)
BASOPHILS # BLD AUTO: 0 K/UL (ref 0–0.2)
BASOPHILS NFR BLD: 0.6 % (ref 0–1.9)
BILIRUB SERPL-MCNC: 0.5 MG/DL (ref 0.1–1)
BUN SERPL-MCNC: 14 MG/DL (ref 8–23)
CALCIUM SERPL-MCNC: 9.9 MG/DL (ref 8.7–10.5)
CHLORIDE SERPL-SCNC: 102 MMOL/L (ref 95–110)
CO2 SERPL-SCNC: 28 MMOL/L (ref 23–29)
CREAT SERPL-MCNC: 0.9 MG/DL (ref 0.5–1.4)
DIFFERENTIAL METHOD: ABNORMAL
EOSINOPHIL # BLD AUTO: 0.2 K/UL (ref 0–0.5)
EOSINOPHIL NFR BLD: 3.3 % (ref 0–8)
ERYTHROCYTE [DISTWIDTH] IN BLOOD BY AUTOMATED COUNT: 14 % (ref 11.5–14.5)
EST. GFR  (AFRICAN AMERICAN): >60 ML/MIN/1.73 M^2
EST. GFR  (NON AFRICAN AMERICAN): >60 ML/MIN/1.73 M^2
ESTIMATED AVG GLUCOSE: 128 MG/DL (ref 68–131)
FOLATE SERPL-MCNC: 17.6 NG/ML (ref 4–24)
GLUCOSE SERPL-MCNC: 111 MG/DL (ref 70–110)
HBA1C MFR BLD HPLC: 6.1 % (ref 4–5.6)
HCT VFR BLD AUTO: 37.7 % (ref 37–48.5)
HGB BLD-MCNC: 12.6 G/DL (ref 12–16)
LYMPHOCYTES # BLD AUTO: 2.5 K/UL (ref 1–4.8)
LYMPHOCYTES NFR BLD: 33.5 % (ref 18–48)
MAGNESIUM SERPL-MCNC: 1.8 MG/DL (ref 1.6–2.6)
MCH RBC QN AUTO: 30.1 PG (ref 27–31)
MCHC RBC AUTO-ENTMCNC: 33.3 G/DL (ref 32–36)
MCV RBC AUTO: 91 FL (ref 82–98)
MONOCYTES # BLD AUTO: 0.6 K/UL (ref 0.3–1)
MONOCYTES NFR BLD: 8.8 % (ref 4–15)
NEUTROPHILS # BLD AUTO: 4 K/UL (ref 1.8–7.7)
NEUTROPHILS NFR BLD: 53.8 % (ref 38–73)
PHOSPHATE SERPL-MCNC: 3 MG/DL (ref 2.7–4.5)
PLATELET # BLD AUTO: 193 K/UL (ref 150–350)
PMV BLD AUTO: 8.4 FL (ref 9.2–12.9)
POCT GLUCOSE: 112 MG/DL (ref 70–110)
POCT GLUCOSE: 138 MG/DL (ref 70–110)
POCT GLUCOSE: 97 MG/DL (ref 70–110)
POTASSIUM SERPL-SCNC: 3.7 MMOL/L (ref 3.5–5.1)
PROT SERPL-MCNC: 7 G/DL (ref 6–8.4)
RBC # BLD AUTO: 4.17 M/UL (ref 4–5.4)
SODIUM SERPL-SCNC: 138 MMOL/L (ref 136–145)
VIT B12 SERPL-MCNC: 760 PG/ML (ref 210–950)
WBC # BLD AUTO: 7.4 K/UL (ref 3.9–12.7)

## 2019-04-09 PROCEDURE — 82533 TOTAL CORTISOL: CPT

## 2019-04-09 PROCEDURE — 63600175 PHARM REV CODE 636 W HCPCS: Performed by: INTERNAL MEDICINE

## 2019-04-09 PROCEDURE — 84100 ASSAY OF PHOSPHORUS: CPT

## 2019-04-09 PROCEDURE — 96365 THER/PROPH/DIAG IV INF INIT: CPT

## 2019-04-09 PROCEDURE — G0378 HOSPITAL OBSERVATION PER HR: HCPCS

## 2019-04-09 PROCEDURE — 83036 HEMOGLOBIN GLYCOSYLATED A1C: CPT

## 2019-04-09 PROCEDURE — 25000242 PHARM REV CODE 250 ALT 637 W/ HCPCS: Performed by: INTERNAL MEDICINE

## 2019-04-09 PROCEDURE — 82607 VITAMIN B-12: CPT

## 2019-04-09 PROCEDURE — 25000003 PHARM REV CODE 250: Performed by: INTERNAL MEDICINE

## 2019-04-09 PROCEDURE — 94761 N-INVAS EAR/PLS OXIMETRY MLT: CPT

## 2019-04-09 PROCEDURE — 84425 ASSAY OF VITAMIN B-1: CPT

## 2019-04-09 PROCEDURE — 80053 COMPREHEN METABOLIC PANEL: CPT

## 2019-04-09 PROCEDURE — 82746 ASSAY OF FOLIC ACID SERUM: CPT

## 2019-04-09 PROCEDURE — 36415 COLL VENOUS BLD VENIPUNCTURE: CPT

## 2019-04-09 PROCEDURE — 85025 COMPLETE CBC W/AUTO DIFF WBC: CPT

## 2019-04-09 PROCEDURE — 83735 ASSAY OF MAGNESIUM: CPT

## 2019-04-09 PROCEDURE — 25000003 PHARM REV CODE 250: Performed by: NURSE PRACTITIONER

## 2019-04-09 RX ORDER — FLUTICASONE PROPIONATE 50 MCG
2 SPRAY, SUSPENSION (ML) NASAL DAILY
Status: DISCONTINUED | OUTPATIENT
Start: 2019-04-09 | End: 2019-04-11 | Stop reason: HOSPADM

## 2019-04-09 RX ORDER — LANOLIN ALCOHOL/MO/W.PET/CERES
800 CREAM (GRAM) TOPICAL
Status: DISCONTINUED | OUTPATIENT
Start: 2019-04-09 | End: 2019-04-11 | Stop reason: HOSPADM

## 2019-04-09 RX ORDER — PREGABALIN 75 MG/1
150 CAPSULE ORAL 2 TIMES DAILY
Status: DISCONTINUED | OUTPATIENT
Start: 2019-04-09 | End: 2019-04-11 | Stop reason: HOSPADM

## 2019-04-09 RX ORDER — ENOXAPARIN SODIUM 100 MG/ML
40 INJECTION SUBCUTANEOUS EVERY 24 HOURS
Status: DISCONTINUED | OUTPATIENT
Start: 2019-04-09 | End: 2019-04-11 | Stop reason: HOSPADM

## 2019-04-09 RX ORDER — BUSPIRONE HYDROCHLORIDE 10 MG/1
10 TABLET ORAL 3 TIMES DAILY
COMMUNITY
End: 2019-07-19 | Stop reason: CLARIF

## 2019-04-09 RX ORDER — ONDANSETRON 2 MG/ML
8 INJECTION INTRAMUSCULAR; INTRAVENOUS EVERY 8 HOURS PRN
Status: DISCONTINUED | OUTPATIENT
Start: 2019-04-09 | End: 2019-04-11 | Stop reason: HOSPADM

## 2019-04-09 RX ORDER — POTASSIUM CHLORIDE 20 MEQ/15ML
40 SOLUTION ORAL
Status: DISCONTINUED | OUTPATIENT
Start: 2019-04-09 | End: 2019-04-11 | Stop reason: HOSPADM

## 2019-04-09 RX ORDER — IBUPROFEN 200 MG
1 TABLET ORAL DAILY
Status: DISCONTINUED | OUTPATIENT
Start: 2019-04-10 | End: 2019-04-11 | Stop reason: HOSPADM

## 2019-04-09 RX ORDER — DULOXETIN HYDROCHLORIDE 30 MG/1
30 CAPSULE, DELAYED RELEASE ORAL DAILY
Status: DISCONTINUED | OUTPATIENT
Start: 2019-04-09 | End: 2019-04-11 | Stop reason: HOSPADM

## 2019-04-09 RX ORDER — AMOXICILLIN 250 MG
1 CAPSULE ORAL 2 TIMES DAILY PRN
Status: DISCONTINUED | OUTPATIENT
Start: 2019-04-09 | End: 2019-04-11 | Stop reason: HOSPADM

## 2019-04-09 RX ORDER — SODIUM CHLORIDE 0.9 % (FLUSH) 0.9 %
10 SYRINGE (ML) INJECTION
Status: DISCONTINUED | OUTPATIENT
Start: 2019-04-09 | End: 2019-04-11 | Stop reason: HOSPADM

## 2019-04-09 RX ORDER — ACETAMINOPHEN 500 MG
1000 TABLET ORAL EVERY 6 HOURS PRN
Status: DISCONTINUED | OUTPATIENT
Start: 2019-04-09 | End: 2019-04-11 | Stop reason: HOSPADM

## 2019-04-09 RX ORDER — PANTOPRAZOLE SODIUM 40 MG/1
40 TABLET, DELAYED RELEASE ORAL DAILY
Status: DISCONTINUED | OUTPATIENT
Start: 2019-04-09 | End: 2019-04-11 | Stop reason: HOSPADM

## 2019-04-09 RX ORDER — GLUCAGON 1 MG
1 KIT INJECTION
Status: DISCONTINUED | OUTPATIENT
Start: 2019-04-09 | End: 2019-04-11 | Stop reason: HOSPADM

## 2019-04-09 RX ORDER — CIPROFLOXACIN 2 MG/ML
400 INJECTION, SOLUTION INTRAVENOUS
Status: DISCONTINUED | OUTPATIENT
Start: 2019-04-09 | End: 2019-04-11 | Stop reason: HOSPADM

## 2019-04-09 RX ORDER — IBUPROFEN 200 MG
24 TABLET ORAL
Status: DISCONTINUED | OUTPATIENT
Start: 2019-04-09 | End: 2019-04-11 | Stop reason: HOSPADM

## 2019-04-09 RX ORDER — POTASSIUM CHLORIDE 20 MEQ/15ML
60 SOLUTION ORAL
Status: DISCONTINUED | OUTPATIENT
Start: 2019-04-09 | End: 2019-04-11 | Stop reason: HOSPADM

## 2019-04-09 RX ORDER — IBUPROFEN 200 MG
16 TABLET ORAL
Status: DISCONTINUED | OUTPATIENT
Start: 2019-04-09 | End: 2019-04-11 | Stop reason: HOSPADM

## 2019-04-09 RX ORDER — SODIUM CHLORIDE 9 MG/ML
INJECTION, SOLUTION INTRAVENOUS CONTINUOUS
Status: DISCONTINUED | OUTPATIENT
Start: 2019-04-09 | End: 2019-04-11 | Stop reason: HOSPADM

## 2019-04-09 RX ADMIN — SODIUM CHLORIDE: 0.9 INJECTION, SOLUTION INTRAVENOUS at 08:04

## 2019-04-09 RX ADMIN — SODIUM CHLORIDE: 0.9 INJECTION, SOLUTION INTRAVENOUS at 01:04

## 2019-04-09 RX ADMIN — PREGABALIN 150 MG: 75 CAPSULE ORAL at 03:04

## 2019-04-09 RX ADMIN — ACETAMINOPHEN 1000 MG: 500 TABLET ORAL at 05:04

## 2019-04-09 RX ADMIN — ACETAMINOPHEN 1000 MG: 500 TABLET ORAL at 12:04

## 2019-04-09 RX ADMIN — FLUTICASONE PROPIONATE 100 MCG: 50 SPRAY, METERED NASAL at 02:04

## 2019-04-09 RX ADMIN — PANTOPRAZOLE SODIUM 40 MG: 40 TABLET, DELAYED RELEASE ORAL at 08:04

## 2019-04-09 RX ADMIN — DULOXETINE 30 MG: 30 CAPSULE, DELAYED RELEASE ORAL at 08:04

## 2019-04-09 RX ADMIN — CIPROFLOXACIN 400 MG: 2 INJECTION, SOLUTION INTRAVENOUS at 11:04

## 2019-04-09 NOTE — PLAN OF CARE
Problem: Adult Inpatient Plan of Care  Goal: Plan of Care Review  Outcome: Ongoing (interventions implemented as appropriate)     04/09/19 6904   Plan of Care Review   Plan of Care Reviewed With patient;family   Progress improving   Outcome Summary Avasys on pt   Pt medicated for pain as needed. NAD noted. POC reviewed w pt and they verbalized understanding. Tele- SR    Pt free from falls, Pt ambulates to the bathroom. Bed in low position, side rails up x 2. Call light in reach, bed alarm on and wheels locked. Will continue to monitor.

## 2019-04-09 NOTE — ASSESSMENT & PLAN NOTE
Patient orthostatics positive in the ED.  CT of the head was negative for acute process.  Will provide IV hydration.  Continuous cardiac monitoring.  Check echocardiogram and carotid ultrasound.  Hold benzos and avoid opiates for now.

## 2019-04-09 NOTE — ASSESSMENT & PLAN NOTE
Chronic.  Stable.  Continue antidepressants and anxiolytics and monitor closely for any needed adjustments.

## 2019-04-09 NOTE — [facility] SECONDARY REVIEW
Physician Advisor External    Level of Care Issue    Approved Observation/outpt for 4/8/19 per ehr md review

## 2019-04-09 NOTE — HPI
Queta Ocampo is a 70 your female with PMHx significant for peripheral neuropathy and depression.  She presented to the ED with a 4 day onset of postural dizziness associated with falls, possible LOC, SOB, and mild HA.  She reported that 4 days ago she fell at home and laid on the floor for 7-1/2 hours before family could come get her.  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  According to her , she has been recently started on diazepam 5 mg and her last Rx for Percocet was prescribed in August of 2018.  In the ED, workup revealed postural hypotension.  She denied fever, cough, chest pain, leg swelling, abdominal pain, urinary symptoms, nausea, vomiting, and diarrhea.  She will be admitted to Hospital Medicine for further evaluation.

## 2019-04-09 NOTE — PLAN OF CARE
CM was informed by [facility] unit superivisor that he had a conversation with patient earlier because he was concerned about her home situation and during the conversation realized that her home situation was not a dangerous one but that patient expressed issues with anxiety, depression and repeatedly asked for medication to help her feel better.  CM met with patient and daughter to complete discharge planning assessment. Patient lives with her grandson, Lory Salazar [phone], grand daughter Annia Garza is [phone]. Patient does have a living will, PCP is Dr. Alexander, pharmacy is [facility]. Daughter is participating in assessment and mentioned that patient does have issues with anxiety and depression, CM asked patient if she would like to get any treatment for those conditions. Patient at this time states that she does not want to get any treatment for her anxiety and depression. CM  left name and number on the white board and also informed to  patient that we will follow her care and if she changed her mind about voluntary treatment for anxiety and depression she could let any of the health care professionals know. CM will follow.      04/09/19 1205   Discharge Assessment   Assessment Type Discharge Planning Assessment   Confirmed/corrected address and phone number on facesheet? Yes   Assessment information obtained from? Patient;Caregiver   Communicated expected length of stay with patient/caregiver yes   Prior to hospitilization cognitive status: Alert/Oriented   Prior to hospitalization functional status: Independent   Current cognitive status: Alert/Oriented   Current Functional Status: Independent   Lives With child(alix), adult   Able to Return to Prior Arrangements yes   Is patient able to care for self after discharge? Unable to determine at this time (comments)   Patient's perception of discharge disposition other (comments)  (possible geripsych)   Readmission Within the Last 30 Days no previous  admission in last 30 days   Patient currently being followed by outpatient case management? No   Patient currently receives any other outside agency services? No   Equipment Currently Used at Home walker, rolling   Do you have any problems affording any of your prescribed medications? No   Is the patient taking medications as prescribed? yes   Does the patient have transportation home? Yes   Transportation Anticipated family or friend will provide   Does the patient receive services at the [facility]? No   Discharge Plan A   (possibly gerpsych)   DME Needed Upon Discharge  none   Patient/Family in Agreement with Plan yes

## 2019-04-09 NOTE — H&P
[facility] Hospital Medicine  History & Physical    Patient Name: Queta Ocampo  MRN: [id number]  Admission Date: 4/8/2019  Attending Physician: Mulu Joseph MD   Primary Care Provider: Jean Alexander MD         Patient information was obtained from patient, past medical records and ER records.     Subjective:     Principal Problem:Orthostatic dizziness    Chief Complaint:   Chief Complaint   Patient presents with    Dizziness     with associated generalized weakness and falling a lot over past 3 weeks.  3 days ago fell and hit her head.  Possible LOC .  Complains of increased sleepiness        HPI: Queta Ocampo is a 70 your female with PMHx significant for peripheral neuropathy and depression.  She presented to the ED with a 4 day onset of postural dizziness associated with falls, possible LOC, SOB, and mild HA.  She reported that 4 days ago she fell at home and laid on the floor for 7-1/2 hours before family could come get her.  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  According to her , she has been recently started on diazepam 5 mg and her last Rx for Percocet was prescribed in August of 2018.  In the ED, workup revealed postural hypotension.  She denied fever, cough, chest pain, leg swelling, abdominal pain, urinary symptoms, nausea, vomiting, and diarrhea.  She will be admitted to Hospital Medicine for further evaluation.    Past Medical History:   Diagnosis Date    Depression     Nervous breakdown     Renal disorder        Past Surgical History:   Procedure Laterality Date    APPENDECTOMY      FOOT SURGERY      SHOULDER SURGERY Left     TUBAL LIGATION         Review of patient's allergies indicates:   Allergen Reactions    Codeine Itching, Swelling, Rash, Other (See Comments) and Nausea And Vomiting     Facial swelling,  itching, rash, erythema    Pcn [penicillins] Itching, Swelling, Rash and Other (See Comments)     Facial swelling with rash, erythema, itching       No current facility-administered medications on file prior to encounter.      Current Outpatient Medications on File Prior to Encounter   Medication Sig    DULoxetine (CYMBALTA) 30 MG capsule Take 30 mg by mouth once daily.     mirtazapine (REMERON) 45 MG tablet Take 45 mg by mouth every evening.     vortioxetine 20 mg Tab Take 20 mg by mouth once daily.     albuterol (PROVENTIL/VENTOLIN HFA) 90 mcg/actuation inhaler Inhale 1-2 puffs into the lungs every 6 (six) hours as needed for Wheezing. Rescue    pregabalin (LYRICA) 300 MG Cap Take 300 mg by mouth 2 (two) times daily.     Family History     None        Tobacco Use    Smoking status: Current Every Day Smoker     Packs/day: 0.50     Types: Cigarettes    Smokeless tobacco: Never Used   Substance and Sexual Activity    Alcohol use: Not Currently    Drug use: Not Currently    Sexual activity: Not on file     Review of Systems   Constitutional: Negative for appetite change, chills, fatigue and fever.   HENT: Negative for hearing loss, postnasal drip, sore throat and trouble swallowing.    Eyes: Negative for photophobia and visual disturbance.   Respiratory: Positive for shortness of breath. Negative for cough and wheezing.    Cardiovascular: Negative for chest pain, palpitations and leg swelling.   Gastrointestinal: Negative for abdominal pain, diarrhea, nausea and vomiting.   Endocrine: Negative for polydipsia, polyphagia and polyuria.   Genitourinary: Negative for dysuria, frequency and urgency.   Musculoskeletal: Positive for gait problem (Chronic, 2/2 peripheral neuropathy). Negative for neck pain and neck stiffness.   Skin: Negative for rash and wound.   Neurological: Positive for dizziness, syncope, light-headedness and headaches. Negative for weakness and numbness.   Hematological: Does not bruise/bleed  easily.   Psychiatric/Behavioral: Negative for confusion. The patient is not nervous/anxious.      Objective:     Vital Signs (Most Recent):  Temp: 97.1 °F (36.2 °C) (04/09/19 0011)  Pulse: 90 (04/09/19 0011)  Resp: 18 (04/09/19 0011)  BP: (!) 145/66 (04/09/19 0011)  SpO2: 96 % (04/09/19 0019) Vital Signs (24h Range):  Temp:  [97.1 °F (36.2 °C)-98.9 °F (37.2 °C)] 97.1 °F (36.2 °C)  Pulse:  [] 90  Resp:  [18] 18  SpO2:  [94 %-97 %] 96 %  BP: ()/(55-71) 145/66     Weight: 71 kg (156 lb 8.4 oz)  Body mass index is 23.8 kg/m².    Physical Exam   Constitutional: She is oriented to person, place, and time. She appears well-developed and well-nourished. No distress.   HENT:   Head: Normocephalic and atraumatic.   Mouth/Throat: Oropharynx is clear and moist.   Eyes: Pupils are equal, round, and reactive to light. Conjunctivae and EOM are normal. No scleral icterus.   Neck: Normal range of motion. Neck supple. No tracheal deviation present. No thyromegaly present.   Cardiovascular: Normal rate, regular rhythm, normal heart sounds and intact distal pulses. Exam reveals no gallop and no friction rub.   No murmur heard.  Pulses:       Dorsalis pedis pulses are 2+ on the right side, and 2+ on the left side.   Pulmonary/Chest: Effort normal and breath sounds normal. No accessory muscle usage. No respiratory distress. She has no wheezes. She has no rhonchi. She has no rales.   Abdominal: Soft. Bowel sounds are normal. She exhibits no distension and no mass. There is no tenderness.   Musculoskeletal: Normal range of motion. She exhibits no edema, tenderness or deformity.   Neurological: She is alert and oriented to person, place, and time. She has normal strength. She exhibits normal muscle tone. Coordination normal.   Skin: Skin is warm, dry and intact. Capillary refill takes 2 to 3 seconds. No rash noted. She is not diaphoretic. No erythema.   Psychiatric: She has a normal mood and affect. Her speech is normal and  behavior is normal. Thought content normal.   Vitals reviewed.        CRANIAL NERVES     CN III, IV, VI   Pupils are equal, round, and reactive to light.  Extraocular motions are normal.        Significant Labs:   CBC:   Recent Labs   Lab 04/08/19 2130   WBC 8.10   HGB 13.8   HCT 41.7        CMP:   Recent Labs   Lab 04/08/19 2130      K 3.9      CO2 28   *   BUN 16   CREATININE 1.0   CALCIUM 10.6*   PROT 7.7   ALBUMIN 4.0   BILITOT 0.4   ALKPHOS 52*   AST 15   ALT 11   ANIONGAP 9   EGFRNONAA 56*     Coagulation:   Recent Labs   Lab 04/08/19 2130   INR 1.0       Troponin:   Recent Labs   Lab 04/08/19 2130   TROPONINI 0.009     TSH:   Recent Labs   Lab 04/08/19 2130   TSH 1.377     Urine Studies:   Recent Labs   Lab 04/08/19 2213   COLORU Yellow   APPEARANCEUA Clear   PHUR 6.0   SPECGRAV 1.025   PROTEINUA Trace*   GLUCUA Negative   KETONESU Negative   BILIRUBINUA Negative   OCCULTUA 1+*   NITRITE Negative   UROBILINOGEN Negative   LEUKOCYTESUR Trace*   RBCUA 2   WBCUA 9*       Significant Imaging:    CXR: no acute pulmonary process (my read)    CT head: IMPRESSION:  Chronic changes as described as above with no acute intracranial abnormality on CT head.     Assessment/Plan:     * Orthostatic dizziness  Patient orthostatics positive in the ED.  CT of the head was negative for acute process.  Will provide IV hydration.  Continuous cardiac monitoring.  Check echocardiogram and carotid ultrasound.  Hold benzos and avoid opiates for now.    Peripheral neuropathy  Chronic problem, recently stopped taking Lyrica although this has been prescribed and is currently on her .  Will avoid opioids if possible 2/2 hypotension.  Restart Lyrica once clinically appropriate.      Depression  Chronic.  Stable.  Continue antidepressants and anxiolytics and monitor closely for any needed adjustments.          VTE Risk Mitigation (From admission, onward)        Ordered     enoxaparin injection 40 mg  Daily       04/09/19 0014     IP VTE HIGH RISK PATIENT  Once      04/09/19 0014             Lisbeth Tavarez NP  Department of Hospital Medicine   [facility]

## 2019-04-09 NOTE — SUBJECTIVE & OBJECTIVE
Past Medical History:   Diagnosis Date    Depression     Nervous breakdown     Renal disorder        Past Surgical History:   Procedure Laterality Date    APPENDECTOMY      FOOT SURGERY      SHOULDER SURGERY Left     TUBAL LIGATION         Review of patient's allergies indicates:   Allergen Reactions    Codeine Itching, Swelling, Rash, Other (See Comments) and Nausea And Vomiting     Facial swelling, itching, rash, erythema    Pcn [penicillins] Itching, Swelling, Rash and Other (See Comments)     Facial swelling with rash, erythema, itching       No current facility-administered medications on file prior to encounter.      Current Outpatient Medications on File Prior to Encounter   Medication Sig    DULoxetine (CYMBALTA) 30 MG capsule Take 30 mg by mouth once daily.     mirtazapine (REMERON) 45 MG tablet Take 45 mg by mouth every evening.     vortioxetine 20 mg Tab Take 20 mg by mouth once daily.     albuterol (PROVENTIL/VENTOLIN HFA) 90 mcg/actuation inhaler Inhale 1-2 puffs into the lungs every 6 (six) hours as needed for Wheezing. Rescue    pregabalin (LYRICA) 300 MG Cap Take 300 mg by mouth 2 (two) times daily.     Family History     None        Tobacco Use    Smoking status: Current Every Day Smoker     Packs/day: 0.50     Types: Cigarettes    Smokeless tobacco: Never Used   Substance and Sexual Activity    Alcohol use: Not Currently    Drug use: Not Currently    Sexual activity: Not on file     Review of Systems   Constitutional: Negative for appetite change, chills, fatigue and fever.   HENT: Negative for hearing loss, postnasal drip, sore throat and trouble swallowing.    Eyes: Negative for photophobia and visual disturbance.   Respiratory: Positive for shortness of breath. Negative for cough and wheezing.    Cardiovascular: Negative for chest pain, palpitations and leg swelling.   Gastrointestinal: Negative for abdominal pain, diarrhea, nausea and vomiting.   Endocrine: Negative for  polydipsia, polyphagia and polyuria.   Genitourinary: Negative for dysuria, frequency and urgency.   Musculoskeletal: Positive for gait problem (Chronic, 2/2 peripheral neuropathy). Negative for neck pain and neck stiffness.   Skin: Negative for rash and wound.   Neurological: Positive for dizziness, syncope, light-headedness and headaches. Negative for weakness and numbness.   Hematological: Does not bruise/bleed easily.   Psychiatric/Behavioral: Negative for confusion. The patient is not nervous/anxious.      Objective:     Vital Signs (Most Recent):  Temp: 97.1 °F (36.2 °C) (04/09/19 0011)  Pulse: 90 (04/09/19 0011)  Resp: 18 (04/09/19 0011)  BP: (!) 145/66 (04/09/19 0011)  SpO2: 96 % (04/09/19 0019) Vital Signs (24h Range):  Temp:  [97.1 °F (36.2 °C)-98.9 °F (37.2 °C)] 97.1 °F (36.2 °C)  Pulse:  [] 90  Resp:  [18] 18  SpO2:  [94 %-97 %] 96 %  BP: ()/(55-71) 145/66     Weight: 71 kg (156 lb 8.4 oz)  Body mass index is 23.8 kg/m².    Physical Exam   Constitutional: She is oriented to person, place, and time. She appears well-developed and well-nourished. No distress.   HENT:   Head: Normocephalic and atraumatic.   Mouth/Throat: Oropharynx is clear and moist.   Eyes: Pupils are equal, round, and reactive to light. Conjunctivae and EOM are normal. No scleral icterus.   Neck: Normal range of motion. Neck supple. No tracheal deviation present. No thyromegaly present.   Cardiovascular: Normal rate, regular rhythm, normal heart sounds and intact distal pulses. Exam reveals no gallop and no friction rub.   No murmur heard.  Pulses:       Dorsalis pedis pulses are 2+ on the right side, and 2+ on the left side.   Pulmonary/Chest: Effort normal and breath sounds normal. No accessory muscle usage. No respiratory distress. She has no wheezes. She has no rhonchi. She has no rales.   Abdominal: Soft. Bowel sounds are normal. She exhibits no distension and no mass. There is no tenderness.   Musculoskeletal: Normal  range of motion. She exhibits no edema, tenderness or deformity.   Neurological: She is alert and oriented to person, place, and time. She has normal strength. She exhibits normal muscle tone. Coordination normal.   Skin: Skin is warm, dry and intact. Capillary refill takes 2 to 3 seconds. No rash noted. She is not diaphoretic. No erythema.   Psychiatric: She has a normal mood and affect. Her speech is normal and behavior is normal. Thought content normal.   Vitals reviewed.        CRANIAL NERVES     CN III, IV, VI   Pupils are equal, round, and reactive to light.  Extraocular motions are normal.        Significant Labs:   CBC:   Recent Labs   Lab 04/08/19 2130   WBC 8.10   HGB 13.8   HCT 41.7        CMP:   Recent Labs   Lab 04/08/19 2130      K 3.9      CO2 28   *   BUN 16   CREATININE 1.0   CALCIUM 10.6*   PROT 7.7   ALBUMIN 4.0   BILITOT 0.4   ALKPHOS 52*   AST 15   ALT 11   ANIONGAP 9   EGFRNONAA 56*     Coagulation:   Recent Labs   Lab 04/08/19 2130   INR 1.0       Troponin:   Recent Labs   Lab 04/08/19 2130   TROPONINI 0.009     TSH:   Recent Labs   Lab 04/08/19 2130   TSH 1.377     Urine Studies:   Recent Labs   Lab 04/08/19 2213   COLORU Yellow   APPEARANCEUA Clear   PHUR 6.0   SPECGRAV 1.025   PROTEINUA Trace*   GLUCUA Negative   KETONESU Negative   BILIRUBINUA Negative   OCCULTUA 1+*   NITRITE Negative   UROBILINOGEN Negative   LEUKOCYTESUR Trace*   RBCUA 2   WBCUA 9*       Significant Imaging:    CXR: no acute pulmonary process (my read)    CT head: IMPRESSION:  Chronic changes as described as above with no acute intracranial abnormality on CT head.

## 2019-04-09 NOTE — ED NOTES
Presents to the ER with c/o weakness that has been persistent for the past few weeks. Patient reports a fall a few days ago when she got up to use the restroom and fell hitting her head on the wall. Patient is unsure of LOC. Associated complaints are fatigue. Mucous membranes are pink and moist. Skin is warm, dry and intact. Lungs are clear bilaterally, respirations are regular and unlabored. Denies cough, congestion, rhinorrhea or SOB. BS active x4, no tenderness with palpation, abd is soft and not distended. Denies any appetite or activity change. S1S2, capillary refill is < 2 seconds. Denies dysuria, difficulty urinating, frequency, numbness, tingling or weakness. CHRISTINE AMES

## 2019-04-09 NOTE — ASSESSMENT & PLAN NOTE
Chronic problem, recently stopped taking Lyrica although this has been prescribed and is currently on her .  Will avoid opioids if possible 2/2 hypotension.  Restart Lyrica once clinically appropriate.
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
## 2019-04-09 NOTE — UM SECONDARY REVIEW
Physician Advisor External    Level of Care Issue    Approved Observation/outpt for 4/9/19 per ehr review..

## 2019-04-09 NOTE — ED NOTES
Upon transfer to room 202, patient is AAOx4, no cardiac or respiratory complications at this time. No needs or questions from patient of family before transfer.
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
## 2019-04-09 NOTE — NURSING
"Daughter Qing and family at bedside. They will bring in pts home medications. They stated " pt will shop multiple pharmacies and will visit multiple Drs to get the to prescribe the meds that she wants. She will only visit w them for one or two times till the drug screen her and will not accept her as a pt anymore. . They did state that pt agreed to go to [facility] at discharge".   "

## 2019-04-09 NOTE — CONSULTS
[facility]  Neurology  Consult Note    Patient Name: Queta Ocampo  MRN: [id number]  Admission Date: 4/8/2019  Hospital Length of Stay: 0 days  Code Status: Full Code   Attending Provider: Mulu Joseph MD   Consulting Provider: SIDNEY Patel  Primary Care Physician: Jean Alexander MD  Principal Problem:Orthostatic dizziness    Consults  Subjective:     Chief Complaint:  dizziness    HPI: Queta Ocampo is a 70 your female with PMHx significant for peripheral neuropathy and depression.  She presented to the ED with a 4 day onset of postural dizziness associated with falls, possible LOC, SOB, and mild HA.  She reported that 4 days ago she fell at home and laid on the floor for 7-1/2 hours before family could come get her.  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  According to her , she has been recently started on diazepam 5 mg and her last Rx for Percocet was prescribed in August of 2018.  In the ED, workup revealed postural hypotension.  She denied fever, cough, chest pain, leg swelling, abdominal pain, urinary symptoms, nausea, vomiting, and diarrhea.  She will be admitted to Hospital Medicine for further evaluation.    She reports that she did have passing out/LOC with the dizziness as well as shakiness but denies any incontinence or biting her tongue.      CRT: 0.9  A1c: pending     Brain imaging:  CT head:   1. No acute intracranial abnormality appreciated.  No interval detrimental change in the imaging appearance of the brain.  2. Mucosal retention cyst formation within the right posterior ethmoid air cells.    Neck Imaging:  CUS:   No evidence of a hemodynamically significant carotid bifurcation stenosis.    Cardiac imaging:  ECHO: pending     Past Medical History:   Diagnosis Date    Depression     Nervous breakdown     Renal  disorder        Past Surgical History:   Procedure Laterality Date    APPENDECTOMY      FOOT SURGERY      SHOULDER SURGERY Left     TUBAL LIGATION         Review of patient's allergies indicates:   Allergen Reactions    Codeine Itching, Swelling, Rash, Other (See Comments) and Nausea And Vomiting     Facial swelling, itching, rash, erythema    Pcn [penicillins] Itching, Swelling, Rash and Other (See Comments)     Facial swelling with rash, erythema, itching       Current Neurological Medications: reviewed     No current facility-administered medications on file prior to encounter.      Current Outpatient Medications on File Prior to Encounter   Medication Sig    busPIRone (BUSPAR) 10 MG tablet Take 10 mg by mouth 3 (three) times daily.    DULoxetine (CYMBALTA) 30 MG capsule Take 30 mg by mouth once daily.     mirtazapine (REMERON) 45 MG tablet Take 45 mg by mouth every evening.     vortioxetine 20 mg Tab Take 20 mg by mouth once daily.     albuterol (PROVENTIL/VENTOLIN HFA) 90 mcg/actuation inhaler Inhale 1-2 puffs into the lungs every 6 (six) hours as needed for Wheezing. Rescue    pregabalin (LYRICA) 300 MG Cap Take 300 mg by mouth 2 (two) times daily.      Family History     None        Tobacco Use    Smoking status: Current Every Day Smoker     Packs/day: 0.50     Types: Cigarettes    Smokeless tobacco: Never Used   Substance and Sexual Activity    Alcohol use: Not Currently    Drug use: Not Currently    Sexual activity: Not on file     Review of Systems   Constitutional: Negative.    HENT: Negative.    Eyes: Negative.    Respiratory: Negative.    Cardiovascular: Negative.    Gastrointestinal: Negative.    Endocrine: Negative.    Genitourinary: Negative.    Musculoskeletal: Negative.    Skin: Negative.    Neurological: Positive for dizziness, syncope, weakness and numbness.   Hematological: Negative.    Psychiatric/Behavioral: Negative.      Objective:     Vital Signs (Most Recent):  Temp: 97.4  °F (36.3 °C) (04/09/19 1529)  Pulse: 84 (04/09/19 1529)  Resp: 18 (04/09/19 1529)  BP: (!) 150/72 (04/09/19 1529)  SpO2: (!) 92 % (04/09/19 1529) Vital Signs (24h Range):  Temp:  [97.1 °F (36.2 °C)-98.9 °F (37.2 °C)] 97.4 °F (36.3 °C)  Pulse:  [] 84  Resp:  [16-18] 18  SpO2:  [92 %-97 %] 92 %  BP: ()/(55-72) 150/72     Weight: 70.8 kg (156 lb)  Body mass index is 23.72 kg/m².    Physical Exam   Constitutional: She is oriented to person, place, and time. She appears well-developed and well-nourished.   HENT:   Head: Normocephalic and atraumatic.   Eyes: Pupils are equal, round, and reactive to light. EOM are normal.   Neck: Normal range of motion. Neck supple.   Cardiovascular: Normal rate and regular rhythm.   Pulmonary/Chest: Effort normal and breath sounds normal.   Abdominal: Soft. Bowel sounds are normal.   Musculoskeletal: Normal range of motion.   Neurological: She is alert and oriented to person, place, and time. She has normal reflexes. A sensory deficit is present. She has a normal Finger-Nose-Finger Test.   Decreased sensation to bilateral feet   Skin: Skin is warm and dry.   Psychiatric: She has a normal mood and affect.       NEUROLOGICAL EXAMINATION:     MENTAL STATUS   Oriented to person, place, and time.   Level of consciousness: alert    CRANIAL NERVES   Cranial nerves II through XII intact.     CN III, IV, VI   Pupils are equal, round, and reactive to light.  Extraocular motions are normal.     GAIT AND COORDINATION      Coordination   Finger to nose coordination: normal    Tremor   Resting tremor: absent  Intention tremor: absent       Gait not tested        Significant Labs: All pertinent lab results from the past 24 hours have been reviewed.    Significant Imaging: I have reviewed and interpreted all pertinent imaging results/findings within the past 24 hours.    Assessment and Plan:     Active Diagnoses:    Diagnosis Date Noted POA    PRINCIPAL PROBLEM:  Orthostatic dizziness [R42]  04/08/2019 Yes    Depression [F32.9] 04/09/2019 Yes    Peripheral neuropathy [G62.9] 04/09/2019 Yes      Problems Resolved During this Admission:       VTE Risk Mitigation (From admission, onward)        Ordered     enoxaparin injection 40 mg  Daily      04/09/19 0014     IP VTE HIGH RISK PATIENT  Once      04/09/19 0014        Dizziness  Positive orthostatics in ER   CT head negative   CUS shows no stenosis  F/U echo  MRI/A non contrast   EEG 30 minutes tomorrow     Peripheral neuropathy  Chronic problems  Recently stopped Lyrica and treating with Percocet  Recommend to avoid narcotics and restart Lyrica when appropriated  Recommend outpatient EMG  F/U B12, B1, A1C    Thank you for your consult. I will follow-up with patient. Please contact us if you have any additional questions.    Dilma Vigil, SIDNEY  Neurology  [facility]    I, Dr. Enoch Clark, have personally seen and examined the patient with my advanced provider and agree with above. I personally did a focused exam, and reviewed all necessary clinical information. I discussed my management plan with my NP and agree with above.

## 2019-04-10 LAB
ALBUMIN SERPL BCP-MCNC: 3.2 G/DL (ref 3.5–5.2)
ALP SERPL-CCNC: 43 U/L (ref 55–135)
ALT SERPL W/O P-5'-P-CCNC: 9 U/L (ref 10–44)
ANION GAP SERPL CALC-SCNC: 8 MMOL/L (ref 8–16)
AORTIC ROOT ANNULUS: 3.08 CM
AORTIC VALVE CUSP SEPERATION: 1.41 CM
AST SERPL-CCNC: 13 U/L (ref 10–40)
AV INDEX (PROSTH): 0.58
AV MEAN GRADIENT: 15.27 MMHG
AV PEAK GRADIENT: 28.3 MMHG
AV VALVE AREA: 1.84 CM2
AV VELOCITY RATIO: 0.54
BASOPHILS # BLD AUTO: 0 K/UL (ref 0–0.2)
BASOPHILS NFR BLD: 0.4 % (ref 0–1.9)
BILIRUB SERPL-MCNC: 0.3 MG/DL (ref 0.1–1)
BSA FOR ECHO PROCEDURE: 1.84 M2
BUN SERPL-MCNC: 13 MG/DL (ref 8–23)
CALCIUM SERPL-MCNC: 9.5 MG/DL (ref 8.7–10.5)
CHLORIDE SERPL-SCNC: 106 MMOL/L (ref 95–110)
CO2 SERPL-SCNC: 26 MMOL/L (ref 23–29)
CORTIS SERPL-MCNC: 7.9 UG/DL
CREAT SERPL-MCNC: 0.8 MG/DL (ref 0.5–1.4)
CV ECHO LV RWT: 0.52 CM
DIFFERENTIAL METHOD: ABNORMAL
DOP CALC AO PEAK VEL: 2.66 M/S
DOP CALC AO VTI: 47.6 CM
DOP CALC LVOT AREA: 3.2 CM2
DOP CALC LVOT DIAMETER: 2.02 CM
DOP CALC LVOT PEAK VEL: 1.42 M/S
DOP CALC LVOT STROKE VOLUME: 87.8 CM3
DOP CALCLVOT PEAK VEL VTI: 27.41 CM
E WAVE DECELERATION TIME: 209.76 MSEC
E/A RATIO: 0.72
E/E' RATIO: 10.5
ECHO LV POSTERIOR WALL: 1.03 CM (ref 0.6–1.1)
EOSINOPHIL # BLD AUTO: 0.2 K/UL (ref 0–0.5)
EOSINOPHIL NFR BLD: 3.2 % (ref 0–8)
ERYTHROCYTE [DISTWIDTH] IN BLOOD BY AUTOMATED COUNT: 14 % (ref 11.5–14.5)
EST. GFR  (AFRICAN AMERICAN): >60 ML/MIN/1.73 M^2
EST. GFR  (NON AFRICAN AMERICAN): >60 ML/MIN/1.73 M^2
FRACTIONAL SHORTENING: 29 % (ref 28–44)
GLUCOSE SERPL-MCNC: 104 MG/DL (ref 70–110)
HCT VFR BLD AUTO: 36.1 % (ref 37–48.5)
HGB BLD-MCNC: 12.1 G/DL (ref 12–16)
INTERVENTRICULAR SEPTUM: 0.88 CM (ref 0.6–1.1)
IVRT: 0.1 MSEC
LEFT ATRIUM SIZE: 3.79 CM
LEFT INTERNAL DIMENSION IN SYSTOLE: 2.8 CM (ref 2.1–4)
LEFT VENTRICLE DIASTOLIC VOLUME INDEX: 36.7 ML/M2
LEFT VENTRICLE DIASTOLIC VOLUME: 67.5 ML
LEFT VENTRICLE MASS INDEX: 63.2 G/M2
LEFT VENTRICLE SYSTOLIC VOLUME INDEX: 16.1 ML/M2
LEFT VENTRICLE SYSTOLIC VOLUME: 29.58 ML
LEFT VENTRICULAR INTERNAL DIMENSION IN DIASTOLE: 3.94 CM (ref 3.5–6)
LEFT VENTRICULAR MASS: 116.28 G
LV LATERAL E/E' RATIO: 9
LV SEPTAL E/E' RATIO: 12.6
LYMPHOCYTES # BLD AUTO: 2.1 K/UL (ref 1–4.8)
LYMPHOCYTES NFR BLD: 34.6 % (ref 18–48)
MAGNESIUM SERPL-MCNC: 1.9 MG/DL (ref 1.6–2.6)
MCH RBC QN AUTO: 30.2 PG (ref 27–31)
MCHC RBC AUTO-ENTMCNC: 33.6 G/DL (ref 32–36)
MCV RBC AUTO: 90 FL (ref 82–98)
MONOCYTES # BLD AUTO: 0.6 K/UL (ref 0.3–1)
MONOCYTES NFR BLD: 9.3 % (ref 4–15)
MV PEAK A VEL: 0.88 M/S
MV PEAK E VEL: 0.63 M/S
NEUTROPHILS # BLD AUTO: 3.1 K/UL (ref 1.8–7.7)
NEUTROPHILS NFR BLD: 52.5 % (ref 38–73)
PHOSPHATE SERPL-MCNC: 3.4 MG/DL (ref 2.7–4.5)
PISA TR MAX VEL: 2.25 M/S
PLATELET # BLD AUTO: 184 K/UL (ref 150–350)
PMV BLD AUTO: 8.2 FL (ref 9.2–12.9)
POCT GLUCOSE: 118 MG/DL (ref 70–110)
POCT GLUCOSE: 126 MG/DL (ref 70–110)
POCT GLUCOSE: 143 MG/DL (ref 70–110)
POTASSIUM SERPL-SCNC: 3.6 MMOL/L (ref 3.5–5.1)
PROT SERPL-MCNC: 6.4 G/DL (ref 6–8.4)
PV PEAK VELOCITY: 1.04 CM/S
RA PRESSURE: 3 MMHG
RBC # BLD AUTO: 4.02 M/UL (ref 4–5.4)
RIGHT VENTRICULAR END-DIASTOLIC DIMENSION: 2.58 CM
SODIUM SERPL-SCNC: 140 MMOL/L (ref 136–145)
TDI LATERAL: 0.07
TDI SEPTAL: 0.05
TDI: 0.06
TR MAX PG: 20.25 MMHG
TRICUSPID ANNULAR PLANE SYSTOLIC EXCURSION: 3.01 CM
TV REST PULMONARY ARTERY PRESSURE: 23 MMHG
WBC # BLD AUTO: 6 K/UL (ref 3.9–12.7)

## 2019-04-10 PROCEDURE — 80053 COMPREHEN METABOLIC PANEL: CPT

## 2019-04-10 PROCEDURE — 95816 EEG AWAKE AND DROWSY: CPT | Mod: 26,,, | Performed by: PSYCHIATRY & NEUROLOGY

## 2019-04-10 PROCEDURE — 84100 ASSAY OF PHOSPHORUS: CPT

## 2019-04-10 PROCEDURE — 25000003 PHARM REV CODE 250: Performed by: INTERNAL MEDICINE

## 2019-04-10 PROCEDURE — 63600175 PHARM REV CODE 636 W HCPCS: Performed by: INTERNAL MEDICINE

## 2019-04-10 PROCEDURE — 25000003 PHARM REV CODE 250: Performed by: NURSE PRACTITIONER

## 2019-04-10 PROCEDURE — 36415 COLL VENOUS BLD VENIPUNCTURE: CPT

## 2019-04-10 PROCEDURE — G0378 HOSPITAL OBSERVATION PER HR: HCPCS

## 2019-04-10 PROCEDURE — 63600175 PHARM REV CODE 636 W HCPCS: Performed by: NURSE PRACTITIONER

## 2019-04-10 PROCEDURE — 97161 PT EVAL LOW COMPLEX 20 MIN: CPT | Performed by: PHYSICAL THERAPIST

## 2019-04-10 PROCEDURE — 83735 ASSAY OF MAGNESIUM: CPT

## 2019-04-10 PROCEDURE — 95816 PR EEG,W/AWAKE & DROWSY RECORD: ICD-10-PCS | Mod: 26,,, | Performed by: PSYCHIATRY & NEUROLOGY

## 2019-04-10 PROCEDURE — 85025 COMPLETE CBC W/AUTO DIFF WBC: CPT

## 2019-04-10 PROCEDURE — 96376 TX/PRO/DX INJ SAME DRUG ADON: CPT

## 2019-04-10 PROCEDURE — 94761 N-INVAS EAR/PLS OXIMETRY MLT: CPT

## 2019-04-10 PROCEDURE — 97116 GAIT TRAINING THERAPY: CPT | Performed by: PHYSICAL THERAPIST

## 2019-04-10 RX ADMIN — PANTOPRAZOLE SODIUM 40 MG: 40 TABLET, DELAYED RELEASE ORAL at 09:04

## 2019-04-10 RX ADMIN — ENOXAPARIN SODIUM 40 MG: 100 INJECTION SUBCUTANEOUS at 04:04

## 2019-04-10 RX ADMIN — PREGABALIN 150 MG: 75 CAPSULE ORAL at 08:04

## 2019-04-10 RX ADMIN — DULOXETINE 30 MG: 30 CAPSULE, DELAYED RELEASE ORAL at 09:04

## 2019-04-10 RX ADMIN — CIPROFLOXACIN 400 MG: 2 INJECTION, SOLUTION INTRAVENOUS at 12:04

## 2019-04-10 RX ADMIN — CIPROFLOXACIN 400 MG: 2 INJECTION, SOLUTION INTRAVENOUS at 10:04

## 2019-04-10 RX ADMIN — VORTIOXETINE 20 MG: 20 TABLET, FILM COATED ORAL at 09:04

## 2019-04-10 RX ADMIN — FLUTICASONE PROPIONATE 100 MCG: 50 SPRAY, METERED NASAL at 09:04

## 2019-04-10 RX ADMIN — PREGABALIN 150 MG: 75 CAPSULE ORAL at 09:04

## 2019-04-10 NOTE — PROGRESS NOTES
04/09/19 2005   Patient Assessment/Suction   Level of Consciousness (AVPU) alert   PRE-TX-O2   O2 Device (Oxygen Therapy) room air   SpO2 (!) 94 %   Pulse Oximetry Type Intermittent

## 2019-04-10 NOTE — PLAN OF CARE
"I called [facility] at [phone] and was informed that they no longer have a [facility] psych unit. I will provide the pt with a list of [facility]-psych facilities as well as outpatient behavioral health agencies however case management does not facilitate the admission of Voluntary psych admits. The pt can be discharged and voluntarily admit herself. Cm will continue to follow. Padmini Rivera LMSW     10:00- I met with the pt at bedside and she did not state that she was willing to go anywhere voluntarily. She even asked what a [facility] psych facility is and asked, "is that like for therapy?" She then rolled her eyes and shook her head no. I provided her with a listing of [facility] psych facilities and outpatient behavioral health agencies. Padmini Rivera LMSW       04/10/19 0950   Discharge Assessment   Assessment Type Discharge Planning Reassessment       "

## 2019-04-10 NOTE — UM SECONDARY REVIEW
Physician Advisor External    Level of Care Issue    Dr Kee at [facility] rec NOTA for date 4/10/19

## 2019-04-10 NOTE — PLAN OF CARE
Problem: Physical Therapy Goal  Goal: Physical Therapy Goal  Goals to be met by: 19     Patient will increase functional independence with mobility by performin. Supine to sit with [place]  2. Sit to supine with [place]  3. Sit to stand transfer with Modified [place] using Rolling Walker  4. Gait  x 250 feet with Modified [place] using Rolling Walker.   5. Stand for 10 minutes with Modified [place] using Rolling Walker  6. Lower extremity exercise program x10-20 reps per handout, with independence    Outcome: Ongoing (interventions implemented as appropriate)  PT goals established, transfer and gait training implemented

## 2019-04-10 NOTE — PLAN OF CARE
Problem: Adult Inpatient Plan of Care  Goal: Plan of Care Review  Outcome: Ongoing (interventions implemented as appropriate)  Patient alert and oriented to self. resting in bed. NAD. Denies pain or SOB. VSS. Up with assistance to BR. Sitter at bedside for safety.Normal Sr. Plan of care reviewed with patient. Verbalizes understanding by just shaking her head yes/ .Call light in reach. Pt free from fall or injury. Will monitor.

## 2019-04-10 NOTE — PROGRESS NOTES
Progress Note  Hospital Medicine  Patient Name:Queta Ocampo  MRN:  [id number]  Patient Class: OP- Observation  Admit Date: 4/8/2019  Length of Stay: 0 days  Expected Discharge Date:   Attending Physician: Mulu Joseph MD  Primary Care Provider:  Jean Alexander MD    SUBJECTIVE:     Principal Problem: Orthostatic dizziness  Initial history of present illness:  Queta Ocampo is a 70 your female with PMHx significant for peripheral neuropathy and depression.  She presented to the ED with a 4 day onset of postural dizziness associated with falls, possible LOC, SOB, and mild HA.  She reported that 4 days ago she fell at home and laid on the floor for 7-1/2 hours before family could come get her.  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  According to her , she has been recently started on diazepam 5 mg and her last Rx for Percocet was prescribed in August of 2018.  In the ED, workup revealed postural hypotension.  She denied fever, cough, chest pain, leg swelling, abdominal pain, urinary symptoms, nausea, vomiting, and diarrhea.  She will be admitted to Hospital Medicine for further evaluation.    PMH/PSH/SH/FH/Meds: reviewed.    Symptoms/Review of Systems:  Resting, getting EEG completed. No shortness of breath, cough, chest pain or headache, fever or abdominal pain.     Diet:  Adequate intake.    Activity level:  Up with assist  Pain:  Patient reports no pain.       OBJECTIVE:   Vital Signs (Most Recent):      Temp: 98.3 °F (36.8 °C) (04/10/19 0717)  Pulse: 82 (04/10/19 0717)  Resp: 12 (04/10/19 0717)  BP: (!) 142/62 (04/10/19 0717)  SpO2: (!) 93 % (04/10/19 0717)       Vital Signs Range (Last 24H):  Temp:  [96.3 °F (35.7 °C)-98.3 °F (36.8 °C)]   Pulse:  []   Resp:  [12-18]   BP: (114-150)/(54-72)   SpO2:  [92 %-94 %]     Weight: 70.8 kg (156 lb)  Body mass index  is 23.72 kg/m².    Intake/Output Summary (Last 24 hours) at 4/10/2019 0930  Last data filed at 4/9/2019 1800  Gross per 24 hour   Intake 1805 ml   Output --   Net 1805 ml     Physical Examination:  Constitutional: She is oriented to person, place, and time. She appears well-developed and well-nourished. No distress.   HENT:   Head: Normocephalic and atraumatic.   Mouth/Throat: Oropharynx is clear and moist.   Eyes: Pupils are equal, round, and reactive to light. Conjunctivae and EOM are normal. No scleral icterus.   Neck: Normal range of motion. Neck supple. No tracheal deviation present. No thyromegaly present.   Cardiovascular: Normal rate, regular rhythm, normal heart sounds and intact distal pulses. Exam reveals no gallop and no friction rub.   No murmur heard.  Pulses:       Dorsalis pedis pulses are 2+ on the right side, and 2+ on the left side.   Pulmonary/Chest: Effort normal and breath sounds normal. No accessory muscle usage. No respiratory distress. She has no wheezes. She has no rhonchi. She has no rales.   Abdominal: Soft. Bowel sounds are normal. She exhibits no distension and no mass. There is no tenderness.   Musculoskeletal: Normal range of motion. She exhibits no edema, tenderness or deformity.   Neurological: She is alert and oriented to person, place, and time. She has normal strength. She exhibits normal muscle tone. Coordination normal.   Skin: Skin is warm, dry and intact. Capillary refill takes 2 to 3 seconds. No rash noted. She is not diaphoretic. No erythema.   Psychiatric: She has a normal mood and affect. Her speech is normal and behavior is normal. Thought content normal.   Vitals reviewed.  CRANIAL NERVES   CN III, IV, VI   Pupils are equal, round, and reactive to light.  Extraocular motions are normal.     CBC:  Recent Labs   Lab 04/08/19  2130 04/09/19  0414 04/10/19  0505   WBC 8.10 7.40 6.00   RBC 4.57 4.17 4.02   HGB 13.8 12.6 12.1   HCT 41.7 37.7 36.1*    193 184   MCV 91  91 90   MCH 30.1 30.1 30.2   MCHC 33.0 33.3 33.6   BMP  Recent Labs   Lab 04/08/19  2130 04/09/19  0414 04/10/19  0504   * 111* 104    138 140   K 3.9 3.7 3.6    102 106   CO2 28 28 26   BUN 16 14 13   CREATININE 1.0 0.9 0.8   CALCIUM 10.6* 9.9 9.5   MG 2.0 1.8 1.9      Diagnostic Results:  Microbiology Results (last 7 days)     ** No results found for the last 168 hours. **         CT head without contrast:  1. No acute intracranial abnormality appreciated.  No interval detrimental change in the imaging appearance of the brain.  2. Mucosal retention cyst formation within the right posterior ethmoid air cells.    Chest x-ray: No acute process.  No significant change.    Bilateral carotid ultrasound: No evidence of a hemodynamically significant carotid bifurcation stenosis.    MRA of brain:   Intracranial atherosclerosis without evidence of aneurysm.    MRI of brain :   No evidence of acute infarct, mass effect or abnormal extra-axial collection    Moderate pontine and periventricular white matter signal alteration unchanged relative to December 4, 2017 suggesting microvascular changes.    Assessment/Plan:   * Orthostatic dizziness  Much improved.  Not orthostatic today.  Patient orthostatics positive in the ED.  CT of the head was negative for acute process.   continue IV hydration.  Continuous cardiac monitoring.  Follow echocardiogram and carotid ultrasound.  Hold benzos and avoid opiates for now.  Follow EEG results     Peripheral neuropathy  Chronic problem, recently stopped taking Lyrica although this has been prescribed and is currently on her .  Will avoid opioids if possible 2/2 hypotension.  Restart Lyrica once clinically appropriate.     Depression  Chronic.  Stable.  Continue antidepressants and anxiolytics and monitor closely for any needed adjustments.       VTE Risk Mitigation (From admission, onward)        Ordered     enoxaparin injection 40 mg  Daily      04/09/19 0014     IP  VTE HIGH RISK PATIENT  Once      04/09/19 0014        Mulu Joseph MD  Department of Hospital Medicine   [facility]

## 2019-04-10 NOTE — PROGRESS NOTES
[facility]  Neurology  Progress Note    Patient Name: Queta Ocampo  MRN: [id number]  Admission Date: 4/8/2019  Hospital Length of Stay: 0 days  Code Status: Full Code   Attending Provider: Mulu Joseph MD  Primary Care Physician: Jean Alexander MD   Principal Problem:Orthostatic dizziness    Subjective:     Chief Complaint:  dizziness     HPI: Queta Ocampo is a 70 your female with PMHx significant for peripheral neuropathy and depression.  She presented to the ED with a 4 day onset of postural dizziness associated with falls, possible LOC, SOB, and mild HA.  She reported that 4 days ago she fell at home and laid on the floor for 7-1/2 hours before family could come get her.  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  According to her , she has been recently started on diazepam 5 mg and her last Rx for Percocet was prescribed in August of 2018.  In the ED, workup revealed postural hypotension.  She denied fever, cough, chest pain, leg swelling, abdominal pain, urinary symptoms, nausea, vomiting, and diarrhea.  She reports that she did have passing out/LOC with the dizziness as well as shakiness but denies any incontinence or biting her tongue.    Interval hx: Pt seen & examined with Dr. Enoch Clark. Non-focal exam. Nursing reports that she was orthostatic upon admission. She is in NAD.      CRT: 0.9  A1c: 6.1   B12, folate, TSH: WNL     Brain imaging:  CT head:   1. No acute intracranial abnormality appreciated.  No interval detrimental change in the imaging appearance of the brain.  2. Mucosal retention cyst formation within the right posterior ethmoid air cells.    MRI: No evidence of acute infarct, mass effect or abnormal extra-axial collection.  Moderate pontine and periventricular white matter signal alteration unchanged relative to December 4,  2017 suggesting microvascular changes    MRA: Intracranial atherosclerosis without evidence of aneurysm.    Neck Imaging:  CUS:   No evidence of a hemodynamically significant carotid bifurcation stenosis.     Cardiac imaging:  ECHO: pending     EEG: pending      Current Neurological Medications: see MAR    Current Facility-Administered Medications   Medication Dose Route Frequency Provider Last Rate Last Dose    0.9%  NaCl infusion   Intravenous Continuous Lisbeth Mc  mL/hr at 04/09/19 0856      acetaminophen tablet 1,000 mg  1,000 mg Oral Q6H PRN Libseth Mc NP   1,000 mg at 04/09/19 1231    ciprofloxacin (CIPRO)400mg/200ml D5W IVPB 400 mg  400 mg Intravenous Q12H Mulu Joseph  mL/hr at 04/10/19 0022 400 mg at 04/10/19 0022    dextrose 50% injection 12.5 g  12.5 g Intravenous PRN Lisbeth Mc NP        dextrose 50% injection 25 g  25 g Intravenous PRN Lisbeth Mc NP        DULoxetine DR capsule 30 mg  30 mg Oral Daily Lisbeth Mc NP   30 mg at 04/10/19 0908    enoxaparin injection 40 mg  40 mg Subcutaneous Daily Lisbeth Mc NP        fluticasone 50 mcg/actuation nasal spray 100 mcg  2 spray Each Nare Daily Mulu Joseph MD   100 mcg at 04/10/19 0900    glucagon (human recombinant) injection 1 mg  1 mg Intramuscular PRN Lisbeth Mc NP        glucose chewable tablet 16 g  16 g Oral PRN Lisbeth Mc NP        glucose chewable tablet 24 g  24 g Oral PRN Lisbeth Mc NP        magnesium oxide tablet 800 mg  800 mg Oral PRN Lisbeth Mc NP        magnesium oxide tablet 800 mg  800 mg Oral PRN Lisbeth Mc NP        nicotine 14 mg/24 hr 1 patch  1 patch Transdermal Daily Mulu Joseph MD        ondansetron injection 8 mg  8 mg Intravenous Q8H PRN Lisbeth Mc NP        pantoprazole EC tablet 40 mg  40 mg Oral Daily Lisbeth Mc NP   40 mg at 04/10/19 0908    potassium chloride 10% oral solution 40 mEq  40 mEq  Oral PRN Lisbeth Mc NP        potassium chloride 10% oral solution 60 mEq  60 mEq Oral PRN Lisbeth Mc NP        pregabalin capsule 150 mg  150 mg Oral BID Mulu Joseph MD   150 mg at 04/10/19 0937    senna-docusate 8.6-50 mg per tablet 1 tablet  1 tablet Oral BID PRN Lisbeth Mc NP        sodium chloride 0.9% flush 10 mL  10 mL Intravenous PRN Lisbeth Mc NP        vortioxetine Tab 20 mg  20 mg Oral Daily Lisbeth Mc NP   20 mg at 04/10/19 0907       Review of Systems   Constitutional: Negative for activity change, appetite change, chills and fever.   HENT: Negative for congestion, rhinorrhea, sneezing, sore throat and tinnitus.    Eyes: Negative for photophobia, discharge and visual disturbance.   Respiratory: Negative for cough, shortness of breath and wheezing.    Cardiovascular: Negative for chest pain, palpitations and leg swelling.   Gastrointestinal: Negative for abdominal distention, abdominal pain, constipation, diarrhea, nausea and vomiting.   Endocrine: Negative for cold intolerance and heat intolerance.   Genitourinary: Negative for dysuria, flank pain, frequency, hematuria and urgency.   Musculoskeletal: Negative for arthralgias, gait problem, joint swelling, neck pain and neck stiffness.   Skin: Negative for color change and rash.   Allergic/Immunologic: Negative for immunocompromised state.   Neurological: Positive for dizziness, weakness, light-headedness and numbness. Negative for tremors, seizures, syncope, facial asymmetry, speech difficulty and headaches.   Hematological: Does not bruise/bleed easily.   Psychiatric/Behavioral: Negative for behavioral problems and hallucinations.     Objective:     Vital Signs (Most Recent):  Temp: 98.3 °F (36.8 °C) (04/10/19 0717)  Pulse: 82 (04/10/19 0717)  Resp: 12 (04/10/19 0717)  BP: (!) 142/62 (04/10/19 0717)  SpO2: (!) 93 % (04/10/19 0717) Vital Signs (24h Range):  Temp:  [96.3 °F (35.7 °C)-98.3 °F (36.8 °C)] 98.3  °F (36.8 °C)  Pulse:  [] 82  Resp:  [12-18] 12  SpO2:  [92 %-94 %] 93 %  BP: (114-150)/(54-72) 142/62     Weight: 70.8 kg (156 lb)  Body mass index is 23.72 kg/m².    Physical Exam   Constitutional: She is oriented to person, place, and time. She appears well-developed and well-nourished.   HENT:   Head: Normocephalic and atraumatic.   Eyes: Pupils are equal, round, and reactive to light. Conjunctivae and EOM are normal.   Neck: Normal range of motion. Neck supple.   Cardiovascular: Normal rate, regular rhythm and normal heart sounds.   Pulmonary/Chest: Breath sounds normal.   Abdominal: Soft. Bowel sounds are normal.   Musculoskeletal: Normal range of motion.   Neurological: She is alert and oriented to person, place, and time. She has a normal Finger-Nose-Finger Test.   Reflex Scores:       Tricep reflexes are 2+ on the right side and 2+ on the left side.       Bicep reflexes are 2+ on the right side and 2+ on the left side.       Brachioradialis reflexes are 2+ on the right side and 2+ on the left side.       Patellar reflexes are 2+ on the right side and 2+ on the left side.       Achilles reflexes are 2+ on the right side and 2+ on the left side.  Skin: Skin is warm and dry.   Psychiatric: Her behavior is normal.       NEUROLOGICAL EXAMINATION:     MENTAL STATUS   Oriented to person, place, and time.   Attention: normal.   Level of consciousness: alert    CRANIAL NERVES   Cranial nerves II through XII intact.     CN III, IV, VI   Pupils are equal, round, and reactive to light.  Extraocular motions are normal.     MOTOR EXAM     Strength   Right neck flexion: 4/5  Left neck flexion: 4/5  Right neck extension: 4/5  Left neck extension: 4/5  Right deltoid: 4/5  Left deltoid: 4/5  Right biceps: 4/5  Left biceps: 4/5  Right triceps: 4/5  Left triceps: 4/5  Right wrist flexion: 4/5  Left wrist flexion: 4/5  Right wrist extension: 4/5  Left wrist extension: 4/5  Right interossei: 4/5  Left interossei: 4/5  Right  abdominals: 4/5  Left abdominals: 4/5  Right iliopsoas: 4/5  Left iliopsoas: 4/5  Right quadriceps: 4/5  Left quadriceps: 4/5  Right hamstrin/5  Left hamstrin/5  Right glutei: 4/5  Left glutei: 4/5  Right anterior tibial: 4/5  Left anterior tibial: 4/5  Right posterior tibial: 4/5  Left posterior tibial: 4/5  Right peroneal: 4/5  Left peroneal: 4/5  Right gastroc: 4/5  Left gastroc: 4/5    REFLEXES     Reflexes   Right brachioradialis: 2+  Left brachioradialis: 2+  Right biceps: 2+  Left biceps: 2+  Right triceps: 2+  Left triceps: 2+  Right patellar: 2+  Left patellar: 2+  Right achilles: 2+  Left achilles: 2+  Right plantar: normal  Left plantar: normal    SENSORY EXAM   Light touch normal.        Pt reports baseline tingling sensations bilateral feet     GAIT AND COORDINATION      Coordination   Finger to nose coordination: normal    Tremor   Resting tremor: absent  Intention tremor: absent  Action tremor: absent      Significant Labs: All pertinent lab results from the past 24 hours have been reviewed.    Significant Imaging: I have reviewed and interpreted all pertinent imaging results/findings within the past 24 hours.    Assessment and Plan:     Dizziness  Positive orthostatics upon admission   F/U echo  MRI/MRA/CUS unremarkable   EEG pending   PT/OT eval & treat     Peripheral neuropathy  Chronic problems  Recently stopped Lyrica and treating with Percocet  Recommend to avoid narcotics and restart Lyrica at discretion of HM  Recommend outpatient EMG/NCS  Lab workup unremarkable    Stroke education was provided.  If patient has acute neurological changes including weakness, confusion, speech changes, facial droop, difficulty walking, and sensory changes immediately call 911.  Follow up Neurology in 2 weeks at [phone].        Active Diagnoses:    Diagnosis Date Noted POA    PRINCIPAL PROBLEM:  Orthostatic dizziness [R42] 2019 Yes    Depression [F32.9] 2019 Yes    Peripheral  neuropathy [G62.9] 04/09/2019 Yes      Problems Resolved During this Admission:       VTE Risk Mitigation (From admission, onward)        Ordered     enoxaparin injection 40 mg  Daily      04/09/19 0014     IP VTE HIGH RISK PATIENT  Once      04/09/19 0014          Laila Cummings NP  [facility]    I, Dr. Enoch Clark, discussed care with my advanced practitioner and agree with above. I have reviewed patient clinical presentation, work up, impression and plan.

## 2019-04-10 NOTE — PT/OT/SLP EVAL
Physical Therapy Evaluation    Patient Name:  Queta Ocampo   MRN:  [id number]    Recommendations:     Discharge Recommendations:  home health PT   Discharge Equipment Recommendations: walker, rolling   Barriers to discharge: None    Assessment:     Queta Ocampo is a 74 y.o. female admitted with a medical diagnosis of Orthostatic dizziness.  She presents with the following impairments/functional limitations:  weakness, impaired endurance, impaired self care skills, impaired functional mobilty, gait instability, impaired balance, decreased upper extremity function, decreased lower extremity function, decreased safety awareness, impaired cardiopulmonary response to activity. Pt was able to get to EOB with supervision and scoot hips to EOB with supervision after performing global strength assessment. Pt's supine BP was 136/62 and sitting BP was 140/59. Pt then performed sitt o stand transfer with [place] and RW and pt's standing BP was 114/56. Pt then ambulated 120 feet with CGA and RW with slow shuffling gait pattern. Pt returned to room and was able to get to supine with CGA and was left in bed with all needs and call button within reach and nurse Dudley notified.    Rehab Prognosis: Good; patient would benefit from acute skilled PT services to address these deficits and reach maximum level of function.    Recent Surgery: * No surgery found *      Plan:     During this hospitalization, patient to be seen 6 x/week to address the identified rehab impairments via gait training, therapeutic activities, therapeutic exercises and progress toward the following goals:    · Plan of Care Expires:  04/24/19    Subjective     Chief Complaint: tired  Patient/Family Comments/goals: none  Pain/Comfort:  · Pain Rating 1: 0/10    Patients cultural, spiritual, Congregational conflicts given the current situation: no    Living Environment:  Pt lives in a one story home with her grandson and no steps to enter.  Prior to admission, patients level  of function was pt needed assistance with ADLs and reports she stays in bed for most of the day. Pt able to ambulate down hallway in house but also reports frequent falls; having had 8 falls in the past year.  Equipment used at home: rollator.  DME owned (not currently used): none.  Upon discharge, patient will have assistance from grandson who is disabled.    Objective:     Communicated with nurse Rojas prior to session.  Patient found supine with telemetry, peripheral IV  upon PT entry to room.    General Precautions: Standard, fall   Orthopedic Precautions:N/A   Braces: N/A     Exams:  · Cognitive Exam:  Patient is oriented to Person, Place, Time and Situation  · Postural Exam:  Patient presented with the following abnormalities:    · -       Rounded shoulders  · -       Forward head  · -       Posterior pelvic tilt  · RUE Strength: WFL  · LUE Strength: WFL  · RLE Strength: WFL  · LLE Strength: WFL    Functional Mobility:  · Bed Mobility:     · Rolling Left:  supervision  · Scooting: supervision  · Supine to Sit: supervision  · Sit to Supine: contact guard assistance  · Transfers:     · Sit to Stand:  minimum assistance with rolling walker  · Gait: Pt ambulated 120 feet with slowed, shuffling gait pattern with CGA and RW  · Balance: fair    AM-PAC 6 CLICK MOBILITY  Total Score:20     Patient left supine with all lines intact, call button in reach and nurse Octavia notified.    GOALS:   Multidisciplinary Problems     Physical Therapy Goals        Problem: Physical Therapy Goal    Goal Priority Disciplines Outcome Goal Variances Interventions   Physical Therapy Goal     PT, PT/OT Ongoing (interventions implemented as appropriate)     Description:  Goals to be met by: 19     Patient will increase functional independence with mobility by performin. Supine to sit with [place]  2. Sit to supine with [place]  3. Sit to stand transfer with Modified [place] using Rolling Walker  4. Gait  x 250  feet with Modified [place] using Rolling Walker.   5. Stand for 10 minutes with Modified [place] using Rolling Walker  6. Lower extremity exercise program x10-20 reps per handout, with independence                      History:     Past Medical History:   Diagnosis Date    Depression     Nervous breakdown     Renal disorder        Past Surgical History:   Procedure Laterality Date    APPENDECTOMY      FOOT SURGERY      SHOULDER SURGERY Left     TUBAL LIGATION         Time Tracking:     PT Received On: 04/10/19  PT Start Time: 1401     PT Stop Time: 1430  PT Total Time (min): 29 min     Billable Minutes: Evaluation 14 and Gait Training 15      Sushil Sorenson, DEPE  04/10/2019

## 2019-04-11 VITALS
TEMPERATURE: 97 F | OXYGEN SATURATION: 97 % | RESPIRATION RATE: 20 BRPM | BODY MASS INDEX: 23.64 KG/M2 | HEIGHT: 68 IN | WEIGHT: 156 LBS | HEART RATE: 71 BPM | SYSTOLIC BLOOD PRESSURE: 149 MMHG | DIASTOLIC BLOOD PRESSURE: 69 MMHG

## 2019-04-11 LAB
ALBUMIN SERPL BCP-MCNC: 3 G/DL (ref 3.5–5.2)
ALP SERPL-CCNC: 43 U/L (ref 55–135)
ALT SERPL W/O P-5'-P-CCNC: 9 U/L (ref 10–44)
ANION GAP SERPL CALC-SCNC: 9 MMOL/L (ref 8–16)
AST SERPL-CCNC: 15 U/L (ref 10–40)
BASOPHILS # BLD AUTO: 0 K/UL (ref 0–0.2)
BASOPHILS NFR BLD: 0.5 % (ref 0–1.9)
BILIRUB SERPL-MCNC: 0.2 MG/DL (ref 0.1–1)
BUN SERPL-MCNC: 10 MG/DL (ref 8–23)
CALCIUM SERPL-MCNC: 8.9 MG/DL (ref 8.7–10.5)
CHLORIDE SERPL-SCNC: 110 MMOL/L (ref 95–110)
CO2 SERPL-SCNC: 23 MMOL/L (ref 23–29)
CREAT SERPL-MCNC: 0.9 MG/DL (ref 0.5–1.4)
DIFFERENTIAL METHOD: ABNORMAL
EOSINOPHIL # BLD AUTO: 0.2 K/UL (ref 0–0.5)
EOSINOPHIL NFR BLD: 2.9 % (ref 0–8)
ERYTHROCYTE [DISTWIDTH] IN BLOOD BY AUTOMATED COUNT: 14.3 % (ref 11.5–14.5)
EST. GFR  (AFRICAN AMERICAN): >60 ML/MIN/1.73 M^2
EST. GFR  (NON AFRICAN AMERICAN): >60 ML/MIN/1.73 M^2
GLUCOSE SERPL-MCNC: 98 MG/DL (ref 70–110)
HCT VFR BLD AUTO: 34.6 % (ref 37–48.5)
HGB BLD-MCNC: 11.6 G/DL (ref 12–16)
LYMPHOCYTES # BLD AUTO: 2.7 K/UL (ref 1–4.8)
LYMPHOCYTES NFR BLD: 41.7 % (ref 18–48)
MAGNESIUM SERPL-MCNC: 2.1 MG/DL (ref 1.6–2.6)
MCH RBC QN AUTO: 30.3 PG (ref 27–31)
MCHC RBC AUTO-ENTMCNC: 33.5 G/DL (ref 32–36)
MCV RBC AUTO: 90 FL (ref 82–98)
MONOCYTES # BLD AUTO: 0.5 K/UL (ref 0.3–1)
MONOCYTES NFR BLD: 8.1 % (ref 4–15)
NEUTROPHILS # BLD AUTO: 3 K/UL (ref 1.8–7.7)
NEUTROPHILS NFR BLD: 46.8 % (ref 38–73)
PHOSPHATE SERPL-MCNC: 3.1 MG/DL (ref 2.7–4.5)
PLATELET # BLD AUTO: 172 K/UL (ref 150–350)
PMV BLD AUTO: 8.7 FL (ref 9.2–12.9)
POCT GLUCOSE: 106 MG/DL (ref 70–110)
POTASSIUM SERPL-SCNC: 3.6 MMOL/L (ref 3.5–5.1)
PROT SERPL-MCNC: 6 G/DL (ref 6–8.4)
RBC # BLD AUTO: 3.83 M/UL (ref 4–5.4)
SODIUM SERPL-SCNC: 142 MMOL/L (ref 136–145)
WBC # BLD AUTO: 6.4 K/UL (ref 3.9–12.7)

## 2019-04-11 PROCEDURE — G0378 HOSPITAL OBSERVATION PER HR: HCPCS

## 2019-04-11 PROCEDURE — 80053 COMPREHEN METABOLIC PANEL: CPT

## 2019-04-11 PROCEDURE — 25000003 PHARM REV CODE 250: Performed by: NURSE PRACTITIONER

## 2019-04-11 PROCEDURE — 83735 ASSAY OF MAGNESIUM: CPT

## 2019-04-11 PROCEDURE — 85025 COMPLETE CBC W/AUTO DIFF WBC: CPT

## 2019-04-11 PROCEDURE — 95816 EEG AWAKE AND DROWSY: CPT

## 2019-04-11 PROCEDURE — 25000003 PHARM REV CODE 250: Performed by: INTERNAL MEDICINE

## 2019-04-11 PROCEDURE — 84100 ASSAY OF PHOSPHORUS: CPT

## 2019-04-11 PROCEDURE — 36415 COLL VENOUS BLD VENIPUNCTURE: CPT

## 2019-04-11 RX ORDER — CIPROFLOXACIN 250 MG/1
250 TABLET, FILM COATED ORAL 2 TIMES DAILY
Qty: 6 TABLET | Refills: 0 | Status: SHIPPED | OUTPATIENT
Start: 2019-04-11 | End: 2019-04-14

## 2019-04-11 RX ORDER — CIPROFLOXACIN 500 MG/1
500 TABLET ORAL EVERY 12 HOURS
Status: DISCONTINUED | OUTPATIENT
Start: 2019-04-11 | End: 2019-04-11 | Stop reason: SDUPTHER

## 2019-04-11 RX ORDER — PREGABALIN 150 MG/1
150 CAPSULE ORAL 2 TIMES DAILY
Qty: 60 CAPSULE | Refills: 0 | Status: SHIPPED | OUTPATIENT
Start: 2019-04-11 | End: 2019-07-19 | Stop reason: CLARIF

## 2019-04-11 RX ADMIN — PANTOPRAZOLE SODIUM 40 MG: 40 TABLET, DELAYED RELEASE ORAL at 09:04

## 2019-04-11 RX ADMIN — VORTIOXETINE 20 MG: 20 TABLET, FILM COATED ORAL at 09:04

## 2019-04-11 RX ADMIN — PREGABALIN 150 MG: 75 CAPSULE ORAL at 09:04

## 2019-04-11 RX ADMIN — DULOXETINE 30 MG: 30 CAPSULE, DELAYED RELEASE ORAL at 09:04

## 2019-04-11 RX ADMIN — FLUTICASONE PROPIONATE 100 MCG: 50 SPRAY, METERED NASAL at 09:04

## 2019-04-11 RX ADMIN — SODIUM CHLORIDE: 0.9 INJECTION, SOLUTION INTRAVENOUS at 04:04

## 2019-04-11 NOTE — PLAN OF CARE
"Problem: Adult Inpatient Plan of Care  Goal: Plan of Care Review  Medications reviewed and administered  Call light within reach  Bed low//wheels locked  SR up x 2  Up with assist  Sg at , states patient "will not ever keep her follow up appointments"  Explained importance of follow up.   Safety maintained  Discharged to home with sg.  NAD noted      "

## 2019-04-11 NOTE — PLAN OF CARE
04/11/19 1523   Final Note   Assessment Type Final Discharge Note   Anticipated Discharge Disposition Home-Health  ([facility])   CM made hospital followup appointments with Brittny Alexander and Della and added to avs.

## 2019-04-11 NOTE — PLAN OF CARE
Problem: Adult Inpatient Plan of Care  Goal: Plan of Care Review  Patient oriented to person and place.  Up with assist to bathroom.  Diabetes management.  Sinus rhythm.  Sitter at bedside for patient's safety.  Bed alarm also set for patient's safety.  Call light in reach.  Bed in low/locked position.

## 2019-04-11 NOTE — PROGRESS NOTES
[facility]  Neurology  Progress Note    Patient Name: Queta Ocampo  MRN: [id number]  Admission Date: 4/8/2019  Hospital Length of Stay: 0 days  Code Status: Full Code   Attending Provider: Mulu Joseph MD  Primary Care Physician: Jean Alexander MD   Principal Problem:Orthostatic dizziness    Subjective:     Chief Complaint:  dizziness     HPI: Queta Ocampo is a 70 your female with PMHx significant for peripheral neuropathy and depression.  She presented to the ED with a 4 day onset of postural dizziness associated with falls, possible LOC, SOB, and mild HA.  She reported that 4 days ago she fell at home and laid on the floor for 7-1/2 hours before family could come get her.  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  According to her , she has been recently started on diazepam 5 mg and her last Rx for Percocet was prescribed in August of 2018.  In the ED, workup revealed postural hypotension.  She denied fever, cough, chest pain, leg swelling, abdominal pain, urinary symptoms, nausea, vomiting, and diarrhea.  She reports that she did have passing out/LOC with the dizziness as well as shakiness but denies any incontinence or biting her tongue.    Interval hx: Pt seen & examined. Nursing states that she has not had any further orthostatic hypotension. Pt has ambulated in her room. She is eager to go home.      CRT: 0.9  A1c: 6.1   B12, folate, TSH: WNL     Brain imaging:  CT head:   1. No acute intracranial abnormality appreciated.  No interval detrimental change in the imaging appearance of the brain.  2. Mucosal retention cyst formation within the right posterior ethmoid air cells.    MRI: No evidence of acute infarct, mass effect or abnormal extra-axial collection.  Moderate pontine and periventricular white matter signal alteration unchanged  relative to December 4, 2017 suggesting microvascular changes    MRA: Intracranial atherosclerosis without evidence of aneurysm.    Neck Imaging:  CUS:   No evidence of a hemodynamically significant carotid bifurcation stenosis.     Cardiac imaging:  ECHO:  · Concentric left ventricular remodeling.  · Normal left ventricular systolic function. The estimated ejection fraction is 56%  · Grade I (mild) left ventricular diastolic dysfunction consistent with impaired relaxation.  · Normal left atrial pressure.  · Normal right ventricular systolic function.  · Possible Mild aortic valve stenosis. Valve leaflets were not well visualized  · Aortic valve area is 1.84 cm2; peak velocity is 2.66 m/s; mean gradient is 15.27 mmHg.  · Mild tricuspid regurgitation.  · Normal central venous pressure (3 mm Hg).  · The estimated PA systolic pressure is 23 mm Hg    EEG: pending      Current Neurological Medications: see MAR    Current Facility-Administered Medications   Medication Dose Route Frequency Provider Last Rate Last Dose    0.9%  NaCl infusion   Intravenous Continuous Lisbeth Mc  mL/hr at 04/11/19 0406      acetaminophen tablet 1,000 mg  1,000 mg Oral Q6H PRN Lisbeth Mc NP   1,000 mg at 04/09/19 1231    ciprofloxacin (CIPRO)400mg/200ml D5W IVPB 400 mg  400 mg Intravenous Q12H Mulu Joseph  mL/hr at 04/10/19 2225 400 mg at 04/10/19 2225    dextrose 50% injection 12.5 g  12.5 g Intravenous PRN Lisbeth Mc NP        dextrose 50% injection 25 g  25 g Intravenous PRN Lisbeth Mc NP        DULoxetine DR capsule 30 mg  30 mg Oral Daily Lisbeth Mc NP   30 mg at 04/11/19 0957    enoxaparin injection 40 mg  40 mg Subcutaneous Daily Lisbeth Mc NP   40 mg at 04/10/19 1645    fluticasone 50 mcg/actuation nasal spray 100 mcg  2 spray Each Nare Daily Mulu Joseph MD   100 mcg at 04/11/19 0957    glucagon (human recombinant) injection 1 mg  1 mg Intramuscular PRN Lisbeth NUNEZ  BRUCE Mc        glucose chewable tablet 16 g  16 g Oral PRN Lisbeth Mc NP        glucose chewable tablet 24 g  24 g Oral PRN Lisbeth Mc NP        magnesium oxide tablet 800 mg  800 mg Oral PRN Lisbeth Mc NP        magnesium oxide tablet 800 mg  800 mg Oral PRN Lisbeth Mc NP        nicotine 14 mg/24 hr 1 patch  1 patch Transdermal Daily Mulu Joseph MD        ondansetron injection 8 mg  8 mg Intravenous Q8H PRN Lisbeth Mc NP        pantoprazole EC tablet 40 mg  40 mg Oral Daily Lisbeth MAYRA Mc NP   40 mg at 04/11/19 0956    potassium chloride 10% oral solution 40 mEq  40 mEq Oral PRN Lisbeth Mc NP        potassium chloride 10% oral solution 60 mEq  60 mEq Oral PRN Lisbeth Mc NP        pregabalin capsule 150 mg  150 mg Oral BID Mulu Joseph MD   150 mg at 04/11/19 0956    senna-docusate 8.6-50 mg per tablet 1 tablet  1 tablet Oral BID PRN Lisbeth Mc NP        sodium chloride 0.9% flush 10 mL  10 mL Intravenous PRN Lisbeth Mc NP        vortioxetine Tab 20 mg  20 mg Oral Daily Lisbeth Mc NP   20 mg at 04/11/19 0958       Review of Systems   Constitutional: Negative for activity change, appetite change, chills and fever.   HENT: Negative for congestion, rhinorrhea, sneezing, sore throat and tinnitus.    Eyes: Negative for photophobia, discharge and visual disturbance.   Respiratory: Negative for cough, shortness of breath and wheezing.    Cardiovascular: Negative for chest pain, palpitations and leg swelling.   Gastrointestinal: Negative for abdominal distention, abdominal pain, constipation, diarrhea, nausea and vomiting.   Endocrine: Negative for cold intolerance and heat intolerance.   Genitourinary: Negative for dysuria, flank pain, frequency, hematuria and urgency.   Musculoskeletal: Negative for arthralgias, gait problem, joint swelling, neck pain and neck stiffness.   Skin: Negative for color change and rash.    Allergic/Immunologic: Negative for immunocompromised state.   Neurological: Positive for weakness and numbness. Negative for dizziness, tremors, seizures, syncope, facial asymmetry, speech difficulty, light-headedness and headaches.   Hematological: Does not bruise/bleed easily.   Psychiatric/Behavioral: Negative for behavioral problems and hallucinations.     Objective:     Vital Signs (Most Recent):  Temp: 97.1 °F (36.2 °C) (04/11/19 0739)  Pulse: 79 (04/11/19 1011)  Resp: 17 (04/11/19 0739)  BP: (!) 146/67 (04/11/19 1011)  SpO2: 97 % (04/11/19 1011) Vital Signs (24h Range):  Temp:  [96.1 °F (35.6 °C)-97.9 °F (36.6 °C)] 97.1 °F (36.2 °C)  Pulse:  [72-84] 79  Resp:  [14-18] 17  SpO2:  [94 %-97 %] 97 %  BP: (114-165)/(56-72) 146/67     Weight: 70.8 kg (156 lb)  Body mass index is 23.72 kg/m².    Physical Exam   Constitutional: She is oriented to person, place, and time. She appears well-developed and well-nourished.   HENT:   Head: Normocephalic and atraumatic.   Eyes: Pupils are equal, round, and reactive to light. Conjunctivae and EOM are normal.   Neck: Normal range of motion. Neck supple.   Cardiovascular: Normal rate, regular rhythm and normal heart sounds.   Pulmonary/Chest: Breath sounds normal.   Abdominal: Soft. Bowel sounds are normal.   Musculoskeletal: Normal range of motion.   Neurological: She is alert and oriented to person, place, and time. She has a normal Finger-Nose-Finger Test.   Reflex Scores:       Tricep reflexes are 2+ on the right side and 2+ on the left side.       Bicep reflexes are 2+ on the right side and 2+ on the left side.       Brachioradialis reflexes are 2+ on the right side and 2+ on the left side.       Patellar reflexes are 2+ on the right side and 2+ on the left side.       Achilles reflexes are 2+ on the right side and 2+ on the left side.  Skin: Skin is warm and dry.   Psychiatric: Her behavior is normal.       NEUROLOGICAL EXAMINATION:     MENTAL STATUS   Oriented to  person, place, and time.   Attention: normal.   Level of consciousness: alert    CRANIAL NERVES   Cranial nerves II through XII intact.     CN III, IV, VI   Pupils are equal, round, and reactive to light.  Extraocular motions are normal.     MOTOR EXAM     Strength   Right neck flexion: 4/5  Left neck flexion: 4/5  Right neck extension: 4/5  Left neck extension: 4/5  Right deltoid: 4/5  Left deltoid: 4/5  Right biceps: 4/5  Left biceps: 4/5  Right triceps: 4/5  Left triceps: 4/5  Right wrist flexion: 4/5  Left wrist flexion: 4/5  Right wrist extension: 4/5  Left wrist extension: 4/5  Right interossei: 4/5  Left interossei: 4/5  Right abdominals: 4/5  Left abdominals: 4/5  Right iliopsoas: 4/5  Left iliopsoas: 4/5  Right quadriceps: 4/5  Left quadriceps: 4/5  Right hamstrin/5  Left hamstrin/5  Right glutei: 4/5  Left glutei: 4/5  Right anterior tibial: 4/5  Left anterior tibial: 4/5  Right posterior tibial: 4/5  Left posterior tibial: 4/5  Right peroneal: 4/5  Left peroneal: 4/5  Right gastroc: 4/5  Left gastroc: 4/5    REFLEXES     Reflexes   Right brachioradialis: 2+  Left brachioradialis: 2+  Right biceps: 2+  Left biceps: 2+  Right triceps: 2+  Left triceps: 2+  Right patellar: 2+  Left patellar: 2+  Right achilles: 2+  Left achilles: 2+  Right plantar: normal  Left plantar: normal    SENSORY EXAM   Light touch normal.        Pt reports baseline tingling sensations bilateral feet     GAIT AND COORDINATION      Coordination   Finger to nose coordination: normal    Tremor   Resting tremor: absent  Intention tremor: absent  Action tremor: absent      Significant Labs: All pertinent lab results from the past 24 hours have been reviewed.    Significant Imaging: I have reviewed and interpreted all pertinent imaging results/findings within the past 24 hours.    Assessment and Plan:     Dizziness  Positive orthostatics upon admission   MRI/MRA/CUS unremarkable   EEG pending - no hx of sz or sz activity while  here  PT/OT - would benefit from op therapy     Peripheral neuropathy  Chronic problems  Recently stopped Lyrica and treating with Percocet  Recommend to avoid narcotics and restart Lyrica at discretion of HM  Recommend outpatient EMG/NCS  Lab workup unremarkable    Neurologically stable for d/c pending final review of EEG.     Stroke education was provided.  If patient has acute neurological changes including weakness, confusion, speech changes, facial droop, difficulty walking, and sensory changes immediately call 911.    Follow up Neurology in 2 weeks at [phone].        Active Diagnoses:    Diagnosis Date Noted POA    PRINCIPAL PROBLEM:  Orthostatic dizziness [R42] 04/08/2019 Yes    Depression [F32.9] 04/09/2019 Yes    Peripheral neuropathy [G62.9] 04/09/2019 Yes      Problems Resolved During this Admission:       VTE Risk Mitigation (From admission, onward)        Ordered     enoxaparin injection 40 mg  Daily      04/09/19 0014     IP VTE HIGH RISK PATIENT  Once      04/09/19 0014          Laila Cummings NP  Neurology  [facility]    I, Dr. Enoch Clark, discussed care with my advanced practitioner and agree with above. I have reviewed patient clinical presentation, work up, impression and plan.

## 2019-04-11 NOTE — PT/OT/SLP PROGRESS
Physical Therapy      Patient Name:  Queta Ocampo   MRN:  [id number]    Patient not seen today secondary to discharge from the hospital.    Sushil Sorenson, SPT

## 2019-04-11 NOTE — PLAN OF CARE
Pts HH orders ,Facesheet, H&P, and AVS sent to [facility] via [facility]. Pts AVS updated.  Padmini Rivera LMSW     04/11/19 1138   Post-Acute Status   Post-Acute Authorization Home Health/Hospice   Home Health/Hospice Status Referrals Sent

## 2019-04-11 NOTE — PLAN OF CARE
Pt is sating 97% on RA. No distress shown. Will continue to monitor.      04/10/19 2043   Patient Assessment/Suction   Level of Consciousness (AVPU) alert   Respiratory Effort Unlabored   PRE-TX-O2   O2 Device (Oxygen Therapy) room air   SpO2 97 %   Pulse Oximetry Type Intermittent   $ Pulse Oximetry - Multiple Charge Pulse Oximetry - Multiple   Pulse 78   Resp 18

## 2019-04-11 NOTE — DISCHARGE SUMMARY
Discharge Summary  Hospital Medicine    Admit Date: 4/8/2019    Date and Time: 4/11/20199:09 AM    Discharge Attending Physician: Mulu Joseph MD    Primary Care Physician: Jean Alexander MD    Diagnoses:  Active Hospital Problems    Diagnosis  POA    *Orthostatic dizziness [R42] - resolved  Yes    Depression [F32.9]  Yes    Peripheral neuropathy [G62.9]  Urinary tract infection  Yes     Discharged Condition: Good    Hospital Course:   Queta Ocampo is a 70 your female with PMHx significant for peripheral neuropathy and depression.  She presented to the ED with a 4 day onset of postural dizziness associated with falls, possible LOC, SOB, and mild HA.  She reported that 4 days ago she fell at home and laid on the floor for 7-1/2 hours before family could come get her.  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  According to her , she had been recently started on diazepam 5 mg and her last Rx for Percocet was prescribed in August of 2018.  In the ED, workup revealed postural hypotension.  She denied fever, cough, chest pain, leg swelling, abdominal pain, urinary symptoms, nausea, vomiting, and diarrhea.  She will be admitted to Hospital Medicine for further evaluation. Patient was admitted to Hospitalist medicine service. Patient was evaluated by Dr. Marie.  Patient underwent neurological workup as mentioned below.  Patient was treated for urinary tract infection with intravenous antibiotics.  Urine culture results remained negative.  Patient was extensively counseled regarding dangers of polypharmacy.  Patient was advised to avoid concomitant use of gabapentin to Lyrica.  Patient will avoid using Valium in addition to other psychotropic medications.  Patient was strongly encouraged to avoid Percocet/narcotic use.  Patient's daughter was counseled regarding  polypharmacy and dangers of side effects related to above medications.  Patient to continue using walker for ambulation and observe fall precautions.  Home health and home physical therapy has been arranged for closer monitoring and further management.  EEG results are pending, patient has been cleared for discharge by Neurology team.  Patient will continue close follow-up with Dr. Marie. Patient was discharged home in stable condition with following discharge plan of care.     Consults: Dr. Marie    Significant Diagnostic Studies:   CT head without contrast:  1. No acute intracranial abnormality appreciated.  No interval detrimental change in the imaging appearance of the brain.  2. Mucosal retention cyst formation within the right posterior ethmoid air cells.     Chest x-ray: No acute process.  No significant change.     Bilateral carotid ultrasound: No evidence of a hemodynamically significant carotid bifurcation stenosis.     MRA of brain:   Intracranial atherosclerosis without evidence of aneurysm.     MRI of brain :   No evidence of acute infarct, mass effect or abnormal extra-axial collection. Moderate pontine and periventricular white matter signal alteration unchanged relative to December 4, 2017 suggesting microvascular changes.    Microbiology Results (last 7 days)     ** No results found for the last 168 hours. **        Special Treatments/Procedures: None  Disposition: Home or Self Care    Medications:  Reconciled Home Medications:   Current Discharge Medication List      START taking these medications    Details   ciprofloxacin HCl (CIPRO) 250 MG tablet Take 1 tablet (250 mg total) by mouth 2 (two) times daily. for 3 days  Qty: 6 tablet, Refills: 0         CONTINUE these medications which have CHANGED    Details   pregabalin (LYRICA) 150 MG capsule Take 1 capsule (150 mg total) by mouth 2 (two) times daily.  Qty: 60 capsule, Refills: 0         CONTINUE these medications which have NOT CHANGED     Details   busPIRone (BUSPAR) 10 MG tablet Take 10 mg by mouth 3 (three) times daily.      DULoxetine (CYMBALTA) 30 MG capsule Take 30 mg by mouth once daily.       mirtazapine (REMERON) 45 MG tablet Take 45 mg by mouth every evening.       vortioxetine 20 mg Tab Take 20 mg by mouth once daily.       albuterol (PROVENTIL/VENTOLIN HFA) 90 mcg/actuation inhaler Inhale 1-2 puffs into the lungs every 6 (six) hours as needed for Wheezing. Rescue  Qty: 1 Inhaler, Refills: 0           Discharge Procedure Orders   Referral to Home health   Referral Priority: Routine Referral Type: Home Health   Referral Reason: Specialty Services Required   Requested Specialty: Home Health Services   Number of Visits Requested: 1     Diet Adult Regular   Order Comments: Boost Plus can with meals     Other restrictions (specify):   Order Comments: PLEASE OBSERVE FALL PRECAUTIONS     Call MD for:   Order Comments: For worsening symptoms, chest pain, shortness of breath, increased abdominal pain, high grade fever, stroke or stroke like symptoms, immediately go to the nearest Emergency Room or call 911 as soon as possible.     Follow-up Information     Jean Alexander MD In 1 week.    Specialty:  Family Medicine  Contact information:  [address] [facility]  [place] [id number] [phone]             Júnior Clark MD In 2 weeks.    Specialties:  Vascular Neurology, Neurology  Contact information:  [address]  [place] [facility] [address]  [phone]             Please follow up.    Contact information:  Keep yourself well hydrated.  Please do not smoke cigarettes.  Please avoid Percocet like narcotic medications.  Do not take gabapentin while you are on Lyrica.  Watch for sedation and observe fall precautions.               Time spent on the discharge of patient: 32 minutes  Patient was seen and examined on the date of discharge and determined to be suitable for  discharge.

## 2019-04-11 NOTE — PLAN OF CARE
· 4/11/2019 2:49:06 PM Completed  Padmini Rivera  · 4/11/2019 2:35:04 PM Accepted  [email or web address]  · 4/11/2019 11:38:37 AM New: Pt is discharging home today. Thanks !  Padmini Rivera    The pt is accepted by [facility]. Pts discharge destination booked in [facility]. Padmini Rivera, SARAHSW     04/11/19 1458   Post-Acute Status   Post-Acute Authorization Home Health/Hospice   Home Health/Hospice Status Set-up Complete

## 2019-04-12 LAB — VIT B1 BLD-MCNC: 44 UG/L (ref 38–122)

## 2019-05-22 NOTE — PROCEDURES
DATE OF SERVICE:  04/10/2019    EEG NUMBER:  [id number].    REQUESTED BY:  Dr. Enoch Clark.    ELECTROENCEPHALOGRAM REPORT    METHODOLOGY:  Electroencephalographic (EEG) recording is recorded with   electrodes placed according to the International 10-20 placement system.  Thirty   two (32) channels of digital signal (sampling rate of 512/sec), including T1   and T2, were simultaneously recorded from the scalp and may include EKG, EMG,   and/or eye monitors.  Recording band pass was 0.1 to 512 Hz.  Digital video   recording of the patient is simultaneously recorded with the EEG.  The patient   is instructed to report clinical symptoms which may occur during the recording   session.  EEG and video recording are stored and archived in digital format.    Activation procedures, which include photic stimulation, hyperventilation and   instructing patients to perform simple tasks, are done in selected patients.    The EEG is displayed on a monitor screen and can be reviewed using different   montages.  Computer assisted-analysis is employed to detect spike and   electrographic seizure activity.  The entire record is submitted for computer   analysis.  The entire recording is visually reviewed, and the times identified   by computer analysis as being spikes or seizures are reviewed again.    Compressed spectral analysis (CSA) is also performed on the activity recorded   from each individual channel.  This is displayed as a power display of   frequencies from 0 to 30 Hz over time.  The CSA is reviewed looking for   asymmetries in power between homologous areas of the scalp, then compared with   the original EEG recording.    [employer] software was also utilized in the review of this study.  This software   suite analyzes the EEG recording in multiple domains.  Coherence and rhythmicity   are computed to identify EEG sections which may contain organized seizures.    Each channel undergoes analysis to detect the presence of spike  and sharp waves   which have special and morphological characteristics of epileptic activity.  The   routine EEG recording is converted from special into frequency domain.  This is   then displayed comparing homologous areas to identify areas of significant   asymmetry.  Algorithm to identify non-cortically generated artifact is used to   separate artifact from the EEG.    EEG FINDINGS:  The patient was awake at the onset of the tracing.  Background   consisted of a somewhat irregular 9 to 10 Hz posterior dominant rhythm seen in   the occipital, parietal, and posterior temporal regions with some spread at   times into the central area.  A mixture of low and mid range beta was seen   diffusely.  The posterior dominant rhythm occasionally attenuated, but sleep was   not recorded.  Activation procedures included intermittent photic stimulation,   which did not significantly alter the tracing.  Hyperventilation was then   carried out for 3 minutes, which also did not affect the recording.    IMPRESSION:  Normal waking and drowsy EEG.    CLINICAL CORRELATION:  The patient is a 74-year-old female who is having   frequent falls with loss of consciousness on few occasions.  This tracing is   unremarkable and shows no evidence of cortical dysfunction.  There is also no   evidence for an irritative process.  EKG was recorded and showed a normal sinus   rhythm.      RR/IN  dd: 05/22/2019 17:09:17 (CDT)  td: 05/22/2019 17:21:49 (CDT)  Doc ID   #[id number]  Job ID #[id number]    CC:

## 2019-06-08 ENCOUNTER — HOSPITAL ENCOUNTER (EMERGENCY)
Facility: HOSPITAL | Age: 74
Discharge: HOME | End: 2019-06-08
Attending: EMERGENCY MEDICINE
Payer: MEDICARE

## 2019-06-08 VITALS
RESPIRATION RATE: 16 BRPM | OXYGEN SATURATION: 96 % | WEIGHT: 145 LBS | BODY MASS INDEX: 21.98 KG/M2 | HEIGHT: 68 IN | TEMPERATURE: 98 F | SYSTOLIC BLOOD PRESSURE: 124 MMHG | HEART RATE: 70 BPM | DIASTOLIC BLOOD PRESSURE: 60 MMHG

## 2019-06-08 DIAGNOSIS — M51.37 DEGENERATIVE DISC DISEASE AT L5-S1 LEVEL: Primary | ICD-10-CM

## 2019-06-08 PROCEDURE — 63600175 PHARM REV CODE 636 W HCPCS: Performed by: EMERGENCY MEDICINE

## 2019-06-08 PROCEDURE — 25000003 PHARM REV CODE 250: Performed by: EMERGENCY MEDICINE

## 2019-06-08 PROCEDURE — 99284 EMERGENCY DEPT VISIT MOD MDM: CPT

## 2019-06-08 RX ORDER — OXYCODONE HYDROCHLORIDE 5 MG/1
10 TABLET ORAL
Status: COMPLETED | OUTPATIENT
Start: 2019-06-08 | End: 2019-06-08

## 2019-06-08 RX ORDER — TRAMADOL HYDROCHLORIDE 50 MG/1
50 TABLET ORAL EVERY 6 HOURS PRN
Qty: 20 TABLET | Refills: 0 | Status: SHIPPED | OUTPATIENT
Start: 2019-06-08 | End: 2019-06-18

## 2019-06-08 RX ORDER — PREDNISONE 20 MG/1
20 TABLET ORAL 2 TIMES DAILY
Qty: 10 TABLET | Refills: 0 | Status: SHIPPED | OUTPATIENT
Start: 2019-06-08 | End: 2019-06-13

## 2019-06-08 RX ORDER — PREDNISONE 20 MG/1
60 TABLET ORAL
Status: COMPLETED | OUTPATIENT
Start: 2019-06-08 | End: 2019-06-08

## 2019-06-08 RX ADMIN — PREDNISONE 60 MG: 20 TABLET ORAL at 09:06

## 2019-06-08 RX ADMIN — OXYCODONE HYDROCHLORIDE 10 MG: 5 TABLET ORAL at 09:06

## 2019-06-09 NOTE — ED PROVIDER NOTES
Encounter Date: 6/8/2019    SCRIBE #1 NOTE: I, Calra Kimball, am scribing for, and in the presence of, Dr. Butler.       History     Chief Complaint   Patient presents with    Back Pain     X 1 week worsening to today       Time seen by provider: 9:40 PM on 06/08/2019    The patient is a 74 y.o. female who presents to the ED with complaint of lower back pain for 1 week that worsened today. The pain is worse with movement. Denies fever, night sweats, chills, numbness, weakness, abdominal pain, bowel or bladder incontinence or any other symptoms at this time. PMHx of depression and renal disorder. PSHx of appendectomy.     The history is provided by the patient and a relative.     Review of patient's allergies indicates:   Allergen Reactions    Codeine Itching, Swelling, Rash, Other (See Comments) and Nausea And Vomiting     Facial swelling, itching, rash, erythema    Pcn [penicillins] Itching, Swelling, Rash and Other (See Comments)     Facial swelling with rash, erythema, itching     Past Medical History:   Diagnosis Date    Depression     Nervous breakdown     Renal disorder      Past Surgical History:   Procedure Laterality Date    APPENDECTOMY      FOOT SURGERY      SHOULDER SURGERY Left     TUBAL LIGATION       History reviewed. No pertinent family history.  Social History     Tobacco Use    Smoking status: Current Every Day Smoker     Packs/day: 0.50     Types: Cigarettes    Smokeless tobacco: Never Used   Substance Use Topics    Alcohol use: Not Currently    Drug use: Not Currently     Review of Systems   Constitutional: Negative for activity change, appetite change, chills, fatigue and fever.   Eyes: Negative for visual disturbance.   Respiratory: Negative for apnea and shortness of breath.    Cardiovascular: Negative for chest pain and palpitations.   Gastrointestinal: Negative for abdominal distention and abdominal pain.   Genitourinary: Negative for difficulty urinating.   Musculoskeletal:  Positive for back pain. Negative for neck pain.   Skin: Negative for pallor and rash.   Neurological: Negative for headaches.   Hematological: Does not bruise/bleed easily.   Psychiatric/Behavioral: Negative for agitation.       Physical Exam     Initial Vitals [06/08/19 2130]   BP Pulse Resp Temp SpO2   124/60 70 16 98.4 °F (36.9 °C) 96 %      MAP       --         Physical Exam    Nursing note and vitals reviewed.  Constitutional: She appears well-developed and well-nourished.   HENT:   Head: Normocephalic and atraumatic.   Eyes: Conjunctivae are normal.   Neck: Normal range of motion. Neck supple.   Cardiovascular: Normal rate, regular rhythm and normal heart sounds. Exam reveals no gallop and no friction rub.    No murmur heard.  Pulmonary/Chest: Effort normal and breath sounds normal. No respiratory distress. She has no wheezes. She has no rhonchi. She has no rales.   Abdominal: Soft. She exhibits no distension. There is no tenderness.   Musculoskeletal: Normal range of motion. She exhibits tenderness.   Left parasacral tenderness.   Neurological: She is alert and oriented to person, place, and time.   Negative straight leg raise bilaterally. Normal strength bilateral lower extremities.    Skin: Skin is warm and dry. No erythema.   Psychiatric: She has a normal mood and affect.         ED Course   Procedures  Labs Reviewed - No data to display       Imaging Results          X-Ray Lumbar Spine Ap And Lateral (In process)                  Medical Decision Making:   History:   Old Medical Records: I decided to obtain old medical records.  Clinical Tests:   Radiological Study: Ordered and Reviewed  ED Management:  74-year-old female presents with a one-week history of positional back pain.  She has no focal neurologic deficits.  She has no fever night sweats or chills with infectious process unlikely.  X-rays independently interpreted by me demonstrate marrying of the L5-S1 joint space.  She is given prednisone and  tramadol for pain and referred to Orthopedic surgery.       APC / Resident Notes:   I, Dr. Azar Butler III, personally performed the services described in this documentation. All medical record entries made by the scribe were at my direction and in my presence.  I have reviewed the chart and agree that the record reflects my personal performance and is accurate and complete       Scribe Attestation:   Scribe #1: I performed the above scribed service and the documentation accurately describes the services I performed. I attest to the accuracy of the note.               Clinical Impression:       ICD-10-CM ICD-9-CM   1. Degenerative disc disease at L5-S1 level M51.36 722.52         Disposition:   Disposition: Discharged  Condition: Stable                        Azar Butler III, MD  06/08/19 7214

## 2019-07-19 ENCOUNTER — HOSPITAL ENCOUNTER (EMERGENCY)
Facility: HOSPITAL | Age: 74
Discharge: HOME | End: 2019-07-19
Attending: EMERGENCY MEDICINE
Payer: MEDICARE

## 2019-07-19 VITALS
WEIGHT: 150 LBS | HEIGHT: 68 IN | BODY MASS INDEX: 22.73 KG/M2 | HEART RATE: 72 BPM | TEMPERATURE: 99 F | DIASTOLIC BLOOD PRESSURE: 67 MMHG | RESPIRATION RATE: 16 BRPM | SYSTOLIC BLOOD PRESSURE: 150 MMHG | OXYGEN SATURATION: 98 %

## 2019-07-19 DIAGNOSIS — R53.83 FATIGUE: Primary | ICD-10-CM

## 2019-07-19 DIAGNOSIS — N39.0 URINARY TRACT INFECTION WITHOUT HEMATURIA, SITE UNSPECIFIED: ICD-10-CM

## 2019-07-19 DIAGNOSIS — R53.1 WEAKNESS: ICD-10-CM

## 2019-07-19 LAB
ALBUMIN SERPL BCP-MCNC: 3.8 G/DL (ref 3.5–5.2)
ALP SERPL-CCNC: 69 U/L (ref 55–135)
ALT SERPL W/O P-5'-P-CCNC: 21 U/L (ref 10–44)
ANION GAP SERPL CALC-SCNC: 11 MMOL/L (ref 8–16)
AST SERPL-CCNC: 15 U/L (ref 10–40)
BACTERIA #/AREA URNS HPF: ABNORMAL /HPF
BASOPHILS # BLD AUTO: 0.02 K/UL (ref 0–0.2)
BASOPHILS NFR BLD: 0.1 % (ref 0–1.9)
BILIRUB SERPL-MCNC: 0.3 MG/DL (ref 0.1–1)
BILIRUB UR QL STRIP: NEGATIVE
BUN SERPL-MCNC: 36 MG/DL (ref 8–23)
CALCIUM SERPL-MCNC: 10.2 MG/DL (ref 8.7–10.5)
CHLORIDE SERPL-SCNC: 99 MMOL/L (ref 95–110)
CLARITY UR: ABNORMAL
CO2 SERPL-SCNC: 27 MMOL/L (ref 23–29)
COLOR UR: YELLOW
CREAT SERPL-MCNC: 1.3 MG/DL (ref 0.5–1.4)
DIFFERENTIAL METHOD: ABNORMAL
EOSINOPHIL # BLD AUTO: 0.2 K/UL (ref 0–0.5)
EOSINOPHIL NFR BLD: 1.3 % (ref 0–8)
ERYTHROCYTE [DISTWIDTH] IN BLOOD BY AUTOMATED COUNT: 13 % (ref 11.5–14.5)
EST. GFR  (AFRICAN AMERICAN): 47 ML/MIN/1.73 M^2
EST. GFR  (NON AFRICAN AMERICAN): 41 ML/MIN/1.73 M^2
GLUCOSE SERPL-MCNC: 169 MG/DL (ref 70–110)
GLUCOSE UR QL STRIP: NEGATIVE
HCT VFR BLD AUTO: 45.7 % (ref 37–48.5)
HGB BLD-MCNC: 14.9 G/DL (ref 12–16)
HGB UR QL STRIP: ABNORMAL
IMM GRANULOCYTES # BLD AUTO: 0.09 K/UL (ref 0–0.04)
KETONES UR QL STRIP: NEGATIVE
LEUKOCYTE ESTERASE UR QL STRIP: ABNORMAL
LYMPHOCYTES # BLD AUTO: 2.9 K/UL (ref 1–4.8)
LYMPHOCYTES NFR BLD: 20.4 % (ref 18–48)
MAGNESIUM SERPL-MCNC: 2 MG/DL (ref 1.6–2.6)
MCH RBC QN AUTO: 29.9 PG (ref 27–31)
MCHC RBC AUTO-ENTMCNC: 32.6 G/DL (ref 32–36)
MCV RBC AUTO: 92 FL (ref 82–98)
MICROSCOPIC COMMENT: ABNORMAL
MONOCYTES # BLD AUTO: 0.9 K/UL (ref 0.3–1)
MONOCYTES NFR BLD: 6.7 % (ref 4–15)
NEUTROPHILS # BLD AUTO: 9.9 K/UL (ref 1.8–7.7)
NEUTROPHILS NFR BLD: 70.9 % (ref 38–73)
NITRITE UR QL STRIP: NEGATIVE
NRBC BLD-RTO: 0 /100 WBC
PH UR STRIP: 6 [PH] (ref 5–8)
PHOSPHATE SERPL-MCNC: 4.5 MG/DL (ref 2.7–4.5)
PLATELET # BLD AUTO: 277 K/UL (ref 150–350)
PMV BLD AUTO: 10.4 FL (ref 9.2–12.9)
POTASSIUM SERPL-SCNC: 4.1 MMOL/L (ref 3.5–5.1)
PROT SERPL-MCNC: 7.5 G/DL (ref 6–8.4)
PROT UR QL STRIP: NEGATIVE
RBC # BLD AUTO: 4.98 M/UL (ref 4–5.4)
RBC #/AREA URNS HPF: 8 /HPF (ref 0–4)
SODIUM SERPL-SCNC: 137 MMOL/L (ref 136–145)
SP GR UR STRIP: 1.02 (ref 1–1.03)
SQUAMOUS #/AREA URNS HPF: 10 /HPF
TSH SERPL DL<=0.005 MIU/L-ACNC: 1.32 UIU/ML (ref 0.4–4)
URN SPEC COLLECT METH UR: ABNORMAL
UROBILINOGEN UR STRIP-ACNC: 1 EU/DL
WBC # BLD AUTO: 14.02 K/UL (ref 3.9–12.7)
WBC #/AREA URNS HPF: 53 /HPF (ref 0–5)

## 2019-07-19 PROCEDURE — 84443 ASSAY THYROID STIM HORMONE: CPT

## 2019-07-19 PROCEDURE — 81000 URINALYSIS NONAUTO W/SCOPE: CPT

## 2019-07-19 PROCEDURE — 87086 URINE CULTURE/COLONY COUNT: CPT

## 2019-07-19 PROCEDURE — 36415 COLL VENOUS BLD VENIPUNCTURE: CPT

## 2019-07-19 PROCEDURE — 84100 ASSAY OF PHOSPHORUS: CPT

## 2019-07-19 PROCEDURE — 85025 COMPLETE CBC W/AUTO DIFF WBC: CPT

## 2019-07-19 PROCEDURE — 25000003 PHARM REV CODE 250: Performed by: PHYSICIAN ASSISTANT

## 2019-07-19 PROCEDURE — 96360 HYDRATION IV INFUSION INIT: CPT

## 2019-07-19 PROCEDURE — 83735 ASSAY OF MAGNESIUM: CPT

## 2019-07-19 PROCEDURE — 99285 EMERGENCY DEPT VISIT HI MDM: CPT | Mod: 25

## 2019-07-19 PROCEDURE — 80053 COMPREHEN METABOLIC PANEL: CPT

## 2019-07-19 PROCEDURE — 93005 ELECTROCARDIOGRAM TRACING: CPT

## 2019-07-19 RX ORDER — NITROFURANTOIN 25; 75 MG/1; MG/1
100 CAPSULE ORAL 2 TIMES DAILY
Qty: 14 CAPSULE | Refills: 0 | Status: SHIPPED | OUTPATIENT
Start: 2019-07-19 | End: 2019-07-26

## 2019-07-19 RX ORDER — GABAPENTIN 600 MG/1
600 TABLET ORAL 3 TIMES DAILY
Status: ON HOLD | COMMUNITY
End: 2020-02-16 | Stop reason: SDUPTHER

## 2019-07-19 RX ORDER — NITROFURANTOIN 25; 75 MG/1; MG/1
100 CAPSULE ORAL EVERY 12 HOURS
Status: DISCONTINUED | OUTPATIENT
Start: 2019-07-19 | End: 2019-07-19 | Stop reason: HOSPADM

## 2019-07-19 RX ADMIN — NITROFURANTOIN (MONOHYDRATE/MACROCRYSTALS) 100 MG: 75; 25 CAPSULE ORAL at 08:07

## 2019-07-19 RX ADMIN — SODIUM CHLORIDE 1000 ML: 0.9 INJECTION, SOLUTION INTRAVENOUS at 06:07

## 2019-07-20 NOTE — ED NOTES
Pt in room 7 for evaluation of fatigue.  Pt is awake, alert and oriented. Resp even and unlabored. Abd non-tender. Pt denies chest pain or sob. Zak breath sounds clear.

## 2019-07-20 NOTE — ED PROVIDER NOTES
Encounter Date: 7/19/2019       History     Chief Complaint   Patient presents with    Fatigue     generalized weakness x 2 weeks. Decreased appetite. Denies N/V/D     Queta Ocampo is a 74 y.o. Female presenting for evaluation of overall fatigue, persisting for the last 2 weeks.  Patient states that she has decreased appetite and feels as if she may be dehydrated.  Some dysuria, but no hematuria.  No abdominal pain, nausea, vomiting or diarrhea.  No headache or neck pain.  No chest pain or chest congestion.  No shortness of breath or difficulty breathing.  Patient states she just does not feel well.  She she has had similar symptoms in the past, which was related to urinary tract infections.    The history is provided by the patient.     Review of patient's allergies indicates:   Allergen Reactions    Codeine Itching, Swelling, Rash, Other (See Comments) and Nausea And Vomiting     Facial swelling, itching, rash, erythema    Pcn [penicillins] Itching, Swelling, Rash and Other (See Comments)     Facial swelling with rash, erythema, itching     Past Medical History:   Diagnosis Date    Depression     Nervous breakdown     Renal disorder      Past Surgical History:   Procedure Laterality Date    APPENDECTOMY      FOOT SURGERY      SHOULDER SURGERY Left     TUBAL LIGATION       No family history on file.  Social History     Tobacco Use    Smoking status: Current Every Day Smoker     Packs/day: 0.50     Types: Cigarettes    Smokeless tobacco: Never Used   Substance Use Topics    Alcohol use: Not Currently    Drug use: Not Currently     Review of Systems   Constitutional: Positive for appetite change and fatigue. Negative for chills and fever.   HENT: Negative for facial swelling and trouble swallowing.    Eyes: Negative for discharge.   Respiratory: Negative for cough, chest tightness, shortness of breath and wheezing.    Cardiovascular: Negative for chest pain and palpitations.   Gastrointestinal: Negative  for abdominal pain, diarrhea, nausea and vomiting.   Genitourinary: Positive for dysuria. Negative for hematuria.   Musculoskeletal: Negative for arthralgias, back pain, joint swelling, myalgias, neck pain and neck stiffness.   Skin: Negative for color change, pallor, rash and wound.   Neurological: Negative for dizziness, syncope, weakness, light-headedness, numbness and headaches.   Hematological: Does not bruise/bleed easily.   Psychiatric/Behavioral: The patient is not nervous/anxious.        Physical Exam     Initial Vitals [07/19/19 1647]   BP Pulse Resp Temp SpO2   129/73 87 16 98.6 °F (37 °C) 97 %      MAP       --         Physical Exam    Nursing note and vitals reviewed.  Constitutional: She appears well-developed and well-nourished. She is not diaphoretic. No distress.   HENT:   Head: Normocephalic and atraumatic.   Right Ear: External ear normal.   Left Ear: External ear normal.   Nose: Nose normal.   Mouth/Throat: Oropharynx is clear and moist.   Eyes: Conjunctivae and EOM are normal. Pupils are equal, round, and reactive to light.   Neck: Normal range of motion. Neck supple.   Cardiovascular: Normal rate, regular rhythm, normal heart sounds and intact distal pulses. Exam reveals no gallop and no friction rub.    No murmur heard.  Pulmonary/Chest: Breath sounds normal. No respiratory distress. She has no wheezes. She has no rhonchi. She has no rales.   Abdominal: Soft. She exhibits no distension and no mass. There is no tenderness.   No palpable abdominal tenderness noted.       Musculoskeletal: Normal range of motion. She exhibits no edema or tenderness.   Lymphadenopathy:     She has no cervical adenopathy.   Neurological: She is alert and oriented to person, place, and time. She has normal strength. No cranial nerve deficit or sensory deficit.   No focal neurological deficits noted.  Cranial nerves III-XII grossly intact.  Equal, rapid alternating movements noted to bilateral upper and lower  extremities.      Skin: Skin is warm and dry. No rash and no abscess noted. No erythema.   Psychiatric: She has a normal mood and affect.         ED Course   Procedures  Labs Reviewed   CBC W/ AUTO DIFFERENTIAL - Abnormal; Notable for the following components:       Result Value    WBC 14.02 (*)     Gran # (ANC) 9.9 (*)     Immature Grans (Abs) 0.09 (*)     All other components within normal limits   COMPREHENSIVE METABOLIC PANEL - Abnormal; Notable for the following components:    Glucose 169 (*)     BUN, Bld 36 (*)     eGFR if  47 (*)     eGFR if non  41 (*)     All other components within normal limits   URINALYSIS, REFLEX TO URINE CULTURE - Abnormal; Notable for the following components:    Appearance, UA Hazy (*)     Occult Blood UA 1+ (*)     Leukocytes, UA 1+ (*)     All other components within normal limits    Narrative:     Preferred Collection Type->Urine, Clean Catch   URINALYSIS MICROSCOPIC - Abnormal; Notable for the following components:    RBC, UA 8 (*)     WBC, UA 53 (*)     Bacteria Moderate (*)     All other components within normal limits    Narrative:     Preferred Collection Type->Urine, Clean Catch   CULTURE, URINE   MAGNESIUM   PHOSPHORUS   TSH          Imaging Results          X-Ray Chest PA And Lateral (In process)                  Medical Decision Making:   History:   Old Medical Records: I decided to obtain old medical records.  Old Records Summarized: records from clinic visits and records from previous admission(s).  Differential Diagnosis:   Dehydration  UTI  Sepsis  Electrolyte imbalance  Clinical Tests:   Lab Tests: Reviewed and Ordered  Radiological Study: Ordered and Reviewed       APC / Resident Notes:   Labs consistent with mildly elevated white blood cell count and some dehydration.  Chest x-ray shows no evidence for acute intra pulmonary process.  Some white blood cells and bacteria on urinalysis.  Urine culture is sent.  She wishes to be treated  at this time with Macrobid.  She is feeling better after IV fluids here in the emergency department.  She will be discharged home to follow up with her primary care provider for re-evaluation and further treatment options.  She voices understanding and is agreeable to the plan.  She is given specific return precautions. Patient reports similarity with several prior UTIs.         Attending Attestation:     Physician Attestation Statement for NP/PA:   I have conducted a face to face encounter with this patient in addition to the NP/PA, due to Medical Complexity    Other NP/PA Attestation Additions:      Medical Decision Making: I provided a face to face evaluation of this patient.  I discussed the patient's care with Advanced Practice Clinician.  I reviewed their note and agree with the history, physical, assessment, diagnosis, treatment, and discharge plan provided by the Advanced Practice Clinician. My overall impression is fatigue, possible UTI. Patient refuses cath, understands there are risks to abx. I wanted to wait for cx but patient requesting abx. rx'd after risk/benefit discussion.  The patient has been instructed to follow up with their physician or the one provided as well as specific return precautions.                       Clinical Impression:       ICD-10-CM ICD-9-CM   1. Fatigue R53.83 780.79   2. Weakness R53.1 780.79   3. Urinary tract infection without hematuria, site unspecified N39.0 599.0                                Elicia Lorenz PA-C  07/20/19 0126       Chin Howard MD  07/21/19 2052

## 2019-07-21 LAB
BACTERIA UR CULT: NORMAL
BACTERIA UR CULT: NORMAL

## 2020-02-13 ENCOUNTER — HOSPITAL ENCOUNTER (INPATIENT)
Facility: HOSPITAL | Age: 75
LOS: 1 days | Discharge: OTHER INSTITUTION | DRG: 282 | End: 2020-02-14
Attending: EMERGENCY MEDICINE | Admitting: INTERNAL MEDICINE
Payer: MEDICARE

## 2020-02-13 DIAGNOSIS — R07.9 CHEST PAIN: ICD-10-CM

## 2020-02-13 DIAGNOSIS — R79.89 ELEVATED TROPONIN: ICD-10-CM

## 2020-02-13 DIAGNOSIS — I21.4 NSTEMI (NON-ST ELEVATED MYOCARDIAL INFARCTION): Primary | ICD-10-CM

## 2020-02-13 LAB
ALBUMIN SERPL BCP-MCNC: 3.8 G/DL (ref 3.5–5.2)
ALP SERPL-CCNC: 46 U/L (ref 55–135)
ALT SERPL W/O P-5'-P-CCNC: 11 U/L (ref 10–44)
ANION GAP SERPL CALC-SCNC: 10 MMOL/L (ref 8–16)
AST SERPL-CCNC: 16 U/L (ref 10–40)
BACTERIA #/AREA URNS HPF: ABNORMAL /HPF
BASOPHILS # BLD AUTO: 0.02 K/UL (ref 0–0.2)
BASOPHILS NFR BLD: 0.2 % (ref 0–1.9)
BILIRUB SERPL-MCNC: 0.3 MG/DL (ref 0.1–1)
BILIRUB UR QL STRIP: NEGATIVE
BUN SERPL-MCNC: 19 MG/DL (ref 8–23)
CALCIUM SERPL-MCNC: 9.5 MG/DL (ref 8.7–10.5)
CHLORIDE SERPL-SCNC: 107 MMOL/L (ref 95–110)
CLARITY UR: ABNORMAL
CO2 SERPL-SCNC: 26 MMOL/L (ref 23–29)
COLOR UR: YELLOW
CREAT SERPL-MCNC: 1.2 MG/DL (ref 0.5–1.4)
DIFFERENTIAL METHOD: ABNORMAL
EOSINOPHIL # BLD AUTO: 0.1 K/UL (ref 0–0.5)
EOSINOPHIL NFR BLD: 1.2 % (ref 0–8)
ERYTHROCYTE [DISTWIDTH] IN BLOOD BY AUTOMATED COUNT: 13.3 % (ref 11.5–14.5)
EST. GFR  (AFRICAN AMERICAN): 51 ML/MIN/1.73 M^2
EST. GFR  (NON AFRICAN AMERICAN): 44 ML/MIN/1.73 M^2
GLUCOSE SERPL-MCNC: 84 MG/DL (ref 70–110)
GLUCOSE UR QL STRIP: NEGATIVE
HCT VFR BLD AUTO: 37.8 % (ref 37–48.5)
HGB BLD-MCNC: 12.5 G/DL (ref 12–16)
HGB UR QL STRIP: ABNORMAL
IMM GRANULOCYTES # BLD AUTO: 0.02 K/UL (ref 0–0.04)
KETONES UR QL STRIP: NEGATIVE
LEUKOCYTE ESTERASE UR QL STRIP: ABNORMAL
LYMPHOCYTES # BLD AUTO: 2.6 K/UL (ref 1–4.8)
LYMPHOCYTES NFR BLD: 29.2 % (ref 18–48)
MCH RBC QN AUTO: 31.6 PG (ref 27–31)
MCHC RBC AUTO-ENTMCNC: 33.1 G/DL (ref 32–36)
MCV RBC AUTO: 96 FL (ref 82–98)
MICROSCOPIC COMMENT: ABNORMAL
MONOCYTES # BLD AUTO: 0.7 K/UL (ref 0.3–1)
MONOCYTES NFR BLD: 7.9 % (ref 4–15)
NEUTROPHILS # BLD AUTO: 5.5 K/UL (ref 1.8–7.7)
NEUTROPHILS NFR BLD: 61.3 % (ref 38–73)
NITRITE UR QL STRIP: NEGATIVE
NRBC BLD-RTO: 0 /100 WBC
PH UR STRIP: 7 [PH] (ref 5–8)
PLATELET # BLD AUTO: 162 K/UL (ref 150–350)
PMV BLD AUTO: 11 FL (ref 9.2–12.9)
POTASSIUM SERPL-SCNC: 3.9 MMOL/L (ref 3.5–5.1)
PROT SERPL-MCNC: 7.2 G/DL (ref 6–8.4)
PROT UR QL STRIP: NEGATIVE
RBC # BLD AUTO: 3.95 M/UL (ref 4–5.4)
RBC #/AREA URNS HPF: 2 /HPF (ref 0–4)
SODIUM SERPL-SCNC: 143 MMOL/L (ref 136–145)
SP GR UR STRIP: 1.02 (ref 1–1.03)
SQUAMOUS #/AREA URNS HPF: 7 /HPF
TROPONIN I SERPL DL<=0.01 NG/ML-MCNC: 0.3 NG/ML (ref 0–0.03)
TROPONIN I SERPL DL<=0.01 NG/ML-MCNC: 0.43 NG/ML (ref 0–0.03)
URN SPEC COLLECT METH UR: ABNORMAL
UROBILINOGEN UR STRIP-ACNC: NEGATIVE EU/DL
WBC # BLD AUTO: 8.97 K/UL (ref 3.9–12.7)
WBC #/AREA URNS HPF: 12 /HPF (ref 0–5)

## 2020-02-13 PROCEDURE — 25000003 PHARM REV CODE 250: Performed by: EMERGENCY MEDICINE

## 2020-02-13 PROCEDURE — 99291 CRITICAL CARE FIRST HOUR: CPT | Mod: 25

## 2020-02-13 PROCEDURE — 93005 ELECTROCARDIOGRAM TRACING: CPT

## 2020-02-13 PROCEDURE — 25000003 PHARM REV CODE 250: Performed by: INTERNAL MEDICINE

## 2020-02-13 PROCEDURE — 63600175 PHARM REV CODE 636 W HCPCS: Performed by: INTERNAL MEDICINE

## 2020-02-13 PROCEDURE — 80053 COMPREHEN METABOLIC PANEL: CPT

## 2020-02-13 PROCEDURE — 94761 N-INVAS EAR/PLS OXIMETRY MLT: CPT

## 2020-02-13 PROCEDURE — 85025 COMPLETE CBC W/AUTO DIFF WBC: CPT

## 2020-02-13 PROCEDURE — 84484 ASSAY OF TROPONIN QUANT: CPT

## 2020-02-13 PROCEDURE — 81000 URINALYSIS NONAUTO W/SCOPE: CPT

## 2020-02-13 PROCEDURE — 36415 COLL VENOUS BLD VENIPUNCTURE: CPT

## 2020-02-13 PROCEDURE — 87086 URINE CULTURE/COLONY COUNT: CPT

## 2020-02-13 PROCEDURE — 11000001 HC ACUTE MED/SURG PRIVATE ROOM

## 2020-02-13 RX ORDER — CLONAZEPAM 0.5 MG/1
0.5 TABLET ORAL NIGHTLY PRN
Status: DISCONTINUED | OUTPATIENT
Start: 2020-02-13 | End: 2020-02-14 | Stop reason: HOSPADM

## 2020-02-13 RX ORDER — FLUOXETINE HYDROCHLORIDE 20 MG/1
20 CAPSULE ORAL DAILY
Status: DISCONTINUED | OUTPATIENT
Start: 2020-02-14 | End: 2020-02-14 | Stop reason: HOSPADM

## 2020-02-13 RX ORDER — ACETAMINOPHEN 325 MG/1
650 TABLET ORAL EVERY 6 HOURS PRN
Status: DISCONTINUED | OUTPATIENT
Start: 2020-02-13 | End: 2020-02-14 | Stop reason: HOSPADM

## 2020-02-13 RX ORDER — ATORVASTATIN CALCIUM 40 MG/1
40 TABLET, FILM COATED ORAL NIGHTLY
Status: DISCONTINUED | OUTPATIENT
Start: 2020-02-13 | End: 2020-02-14

## 2020-02-13 RX ORDER — TALC
9 POWDER (GRAM) TOPICAL NIGHTLY PRN
Status: DISCONTINUED | OUTPATIENT
Start: 2020-02-13 | End: 2020-02-14 | Stop reason: HOSPADM

## 2020-02-13 RX ORDER — SODIUM CHLORIDE 0.9 % (FLUSH) 0.9 %
10 SYRINGE (ML) INJECTION
Status: DISCONTINUED | OUTPATIENT
Start: 2020-02-13 | End: 2020-02-14 | Stop reason: HOSPADM

## 2020-02-13 RX ORDER — INSULIN ASPART 100 [IU]/ML
0-5 INJECTION, SOLUTION INTRAVENOUS; SUBCUTANEOUS
Status: DISCONTINUED | OUTPATIENT
Start: 2020-02-13 | End: 2020-02-14 | Stop reason: HOSPADM

## 2020-02-13 RX ORDER — IBUPROFEN 200 MG
24 TABLET ORAL
Status: DISCONTINUED | OUTPATIENT
Start: 2020-02-13 | End: 2020-02-14 | Stop reason: HOSPADM

## 2020-02-13 RX ORDER — ATENOLOL 25 MG/1
25 TABLET ORAL DAILY
Status: ON HOLD | COMMUNITY
End: 2020-02-16 | Stop reason: HOSPADM

## 2020-02-13 RX ORDER — METOPROLOL TARTRATE 25 MG/1
12.5 TABLET ORAL 2 TIMES DAILY
Status: DISCONTINUED | OUTPATIENT
Start: 2020-02-13 | End: 2020-02-14 | Stop reason: HOSPADM

## 2020-02-13 RX ORDER — GABAPENTIN 400 MG/1
400 CAPSULE ORAL 3 TIMES DAILY
Status: DISCONTINUED | OUTPATIENT
Start: 2020-02-13 | End: 2020-02-14 | Stop reason: HOSPADM

## 2020-02-13 RX ORDER — PANTOPRAZOLE SODIUM 40 MG/1
40 TABLET, DELAYED RELEASE ORAL
Status: COMPLETED | OUTPATIENT
Start: 2020-02-13 | End: 2020-02-13

## 2020-02-13 RX ORDER — LANOLIN ALCOHOL/MO/W.PET/CERES
800 CREAM (GRAM) TOPICAL
Status: DISCONTINUED | OUTPATIENT
Start: 2020-02-13 | End: 2020-02-14 | Stop reason: HOSPADM

## 2020-02-13 RX ORDER — PANTOPRAZOLE SODIUM 40 MG/1
40 TABLET, DELAYED RELEASE ORAL DAILY
Status: DISCONTINUED | OUTPATIENT
Start: 2020-02-14 | End: 2020-02-14 | Stop reason: HOSPADM

## 2020-02-13 RX ORDER — FLUOXETINE HYDROCHLORIDE 40 MG/1
40 CAPSULE ORAL DAILY
COMMUNITY

## 2020-02-13 RX ORDER — IBUPROFEN 200 MG
16 TABLET ORAL
Status: DISCONTINUED | OUTPATIENT
Start: 2020-02-13 | End: 2020-02-14 | Stop reason: HOSPADM

## 2020-02-13 RX ORDER — NITROGLYCERIN 0.4 MG/1
0.4 TABLET SUBLINGUAL EVERY 5 MIN PRN
Status: DISCONTINUED | OUTPATIENT
Start: 2020-02-13 | End: 2020-02-14 | Stop reason: HOSPADM

## 2020-02-13 RX ORDER — NAPROXEN SODIUM 220 MG/1
81 TABLET, FILM COATED ORAL DAILY
Status: DISCONTINUED | OUTPATIENT
Start: 2020-02-13 | End: 2020-02-14 | Stop reason: HOSPADM

## 2020-02-13 RX ORDER — GLUCAGON 1 MG
1 KIT INJECTION
Status: DISCONTINUED | OUTPATIENT
Start: 2020-02-13 | End: 2020-02-14 | Stop reason: HOSPADM

## 2020-02-13 RX ORDER — MIRTAZAPINE 15 MG/1
30 TABLET, FILM COATED ORAL NIGHTLY
Status: DISCONTINUED | OUTPATIENT
Start: 2020-02-13 | End: 2020-02-14 | Stop reason: HOSPADM

## 2020-02-13 RX ORDER — POTASSIUM CHLORIDE 20 MEQ/15ML
40 SOLUTION ORAL
Status: DISCONTINUED | OUTPATIENT
Start: 2020-02-13 | End: 2020-02-14 | Stop reason: HOSPADM

## 2020-02-13 RX ORDER — AMOXICILLIN 250 MG
1 CAPSULE ORAL 2 TIMES DAILY
Status: DISCONTINUED | OUTPATIENT
Start: 2020-02-13 | End: 2020-02-14 | Stop reason: HOSPADM

## 2020-02-13 RX ORDER — ONDANSETRON 2 MG/ML
4 INJECTION INTRAMUSCULAR; INTRAVENOUS EVERY 8 HOURS PRN
Status: DISCONTINUED | OUTPATIENT
Start: 2020-02-13 | End: 2020-02-14 | Stop reason: HOSPADM

## 2020-02-13 RX ORDER — POTASSIUM CHLORIDE 20 MEQ/15ML
60 SOLUTION ORAL
Status: DISCONTINUED | OUTPATIENT
Start: 2020-02-13 | End: 2020-02-14 | Stop reason: HOSPADM

## 2020-02-13 RX ORDER — CLONAZEPAM 1 MG/1
1 TABLET ORAL NIGHTLY PRN
Status: ON HOLD | COMMUNITY
End: 2020-02-16 | Stop reason: HOSPADM

## 2020-02-13 RX ORDER — ENOXAPARIN SODIUM 100 MG/ML
1 INJECTION SUBCUTANEOUS
Status: DISCONTINUED | OUTPATIENT
Start: 2020-02-13 | End: 2020-02-14

## 2020-02-13 RX ADMIN — METOPROLOL TARTRATE 12.5 MG: 25 TABLET, FILM COATED ORAL at 08:02

## 2020-02-13 RX ADMIN — CLONAZEPAM 0.5 MG: 0.5 TABLET ORAL at 08:02

## 2020-02-13 RX ADMIN — PANTOPRAZOLE SODIUM 40 MG: 40 TABLET, DELAYED RELEASE ORAL at 03:02

## 2020-02-13 RX ADMIN — ASPIRIN 81 MG 81 MG: 81 TABLET ORAL at 07:02

## 2020-02-13 RX ADMIN — ENOXAPARIN SODIUM 70 MG: 100 INJECTION SUBCUTANEOUS at 10:02

## 2020-02-13 RX ADMIN — GABAPENTIN 400 MG: 400 CAPSULE ORAL at 08:02

## 2020-02-13 RX ADMIN — ATORVASTATIN CALCIUM 40 MG: 40 TABLET, FILM COATED ORAL at 08:02

## 2020-02-13 NOTE — ED PROVIDER NOTES
Encounter Date: 2/13/2020    SCRIBE #1 NOTE: I, Pebbles Nicole, am scribing for, and in the presence of, Azar Butler MD .       History     Chief Complaint   Patient presents with    Chest Pain     for a couple of days now but gone since has gotten here. Associated SOB       Time seen by provider: 2:37 PM on 02/13/2020    Queta Ocampo is a 75 y.o. Female who presents to the ED with an onset of chest pain. Patient states that pain is non-radiating and began x3 days ago. comes and goes, lasting for ~ 5-6 minutes and resolves on its own. She notes that chest pain is not current since arriving to the ED. Patient denies symptoms of fever, nausea, vomiting, shortness of breath. Patient has PMHx of diabetes mellitus II and tachycarda. No cardiac PSHx.    The history is provided by the patient.     Review of patient's allergies indicates:   Allergen Reactions    Codeine Itching, Swelling, Rash, Other (See Comments) and Nausea And Vomiting     Facial swelling, itching, rash, erythema    Pcn [penicillins] Itching, Swelling, Rash and Other (See Comments)     Facial swelling with rash, erythema, itching     Past Medical History:   Diagnosis Date    Depression     Diabetes mellitus     type 2    Nervous breakdown     Renal disorder     Tachycardia      Past Surgical History:   Procedure Laterality Date    APPENDECTOMY      FOOT SURGERY      SHOULDER SURGERY Left     TUBAL LIGATION       History reviewed. No pertinent family history.  Social History     Tobacco Use    Smoking status: Current Every Day Smoker     Packs/day: 0.50     Types: Cigarettes    Smokeless tobacco: Never Used   Substance Use Topics    Alcohol use: Not Currently    Drug use: Not Currently     Review of Systems   Constitutional: Negative for activity change, appetite change, chills, fatigue and fever.   Eyes: Negative for visual disturbance.   Respiratory: Negative for apnea and shortness of breath.    Cardiovascular: Positive for chest pain.  Negative for palpitations.   Gastrointestinal: Negative for abdominal distention, abdominal pain, nausea and vomiting.   Genitourinary: Negative for difficulty urinating.   Musculoskeletal: Negative for neck pain.   Skin: Negative for pallor and rash.   Neurological: Negative for headaches.   Hematological: Does not bruise/bleed easily.   Psychiatric/Behavioral: Negative for agitation.       Physical Exam     Initial Vitals   BP Pulse Resp Temp SpO2   02/13/20 1435 02/13/20 1435 02/13/20 1437 02/13/20 1437 02/13/20 1435   (!) 166/83 79 16 97.9 °F (36.6 °C) 99 %      MAP       --                Physical Exam    Nursing note and vitals reviewed.  Constitutional: She appears well-developed and well-nourished.   HENT:   Head: Normocephalic and atraumatic.   Eyes: Conjunctivae are normal.   Neck: Normal range of motion. Neck supple.   Cardiovascular: Normal rate and regular rhythm. Exam reveals no gallop and no friction rub.    Murmur heard.   Systolic murmur is present with a grade of 1/6.  Pulmonary/Chest: Effort normal and breath sounds normal. No respiratory distress. She has no wheezes. She has no rhonchi. She has no rales.   Abdominal: Soft. She exhibits no distension. There is no tenderness.   Musculoskeletal: Normal range of motion.   Neurological: She is alert and oriented to person, place, and time.   Skin: Skin is warm and dry. No erythema.   Psychiatric: She has a normal mood and affect.         ED Course   Critical Care  Date/Time: 2/13/2020 6:03 PM  Performed by: Azar Butler III, MD  Authorized by: Breanne Shipman MD   Direct patient critical care time: 45 minutes  Total critical care time (exclusive of procedural time) : 45 minutes  Critical care was necessary to treat or prevent imminent or life-threatening deterioration of the following conditions: Non ST elevation MI.  Critical care was time spent personally by me on the following activities: review of old charts, pulse oximetry, ordering and  review of laboratory studies, obtaining history from patient or surrogate, discussions with consultants, development of treatment plan with patient or surrogate, discussions with primary provider, examination of patient, ordering and performing treatments and interventions, ordering and review of radiographic studies and re-evaluation of patient's condition.  Subsequent provider of critical care: I assumed direction of critical care for this patient from another provider of my specialty.        Labs Reviewed   CBC W/ AUTO DIFFERENTIAL - Abnormal; Notable for the following components:       Result Value    RBC 3.95 (*)     Mean Corpuscular Hemoglobin 31.6 (*)     All other components within normal limits   COMPREHENSIVE METABOLIC PANEL - Abnormal; Notable for the following components:    Alkaline Phosphatase 46 (*)     eGFR if  51 (*)     eGFR if non  44 (*)     All other components within normal limits   TROPONIN I - Abnormal; Notable for the following components:    Troponin I 0.304 (*)     All other components within normal limits     EKG Readings: (Independently Interpreted)   Rhythm: Normal Sinus Rhythm. Heart Rate: 82. Ectopy: No Ectopy. Conduction: Normal. ST Segments: Non-Specific ST Segment Depression. T Waves: Normal. Axis: Normal. Clinical Impression: Normal Sinus Rhythm     ECG Results          EKG 12-lead (In process)  Result time 02/13/20 15:51:43    In process by Interface, Lab In [facility] (02/13/20 15:51:43)                 Narrative:    Test Reason : R07.9    Vent. Rate : 082 BPM     Atrial Rate : 082 BPM     P-R Int : 140 ms          QRS Dur : 080 ms      QT Int : 390 ms       P-R-T Axes : 081 072 073 degrees     QTc Int : 455 ms    Sinus rhythm with marked sinus arrythmia  Minimal voltage criteria for LVH, may be normal variant  Nonspecific ST and T wave abnormality  Abnormal ECG  When compared with ECG of 19-JUL-2019 17:04,  Premature atrial complexes are no longer  Present    Referred By:  ED MD           Confirmed By:                             Imaging Results          X-Ray Chest AP Portable (Final result)  Result time 02/13/20 14:57:07    Final result by Gris Ricardo MD (02/13/20 14:57:07)                 Impression:      No acute cardiopulmonary disease.      Electronically signed by: Gris Ricardo MD  Date:    02/13/2020  Time:    14:57             Narrative:    EXAMINATION:  XR CHEST AP PORTABLE    CLINICAL HISTORY:  Chest pain, unspecified    TECHNIQUE:  Single frontal view of the chest was performed.    COMPARISON:  07/19/2019    FINDINGS:  The heart is not enlarged.  The lungs are expanded and are clear.  Costophrenic sulci are sharp.  Postoperative changes noted proximal left humerus                                 Medical Decision Making:   History:   Old Medical Records: I decided to obtain old medical records.  Independently Interpreted Test(s):   I have ordered and independently interpreted EKG Reading(s) - see prior notes  Clinical Tests:   Lab Tests: Ordered and Reviewed  Radiological Study: Ordered and Reviewed  Medical Tests: Ordered and Reviewed  ED Management:  75-year-old female with no significant cardiac history presents with a 2-3 day history of intermittent retrosternal pain. EKG fails to demonstrate any acute evidence of MI; however, she has a significantly elevated troponin concerning for non ST elevation MI.  She is hemodynamically stable without ongoing chest pain.  I discussed the case with  who recommends admission to this facility.  Other:   I have discussed this case with another health care provider.       APC / Resident Notes:   I, Dr. Azar Butler III, personally performed the services described in this documentation. All medical record entries made by the scribe were at my direction and in my presence.  I have reviewed the chart and agree that the record reflects my personal performance and is accurate and complete        Scribe Attestation:   Scribe #1: I performed the above scribed service and the documentation accurately describes the services I performed. I attest to the accuracy of the note.                  Clinical Impression:       ICD-10-CM ICD-9-CM   1. Chest pain R07.9 786.50   2. Chest pain R07.9 786.50         Disposition:   Disposition: Admitted                     Azar Butler III, MD  02/13/20 6118

## 2020-02-13 NOTE — ED NOTES
"Patient identifiers for Queta Ocampo checked and correct.  LOC: Patient is awake, alert, and aware of environment with an appropriate affect. Patient is oriented x 3 and speaking appropriately.  APPEARANCE: Patient resting comfortably and in no acute distress. Patient is clean and well groomed, patient's clothing is properly fastened.  SKIN: The skin is warm and dry. Patient has normal skin turgor and moist mucus membrances. Skin is intact; no bruising or breakdown noted.  MUSKULOSKELETAL: Patient is moving all extremities well, no obvious deformities noted. Pulses intact.   RESPIRATORY: Airway is open and patent. Respirations are spontaneous and non-labored with normal effort and rate.  CARDIAC: Patient has a normal rate and rhythm. No peripheral edema noted. Capillary refill < 3 seconds. C/o chest pain x3 days "off and on lasting for 5-6 min at a time" denies pain at this time. Per MD systolic murmur grade 1/6   ABDOMEN: No distention noted. Bowel sounds active in all 4 quadrants. Soft and non-tender upon palpation.  NEUROLOGICAL: PERRL. Facial expression is symmetrical. Hand grasps are equal bilaterally. Normal sensation in all extremities when touched with finger.  Allergies reported:   Review of patient's allergies indicates:   Allergen Reactions    Codeine Itching, Swelling, Rash, Other (See Comments) and Nausea And Vomiting     Facial swelling, itching, rash, erythema    Pcn [penicillins] Itching, Swelling, Rash and Other (See Comments)     Facial swelling with rash, erythema, itching     "

## 2020-02-13 NOTE — ED NOTES
Updated on POC aware that she will stay in hospital [facility] rm [facility] and will be transferred to her room as soon as it is ready. No c/o call light in reach aware to notify nurse of needs or concerns.

## 2020-02-14 ENCOUNTER — HOSPITAL ENCOUNTER (INPATIENT)
Facility: HOSPITAL | Age: 75
LOS: 1 days | Discharge: HOME HEALTH | DRG: 247 | End: 2020-02-16
Attending: INTERNAL MEDICINE | Admitting: INTERNAL MEDICINE
Payer: MEDICARE

## 2020-02-14 ENCOUNTER — CLINICAL SUPPORT (OUTPATIENT)
Dept: CARDIOLOGY | Facility: HOSPITAL | Age: 75
DRG: 247 | End: 2020-02-14
Attending: INTERNAL MEDICINE
Payer: MEDICARE

## 2020-02-14 VITALS
RESPIRATION RATE: 20 BRPM | OXYGEN SATURATION: 97 % | HEART RATE: 64 BPM | BODY MASS INDEX: 23.39 KG/M2 | DIASTOLIC BLOOD PRESSURE: 70 MMHG | TEMPERATURE: 97 F | WEIGHT: 154.31 LBS | SYSTOLIC BLOOD PRESSURE: 160 MMHG | HEIGHT: 68 IN

## 2020-02-14 VITALS — WEIGHT: 154.31 LBS | BODY MASS INDEX: 23.39 KG/M2 | HEIGHT: 68 IN

## 2020-02-14 DIAGNOSIS — I25.10 CAD (CORONARY ARTERY DISEASE): ICD-10-CM

## 2020-02-14 DIAGNOSIS — I21.4 NSTEMI (NON-ST ELEVATED MYOCARDIAL INFARCTION): Primary | ICD-10-CM

## 2020-02-14 DIAGNOSIS — R07.9 CHEST PAIN: ICD-10-CM

## 2020-02-14 PROBLEM — E11.9 DIABETES MELLITUS: Status: ACTIVE | Noted: 2020-02-14

## 2020-02-14 PROBLEM — I10 HTN (HYPERTENSION): Status: ACTIVE | Noted: 2020-02-14

## 2020-02-14 LAB
ALBUMIN SERPL BCP-MCNC: 3.5 G/DL (ref 3.5–5.2)
ALP SERPL-CCNC: 44 U/L (ref 55–135)
ALT SERPL W/O P-5'-P-CCNC: 8 U/L (ref 10–44)
ANION GAP SERPL CALC-SCNC: 10 MMOL/L (ref 8–16)
AORTIC ROOT ANNULUS: 3.27 CM
AORTIC VALVE CUSP SEPERATION: 1.09 CM
AST SERPL-CCNC: 14 U/L (ref 10–40)
AV INDEX (PROSTH): 0.38
AV MEAN GRADIENT: 12 MMHG
AV PEAK GRADIENT: 22 MMHG
AV VALVE AREA: 1.35 CM2
AV VELOCITY RATIO: 38.12
BASOPHILS # BLD AUTO: 0.03 K/UL (ref 0–0.2)
BASOPHILS NFR BLD: 0.4 % (ref 0–1.9)
BILIRUB SERPL-MCNC: 0.2 MG/DL (ref 0.1–1)
BSA FOR ECHO PROCEDURE: 1.83 M2
BUN SERPL-MCNC: 18 MG/DL (ref 8–23)
CALCIUM SERPL-MCNC: 9.6 MG/DL (ref 8.7–10.5)
CHLORIDE SERPL-SCNC: 108 MMOL/L (ref 95–110)
CO2 SERPL-SCNC: 25 MMOL/L (ref 23–29)
CREAT SERPL-MCNC: 1.1 MG/DL (ref 0.5–1.4)
CV ECHO LV RWT: 0.44 CM
DIFFERENTIAL METHOD: ABNORMAL
DOP CALC AO PEAK VEL: 2.36 M/S
DOP CALC AO VTI: 48.04 CM
DOP CALC LVOT AREA: 3.5 CM2
DOP CALC LVOT DIAMETER: 2.12 CM
DOP CALC LVOT PEAK VEL: 89.96 M/S
DOP CALC LVOT STROKE VOLUME: 64.92 CM3
DOP CALCLVOT PEAK VEL VTI: 18.4 CM
E WAVE DECELERATION TIME: 169.9 MSEC
E/A RATIO: 0.89
E/E' RATIO: 7.29 M/S
ECHO LV POSTERIOR WALL: 0.98 CM (ref 0.6–1.1)
EOSINOPHIL # BLD AUTO: 0.3 K/UL (ref 0–0.5)
EOSINOPHIL NFR BLD: 4 % (ref 0–8)
ERYTHROCYTE [DISTWIDTH] IN BLOOD BY AUTOMATED COUNT: 13.6 % (ref 11.5–14.5)
EST. GFR  (AFRICAN AMERICAN): 57 ML/MIN/1.73 M^2
EST. GFR  (NON AFRICAN AMERICAN): 49 ML/MIN/1.73 M^2
FRACTIONAL SHORTENING: 30 % (ref 28–44)
GLUCOSE SERPL-MCNC: 114 MG/DL (ref 70–110)
GLUCOSE SERPL-MCNC: 136 MG/DL (ref 70–110)
HCT VFR BLD AUTO: 34.9 % (ref 37–48.5)
HGB BLD-MCNC: 11.2 G/DL (ref 12–16)
IMM GRANULOCYTES # BLD AUTO: 0.02 K/UL (ref 0–0.04)
INTERVENTRICULAR SEPTUM: 0.96 CM (ref 0.6–1.1)
IVRT: 116.03 MSEC
LEFT ATRIUM SIZE: 3.33 CM
LEFT INTERNAL DIMENSION IN SYSTOLE: 3.16 CM (ref 2.1–4)
LEFT VENTRICLE MASS INDEX: 80 G/M2
LEFT VENTRICULAR INTERNAL DIMENSION IN DIASTOLE: 4.49 CM (ref 3.5–6)
LEFT VENTRICULAR MASS: 146.47 G
LV LATERAL E/E' RATIO: 6.38 M/S
LV SEPTAL E/E' RATIO: 8.5 M/S
LYMPHOCYTES # BLD AUTO: 2.7 K/UL (ref 1–4.8)
LYMPHOCYTES NFR BLD: 34.7 % (ref 18–48)
MAGNESIUM SERPL-MCNC: 1.8 MG/DL (ref 1.6–2.6)
MCH RBC QN AUTO: 30.7 PG (ref 27–31)
MCHC RBC AUTO-ENTMCNC: 32.1 G/DL (ref 32–36)
MCV RBC AUTO: 96 FL (ref 82–98)
MONOCYTES # BLD AUTO: 0.7 K/UL (ref 0.3–1)
MONOCYTES NFR BLD: 9.1 % (ref 4–15)
MV PEAK A VEL: 0.57 M/S
MV PEAK E VEL: 0.51 M/S
NEUTROPHILS # BLD AUTO: 4 K/UL (ref 1.8–7.7)
NEUTROPHILS NFR BLD: 51.5 % (ref 38–73)
NRBC BLD-RTO: 0 /100 WBC
PHOSPHATE SERPL-MCNC: 3.6 MG/DL (ref 2.7–4.5)
PLATELET # BLD AUTO: 185 K/UL (ref 150–350)
PMV BLD AUTO: 10.9 FL (ref 9.2–12.9)
POC ACTIVATED CLOTTING TIME K: 274 SEC (ref 74–137)
POCT GLUCOSE: 119 MG/DL (ref 70–110)
POCT GLUCOSE: 143 MG/DL (ref 70–110)
POTASSIUM SERPL-SCNC: 3.7 MMOL/L (ref 3.5–5.1)
PROT SERPL-MCNC: 6.5 G/DL (ref 6–8.4)
PV PEAK VELOCITY: 114.4 CM/S
RBC # BLD AUTO: 3.65 M/UL (ref 4–5.4)
RIGHT VENTRICULAR END-DIASTOLIC DIMENSION: 137 CM
SAMPLE: ABNORMAL
SODIUM SERPL-SCNC: 143 MMOL/L (ref 136–145)
TDI LATERAL: 0.08 M/S
TDI SEPTAL: 0.06 M/S
TDI: 0.07 M/S
TROPONIN I SERPL DL<=0.01 NG/ML-MCNC: 0.95 NG/ML (ref 0–0.03)
TROPONIN I SERPL DL<=0.01 NG/ML-MCNC: 1.01 NG/ML (ref 0–0.03)
WBC # BLD AUTO: 7.76 K/UL (ref 3.9–12.7)

## 2020-02-14 PROCEDURE — 94761 N-INVAS EAR/PLS OXIMETRY MLT: CPT

## 2020-02-14 PROCEDURE — 27800903 OPTIME MED/SURG SUP & DEVICES OTHER IMPLANTS: Performed by: INTERNAL MEDICINE

## 2020-02-14 PROCEDURE — 25000003 PHARM REV CODE 250: Performed by: INTERNAL MEDICINE

## 2020-02-14 PROCEDURE — S5010 5% DEXTROSE AND 0.45% SALINE: HCPCS | Performed by: INTERNAL MEDICINE

## 2020-02-14 PROCEDURE — C1894 INTRO/SHEATH, NON-LASER: HCPCS | Performed by: INTERNAL MEDICINE

## 2020-02-14 PROCEDURE — 99153 MOD SED SAME PHYS/QHP EA: CPT | Performed by: INTERNAL MEDICINE

## 2020-02-14 PROCEDURE — C9600 PERC DRUG-EL COR STENT SING: HCPCS | Mod: RC

## 2020-02-14 PROCEDURE — 25500020 PHARM REV CODE 255: Performed by: INTERNAL MEDICINE

## 2020-02-14 PROCEDURE — G0379 DIRECT REFER HOSPITAL OBSERV: HCPCS

## 2020-02-14 PROCEDURE — 83735 ASSAY OF MAGNESIUM: CPT

## 2020-02-14 PROCEDURE — 93005 ELECTROCARDIOGRAM TRACING: CPT

## 2020-02-14 PROCEDURE — 84484 ASSAY OF TROPONIN QUANT: CPT | Mod: 91

## 2020-02-14 PROCEDURE — C1769 GUIDE WIRE: HCPCS | Performed by: INTERNAL MEDICINE

## 2020-02-14 PROCEDURE — 84100 ASSAY OF PHOSPHORUS: CPT

## 2020-02-14 PROCEDURE — 84484 ASSAY OF TROPONIN QUANT: CPT

## 2020-02-14 PROCEDURE — G0378 HOSPITAL OBSERVATION PER HR: HCPCS

## 2020-02-14 PROCEDURE — 25000003 PHARM REV CODE 250: Performed by: EMERGENCY MEDICINE

## 2020-02-14 PROCEDURE — C1760 CLOSURE DEV, VASC: HCPCS | Performed by: INTERNAL MEDICINE

## 2020-02-14 PROCEDURE — 85025 COMPLETE CBC W/AUTO DIFF WBC: CPT

## 2020-02-14 PROCEDURE — C1725 CATH, TRANSLUMIN NON-LASER: HCPCS | Performed by: INTERNAL MEDICINE

## 2020-02-14 PROCEDURE — 63600175 PHARM REV CODE 636 W HCPCS: Performed by: INTERNAL MEDICINE

## 2020-02-14 PROCEDURE — 93458 L HRT ARTERY/VENTRICLE ANGIO: CPT | Mod: XU

## 2020-02-14 PROCEDURE — 80053 COMPREHEN METABOLIC PANEL: CPT

## 2020-02-14 PROCEDURE — 93306 TTE W/DOPPLER COMPLETE: CPT

## 2020-02-14 PROCEDURE — 99152 MOD SED SAME PHYS/QHP 5/>YRS: CPT | Performed by: INTERNAL MEDICINE

## 2020-02-14 PROCEDURE — 36415 COLL VENOUS BLD VENIPUNCTURE: CPT

## 2020-02-14 PROCEDURE — 82962 GLUCOSE BLOOD TEST: CPT

## 2020-02-14 PROCEDURE — C1874 STENT, COATED/COV W/DEL SYS: HCPCS | Performed by: INTERNAL MEDICINE

## 2020-02-14 PROCEDURE — C1887 CATHETER, GUIDING: HCPCS | Performed by: INTERNAL MEDICINE

## 2020-02-14 DEVICE — STENT RONYX25015UX RESOLUTE ONYX 2.50X15
Type: IMPLANTABLE DEVICE | Site: CORONARY | Status: FUNCTIONAL
Brand: RESOLUTE ONYX™

## 2020-02-14 DEVICE — IMPLANTABLE DEVICE: Type: IMPLANTABLE DEVICE | Site: GROIN | Status: FUNCTIONAL

## 2020-02-14 RX ORDER — MORPHINE SULFATE 2 MG/ML
2 INJECTION, SOLUTION INTRAMUSCULAR; INTRAVENOUS ONCE
Status: DISCONTINUED | OUTPATIENT
Start: 2020-02-14 | End: 2020-02-14

## 2020-02-14 RX ORDER — GLUCAGON 1 MG
1 KIT INJECTION
Status: DISCONTINUED | OUTPATIENT
Start: 2020-02-14 | End: 2020-02-16 | Stop reason: HOSPADM

## 2020-02-14 RX ORDER — ATORVASTATIN CALCIUM 40 MG/1
80 TABLET, FILM COATED ORAL NIGHTLY
Status: DISCONTINUED | OUTPATIENT
Start: 2020-02-14 | End: 2020-02-16 | Stop reason: HOSPADM

## 2020-02-14 RX ORDER — ONDANSETRON 2 MG/ML
4 INJECTION INTRAMUSCULAR; INTRAVENOUS EVERY 12 HOURS PRN
Status: DISCONTINUED | OUTPATIENT
Start: 2020-02-14 | End: 2020-02-16 | Stop reason: HOSPADM

## 2020-02-14 RX ORDER — ONDANSETRON 2 MG/ML
4 INJECTION INTRAMUSCULAR; INTRAVENOUS EVERY 8 HOURS PRN
Status: CANCELLED | OUTPATIENT
Start: 2020-02-14

## 2020-02-14 RX ORDER — IBUPROFEN 200 MG
16 TABLET ORAL
Status: CANCELLED | OUTPATIENT
Start: 2020-02-14

## 2020-02-14 RX ORDER — TALC
9 POWDER (GRAM) TOPICAL NIGHTLY PRN
Status: CANCELLED | OUTPATIENT
Start: 2020-02-14

## 2020-02-14 RX ORDER — FLUOXETINE HYDROCHLORIDE 20 MG/1
20 CAPSULE ORAL DAILY
Status: CANCELLED | OUTPATIENT
Start: 2020-02-14

## 2020-02-14 RX ORDER — LANOLIN ALCOHOL/MO/W.PET/CERES
800 CREAM (GRAM) TOPICAL
Status: DISCONTINUED | OUTPATIENT
Start: 2020-02-14 | End: 2020-02-16 | Stop reason: HOSPADM

## 2020-02-14 RX ORDER — MIRTAZAPINE 15 MG/1
30 TABLET, FILM COATED ORAL NIGHTLY
Status: CANCELLED | OUTPATIENT
Start: 2020-02-14

## 2020-02-14 RX ORDER — CLOPIDOGREL BISULFATE 75 MG/1
300 TABLET ORAL ONCE
Status: DISCONTINUED | OUTPATIENT
Start: 2020-02-14 | End: 2020-02-14

## 2020-02-14 RX ORDER — POTASSIUM CHLORIDE 20 MEQ/15ML
40 SOLUTION ORAL
Status: CANCELLED | OUTPATIENT
Start: 2020-02-14

## 2020-02-14 RX ORDER — LANOLIN ALCOHOL/MO/W.PET/CERES
800 CREAM (GRAM) TOPICAL
Status: CANCELLED | OUTPATIENT
Start: 2020-02-14

## 2020-02-14 RX ORDER — VERAPAMIL HYDROCHLORIDE 2.5 MG/ML
INJECTION, SOLUTION INTRAVENOUS
Status: DISCONTINUED | OUTPATIENT
Start: 2020-02-14 | End: 2020-02-14 | Stop reason: HOSPADM

## 2020-02-14 RX ORDER — POTASSIUM CHLORIDE 20 MEQ/15ML
40 SOLUTION ORAL
Status: DISCONTINUED | OUTPATIENT
Start: 2020-02-14 | End: 2020-02-16 | Stop reason: HOSPADM

## 2020-02-14 RX ORDER — FLUOXETINE HYDROCHLORIDE 20 MG/1
40 CAPSULE ORAL DAILY
Status: DISCONTINUED | OUTPATIENT
Start: 2020-02-14 | End: 2020-02-16 | Stop reason: HOSPADM

## 2020-02-14 RX ORDER — FLUOXETINE HYDROCHLORIDE 20 MG/1
20 CAPSULE ORAL DAILY
Status: DISCONTINUED | OUTPATIENT
Start: 2020-02-15 | End: 2020-02-14

## 2020-02-14 RX ORDER — LORAZEPAM 2 MG/ML
INJECTION INTRAMUSCULAR
Status: DISCONTINUED | OUTPATIENT
Start: 2020-02-14 | End: 2020-02-14 | Stop reason: HOSPADM

## 2020-02-14 RX ORDER — TALC
9 POWDER (GRAM) TOPICAL NIGHTLY PRN
Status: DISCONTINUED | OUTPATIENT
Start: 2020-02-14 | End: 2020-02-16 | Stop reason: HOSPADM

## 2020-02-14 RX ORDER — CLOPIDOGREL BISULFATE 75 MG/1
150 TABLET ORAL ONCE
Status: COMPLETED | OUTPATIENT
Start: 2020-02-14 | End: 2020-02-14

## 2020-02-14 RX ORDER — NITROGLYCERIN 0.4 MG/1
0.4 TABLET SUBLINGUAL EVERY 5 MIN PRN
Status: DISCONTINUED | OUTPATIENT
Start: 2020-02-14 | End: 2020-02-16 | Stop reason: HOSPADM

## 2020-02-14 RX ORDER — ACETAMINOPHEN 325 MG/1
650 TABLET ORAL EVERY 4 HOURS PRN
Status: DISCONTINUED | OUTPATIENT
Start: 2020-02-14 | End: 2020-02-16 | Stop reason: HOSPADM

## 2020-02-14 RX ORDER — HYDROCODONE BITARTRATE AND ACETAMINOPHEN 10; 325 MG/1; MG/1
1 TABLET ORAL EVERY 4 HOURS PRN
Status: DISCONTINUED | OUTPATIENT
Start: 2020-02-14 | End: 2020-02-16 | Stop reason: HOSPADM

## 2020-02-14 RX ORDER — SODIUM CHLORIDE 0.9 % (FLUSH) 0.9 %
10 SYRINGE (ML) INJECTION
Status: DISCONTINUED | OUTPATIENT
Start: 2020-02-14 | End: 2020-02-16 | Stop reason: HOSPADM

## 2020-02-14 RX ORDER — AMOXICILLIN 250 MG
1 CAPSULE ORAL 2 TIMES DAILY
Status: CANCELLED | OUTPATIENT
Start: 2020-02-14

## 2020-02-14 RX ORDER — LIDOCAINE HYDROCHLORIDE 10 MG/ML
INJECTION, SOLUTION EPIDURAL; INFILTRATION; INTRACAUDAL; PERINEURAL
Status: DISCONTINUED | OUTPATIENT
Start: 2020-02-14 | End: 2020-02-14 | Stop reason: HOSPADM

## 2020-02-14 RX ORDER — GLUCAGON 1 MG
1 KIT INJECTION
Status: CANCELLED | OUTPATIENT
Start: 2020-02-14

## 2020-02-14 RX ORDER — SODIUM CHLORIDE 9 MG/ML
INJECTION, SOLUTION INTRAVENOUS ONCE
Status: DISCONTINUED | OUTPATIENT
Start: 2020-02-14 | End: 2020-02-14

## 2020-02-14 RX ORDER — ATORVASTATIN CALCIUM 40 MG/1
80 TABLET, FILM COATED ORAL NIGHTLY
Status: DISCONTINUED | OUTPATIENT
Start: 2020-02-14 | End: 2020-02-14 | Stop reason: HOSPADM

## 2020-02-14 RX ORDER — LORAZEPAM 2 MG/ML
1 INJECTION INTRAMUSCULAR
Status: DISCONTINUED | OUTPATIENT
Start: 2020-02-14 | End: 2020-02-14

## 2020-02-14 RX ORDER — HYDROCODONE BITARTRATE AND ACETAMINOPHEN 5; 325 MG/1; MG/1
1 TABLET ORAL EVERY 4 HOURS PRN
Status: DISCONTINUED | OUTPATIENT
Start: 2020-02-14 | End: 2020-02-16 | Stop reason: HOSPADM

## 2020-02-14 RX ORDER — ATORVASTATIN CALCIUM 40 MG/1
80 TABLET, FILM COATED ORAL NIGHTLY
Status: CANCELLED | OUTPATIENT
Start: 2020-02-14

## 2020-02-14 RX ORDER — HEPARIN SODIUM 10000 [USP'U]/ML
INJECTION, SOLUTION INTRAVENOUS; SUBCUTANEOUS
Status: DISCONTINUED | OUTPATIENT
Start: 2020-02-14 | End: 2020-02-14 | Stop reason: HOSPADM

## 2020-02-14 RX ORDER — MIDAZOLAM HYDROCHLORIDE 1 MG/ML
INJECTION INTRAMUSCULAR; INTRAVENOUS
Status: DISCONTINUED | OUTPATIENT
Start: 2020-02-14 | End: 2020-02-14 | Stop reason: HOSPADM

## 2020-02-14 RX ORDER — CLOPIDOGREL BISULFATE 75 MG/1
TABLET ORAL
Status: DISCONTINUED | OUTPATIENT
Start: 2020-02-14 | End: 2020-02-14 | Stop reason: HOSPADM

## 2020-02-14 RX ORDER — CLOPIDOGREL BISULFATE 75 MG/1
75 TABLET ORAL DAILY
Status: DISCONTINUED | OUTPATIENT
Start: 2020-02-15 | End: 2020-02-16 | Stop reason: HOSPADM

## 2020-02-14 RX ORDER — ACETAMINOPHEN 325 MG/1
650 TABLET ORAL EVERY 6 HOURS PRN
Status: CANCELLED | OUTPATIENT
Start: 2020-02-14

## 2020-02-14 RX ORDER — FENTANYL CITRATE 50 UG/ML
INJECTION, SOLUTION INTRAMUSCULAR; INTRAVENOUS
Status: DISCONTINUED | OUTPATIENT
Start: 2020-02-14 | End: 2020-02-14 | Stop reason: HOSPADM

## 2020-02-14 RX ORDER — INSULIN ASPART 100 [IU]/ML
0-5 INJECTION, SOLUTION INTRAVENOUS; SUBCUTANEOUS
Status: CANCELLED | OUTPATIENT
Start: 2020-02-14

## 2020-02-14 RX ORDER — GABAPENTIN 400 MG/1
400 CAPSULE ORAL 3 TIMES DAILY
Status: CANCELLED | OUTPATIENT
Start: 2020-02-14

## 2020-02-14 RX ORDER — AMOXICILLIN 250 MG
1 CAPSULE ORAL 2 TIMES DAILY
Status: DISCONTINUED | OUTPATIENT
Start: 2020-02-14 | End: 2020-02-14

## 2020-02-14 RX ORDER — MIRTAZAPINE 15 MG/1
30 TABLET, FILM COATED ORAL NIGHTLY
Status: DISCONTINUED | OUTPATIENT
Start: 2020-02-14 | End: 2020-02-14

## 2020-02-14 RX ORDER — NITROGLYCERIN 0.4 MG/1
0.4 TABLET SUBLINGUAL EVERY 5 MIN PRN
Status: CANCELLED | OUTPATIENT
Start: 2020-02-14

## 2020-02-14 RX ORDER — AMOXICILLIN 250 MG
1 CAPSULE ORAL 2 TIMES DAILY
Status: DISCONTINUED | OUTPATIENT
Start: 2020-02-14 | End: 2020-02-16 | Stop reason: HOSPADM

## 2020-02-14 RX ORDER — NAPROXEN SODIUM 220 MG/1
81 TABLET, FILM COATED ORAL DAILY
Status: CANCELLED | OUTPATIENT
Start: 2020-02-14

## 2020-02-14 RX ORDER — DEXTROSE MONOHYDRATE AND SODIUM CHLORIDE 5; .45 G/100ML; G/100ML
INJECTION, SOLUTION INTRAVENOUS CONTINUOUS
Status: CANCELLED | OUTPATIENT
Start: 2020-02-14

## 2020-02-14 RX ORDER — METOPROLOL TARTRATE 25 MG/1
12.5 TABLET ORAL 2 TIMES DAILY
Status: CANCELLED | OUTPATIENT
Start: 2020-02-14

## 2020-02-14 RX ORDER — IBUPROFEN 200 MG
24 TABLET ORAL
Status: CANCELLED | OUTPATIENT
Start: 2020-02-14

## 2020-02-14 RX ORDER — ACETAMINOPHEN 325 MG/1
650 TABLET ORAL EVERY 4 HOURS PRN
Status: DISCONTINUED | OUTPATIENT
Start: 2020-02-14 | End: 2020-02-14

## 2020-02-14 RX ORDER — IBUPROFEN 200 MG
16 TABLET ORAL
Status: DISCONTINUED | OUTPATIENT
Start: 2020-02-14 | End: 2020-02-16 | Stop reason: HOSPADM

## 2020-02-14 RX ORDER — PANTOPRAZOLE SODIUM 40 MG/1
40 TABLET, DELAYED RELEASE ORAL DAILY
Status: CANCELLED | OUTPATIENT
Start: 2020-02-14

## 2020-02-14 RX ORDER — SODIUM CHLORIDE 450 MG/100ML
INJECTION, SOLUTION INTRAVENOUS CONTINUOUS
Status: DISCONTINUED | OUTPATIENT
Start: 2020-02-14 | End: 2020-02-15

## 2020-02-14 RX ORDER — CLONAZEPAM 0.5 MG/1
0.5 TABLET ORAL NIGHTLY PRN
Status: DISCONTINUED | OUTPATIENT
Start: 2020-02-14 | End: 2020-02-15

## 2020-02-14 RX ORDER — ONDANSETRON 2 MG/ML
4 INJECTION INTRAMUSCULAR; INTRAVENOUS EVERY 6 HOURS PRN
Status: DISCONTINUED | OUTPATIENT
Start: 2020-02-14 | End: 2020-02-16 | Stop reason: HOSPADM

## 2020-02-14 RX ORDER — DEXTROSE MONOHYDRATE AND SODIUM CHLORIDE 5; .45 G/100ML; G/100ML
INJECTION, SOLUTION INTRAVENOUS CONTINUOUS
Status: DISCONTINUED | OUTPATIENT
Start: 2020-02-14 | End: 2020-02-14

## 2020-02-14 RX ORDER — PANTOPRAZOLE SODIUM 40 MG/1
40 TABLET, DELAYED RELEASE ORAL
Status: DISCONTINUED | OUTPATIENT
Start: 2020-02-15 | End: 2020-02-16 | Stop reason: HOSPADM

## 2020-02-14 RX ORDER — CLONAZEPAM 0.5 MG/1
0.5 TABLET ORAL NIGHTLY PRN
Status: CANCELLED | OUTPATIENT
Start: 2020-02-14

## 2020-02-14 RX ORDER — HYDROMORPHONE HYDROCHLORIDE 1 MG/ML
INJECTION, SOLUTION INTRAMUSCULAR; INTRAVENOUS; SUBCUTANEOUS
Status: DISCONTINUED | OUTPATIENT
Start: 2020-02-14 | End: 2020-02-14 | Stop reason: HOSPADM

## 2020-02-14 RX ORDER — METOPROLOL TARTRATE 25 MG/1
12.5 TABLET ORAL 2 TIMES DAILY
Status: DISCONTINUED | OUTPATIENT
Start: 2020-02-14 | End: 2020-02-16 | Stop reason: HOSPADM

## 2020-02-14 RX ORDER — TALC
8 POWDER (GRAM) TOPICAL NIGHTLY PRN
Status: DISCONTINUED | OUTPATIENT
Start: 2020-02-14 | End: 2020-02-14

## 2020-02-14 RX ORDER — SODIUM CHLORIDE 0.9 % (FLUSH) 0.9 %
10 SYRINGE (ML) INJECTION
Status: CANCELLED | OUTPATIENT
Start: 2020-02-14

## 2020-02-14 RX ORDER — NAPROXEN SODIUM 220 MG/1
81 TABLET, FILM COATED ORAL DAILY
Status: DISCONTINUED | OUTPATIENT
Start: 2020-02-15 | End: 2020-02-16 | Stop reason: HOSPADM

## 2020-02-14 RX ORDER — POTASSIUM CHLORIDE 20 MEQ/15ML
60 SOLUTION ORAL
Status: CANCELLED | OUTPATIENT
Start: 2020-02-14

## 2020-02-14 RX ORDER — DEXTROSE MONOHYDRATE AND SODIUM CHLORIDE 5; .45 G/100ML; G/100ML
INJECTION, SOLUTION INTRAVENOUS CONTINUOUS
Status: DISCONTINUED | OUTPATIENT
Start: 2020-02-14 | End: 2020-02-14 | Stop reason: HOSPADM

## 2020-02-14 RX ORDER — INSULIN ASPART 100 [IU]/ML
0-5 INJECTION, SOLUTION INTRAVENOUS; SUBCUTANEOUS
Status: DISCONTINUED | OUTPATIENT
Start: 2020-02-14 | End: 2020-02-16 | Stop reason: HOSPADM

## 2020-02-14 RX ORDER — ATENOLOL 25 MG/1
25 TABLET ORAL DAILY
Status: DISCONTINUED | OUTPATIENT
Start: 2020-02-14 | End: 2020-02-14

## 2020-02-14 RX ORDER — POTASSIUM CHLORIDE 20 MEQ/15ML
60 SOLUTION ORAL
Status: DISCONTINUED | OUTPATIENT
Start: 2020-02-14 | End: 2020-02-16 | Stop reason: HOSPADM

## 2020-02-14 RX ORDER — ONDANSETRON 2 MG/ML
4 INJECTION INTRAMUSCULAR; INTRAVENOUS EVERY 8 HOURS PRN
Status: DISCONTINUED | OUTPATIENT
Start: 2020-02-14 | End: 2020-02-14

## 2020-02-14 RX ORDER — NITROGLYCERIN 5 MG/ML
INJECTION, SOLUTION INTRAVENOUS
Status: DISCONTINUED | OUTPATIENT
Start: 2020-02-14 | End: 2020-02-14 | Stop reason: HOSPADM

## 2020-02-14 RX ORDER — IBUPROFEN 200 MG
24 TABLET ORAL
Status: DISCONTINUED | OUTPATIENT
Start: 2020-02-14 | End: 2020-02-16 | Stop reason: HOSPADM

## 2020-02-14 RX ORDER — ACETAMINOPHEN 325 MG/1
650 TABLET ORAL EVERY 6 HOURS PRN
Status: DISCONTINUED | OUTPATIENT
Start: 2020-02-14 | End: 2020-02-16 | Stop reason: HOSPADM

## 2020-02-14 RX ADMIN — ASPIRIN 81 MG 81 MG: 81 TABLET ORAL at 08:02

## 2020-02-14 RX ADMIN — GABAPENTIN 400 MG: 300 CAPSULE ORAL at 06:02

## 2020-02-14 RX ADMIN — FLUOXETINE 20 MG: 20 CAPSULE ORAL at 08:02

## 2020-02-14 RX ADMIN — METOPROLOL TARTRATE 12.5 MG: 25 TABLET, FILM COATED ORAL at 08:02

## 2020-02-14 RX ADMIN — HYDROCODONE BITARTRATE AND ACETAMINOPHEN 1 TABLET: 10; 325 TABLET ORAL at 05:02

## 2020-02-14 RX ADMIN — NITROGLYCERIN 0.5 INCH: 20 OINTMENT TOPICAL at 05:02

## 2020-02-14 RX ADMIN — ATORVASTATIN CALCIUM 80 MG: 40 TABLET, FILM COATED ORAL at 08:02

## 2020-02-14 RX ADMIN — HYDROCODONE BITARTRATE AND ACETAMINOPHEN 1 TABLET: 5; 325 TABLET ORAL at 11:02

## 2020-02-14 RX ADMIN — ACETAMINOPHEN 650 MG: 325 TABLET ORAL at 04:02

## 2020-02-14 RX ADMIN — DEXTROSE AND SODIUM CHLORIDE: 5; .45 INJECTION, SOLUTION INTRAVENOUS at 08:02

## 2020-02-14 RX ADMIN — CLOPIDOGREL BISULFATE 150 MG: 75 TABLET, FILM COATED ORAL at 05:02

## 2020-02-14 RX ADMIN — SODIUM CHLORIDE: 0.45 INJECTION, SOLUTION INTRAVENOUS at 04:02

## 2020-02-14 RX ADMIN — NITROGLYCERIN 0.5 INCH: 20 OINTMENT TOPICAL at 12:02

## 2020-02-14 NOTE — Clinical Note
Catheter is repositioned to the ostium   left anterior descending. Angiography performed of the left coronary arteries in multiple views. Angiography performed via power injection with 8 mL contrast at 4 mL/s.

## 2020-02-14 NOTE — NURSING
Called and spoke with Lorena at transfer center per Dr. Shipman's request to transfer pt to cath lab facility.    0818  Spoke with Jessi at transfer center. Pt is to go to room 2530 at [facility]. Phone number for report is [phone]. Informed primary nurse.

## 2020-02-14 NOTE — H&P
[facility] Medicine  History & Physical    Patient Name: Queta Ocampo  MRN: [id number]  Admission Date: 2/13/2020  Attending Physician: Breanne Shipman MD   Primary Care Provider: Jean Alexander MD         Patient information was obtained from patient and ER records.     Subjective:     Principal Problem:NSTEMI (non-ST elevated myocardial infarction)    Chief Complaint:   Chief Complaint   Patient presents with    Chest Pain     for a couple of days now but gone since has gotten here. Associated SOB        HPI: Queta Ocampo is a 75 y.o. Female with past medical history of depression diabetes mellitus who presents to the ED with  on and off chest pain for the last few weeks particularly the last 2 weeks.  Patient states that the pain is pressure-like pain that comes on and lasts for few seconds to minutes and goes away.  Patient states that she has not tried nitroglycerin and hence does not know if that resolves the pain.  Patient states that the pain comes even at rest.  No positional change with respiration.  Patient has mild intensity pain usually but was having pain of intensity 8/10 earlier today.  Patient had chest pain just prior to my arrival last for few seconds and went away.  Patient states that it is not associated with food is not reproducible.  Patient states that pain is radiating to the back of the neck.  Patient states that in the last 2 weeks she has been under lot of stress due to her granddaughter losing her job.  Patient has been depressed more so after the loss of her sister 2 months ago.  Patient denies symptoms of fever, nausea, vomiting, shortness of breath.     The history is provided by the patient.      Past Medical History:   Diagnosis Date    Depression     Diabetes mellitus     type 2    Nervous breakdown     Renal disorder     Tachycardia        Past Surgical History:   Procedure Laterality Date    APPENDECTOMY      FOOT SURGERY      SHOULDER  SURGERY Left     TUBAL LIGATION         Review of patient's allergies indicates:   Allergen Reactions    Codeine Itching, Swelling, Rash, Other (See Comments) and Nausea And Vomiting     Facial swelling, itching, rash, erythema    Pcn [penicillins] Itching, Swelling, Rash and Other (See Comments)     Facial swelling with rash, erythema, itching       No current facility-administered medications on file prior to encounter.      Current Outpatient Medications on File Prior to Encounter   Medication Sig    atenoloL (TENORMIN) 25 MG tablet Take 25 mg by mouth once daily.    clonazePAM (KLONOPIN) 1 MG tablet Take 1 mg by mouth nightly as needed for Anxiety.    FLUoxetine 40 MG capsule Take 40 mg by mouth once daily.     gabapentin (NEURONTIN) 600 MG tablet Take 600 mg by mouth 3 (three) times daily.     SITagliptin (JANUVIA) 100 MG Tab Take 100 mg by mouth once daily.    [DISCONTINUED] DULoxetine (CYMBALTA) 60 MG capsule Take 60 mg by mouth once daily.     [DISCONTINUED] mirtazapine (REMERON) 30 MG tablet Take 30 mg by mouth every evening.     [DISCONTINUED] vortioxetine 20 mg Tab Take 20 mg by mouth once daily.      Family History     None        Tobacco Use    Smoking status: Current Every Day Smoker     Packs/day: 0.50     Types: Cigarettes    Smokeless tobacco: Never Used   Substance and Sexual Activity    Alcohol use: Not Currently    Drug use: Not Currently    Sexual activity: Not on file     Review of Systems   Constitutional: Negative for appetite change, chills, fatigue and fever.   HENT: Negative for hearing loss, postnasal drip, sore throat and trouble swallowing.    Eyes: Negative for photophobia and visual disturbance.   Respiratory: Positive for chest tightness and shortness of breath. Negative for cough and wheezing.         Had shortness of breath earlier   Cardiovascular: Negative for chest pain, palpitations and leg swelling.   Gastrointestinal: Negative for abdominal pain, diarrhea,  nausea and vomiting.   Endocrine: Negative for polydipsia, polyphagia and polyuria.   Genitourinary: Negative for dysuria, frequency and urgency.   Musculoskeletal: Negative for gait problem (Chronic, 2/2 peripheral neuropathy), neck pain and neck stiffness.   Skin: Negative for rash and wound.   Neurological: Negative for dizziness, syncope, weakness, light-headedness, numbness and headaches.   Hematological: Does not bruise/bleed easily.   Psychiatric/Behavioral: Negative for confusion. The patient is not nervous/anxious.      Objective:     Vital Signs (Most Recent):  Temp: 98.7 °F (37.1 °C) (02/13/20 1922)  Pulse: 72 (02/13/20 1922)  Resp: 18 (02/13/20 1922)  BP: (!) 170/71 (02/13/20 1922)  SpO2: 96 % (02/13/20 1922) Vital Signs (24h Range):  Temp:  [97 °F (36.1 °C)-98.7 °F (37.1 °C)] 98.7 °F (37.1 °C)  Pulse:  [70-81] 72  Resp:  [16-18] 18  SpO2:  [96 %-99 %] 96 %  BP: (126-170)/(63-89) 170/71     Weight: 68 kg (149 lb 14.6 oz)  Body mass index is 22.79 kg/m².    Physical Exam   Constitutional: She is oriented to person, place, and time. She appears well-developed and well-nourished. No distress.   HENT:   Head: Normocephalic and atraumatic.   Mouth/Throat: Oropharynx is clear and moist.   Eyes: Pupils are equal, round, and reactive to light. Conjunctivae and EOM are normal. No scleral icterus.   Neck: Normal range of motion. Neck supple. No tracheal deviation present. No thyromegaly present.   Cardiovascular: Normal rate, regular rhythm, normal heart sounds and intact distal pulses. Exam reveals no gallop and no friction rub.   No murmur heard.  Pulses:       Dorsalis pedis pulses are 2+ on the right side, and 2+ on the left side.   Pulmonary/Chest: Effort normal and breath sounds normal. No accessory muscle usage. No respiratory distress. She has no wheezes. She has no rhonchi. She has no rales.   Abdominal: Soft. Bowel sounds are normal. She exhibits no distension and no mass. There is no tenderness.    Musculoskeletal: Normal range of motion. She exhibits no edema, tenderness or deformity.   Neurological: She is alert and oriented to person, place, and time. She has normal strength. She exhibits normal muscle tone. Coordination normal.   Skin: Skin is warm, dry and intact. Capillary refill takes 2 to 3 seconds. No rash noted. She is not diaphoretic. No erythema.   Psychiatric: She has a normal mood and affect. Her speech is normal and behavior is normal. Thought content normal.   Vitals reviewed.        CRANIAL NERVES     CN III, IV, VI   Pupils are equal, round, and reactive to light.  Extraocular motions are normal.        Significant Labs:   BMP:   Recent Labs   Lab 02/13/20  1459   GLU 84      K 3.9      CO2 26   BUN 19   CREATININE 1.2   CALCIUM 9.5     CBC:   Recent Labs   Lab 02/13/20  1459   WBC 8.97   HGB 12.5   HCT 37.8          Significant Imaging: I have reviewed all pertinent imaging results/findings within the past 24 hours.    Assessment/Plan:     * NSTEMI (non-ST elevated myocardial infarction)  Will admit the patient   - ASA 81mg PO daily   - Atorvastatin 80mg PO daily, check lipid panel   - Consider Beta-blocker: Metoprolol 12.5mg PO bid (titrate to goal HR<70)   - NTG + morphine PRN chest pain or NTG drip if ongoing chest pain   -if troponin starts trending up will add Anti-coagulation (Heparin drip per ACS protocol)  Continue until catheterization or at least 48hrs if only medical management)    Prior to Discharge (core measures)  - Assess LV Function (i.e., TTEcho)  - ACE-I / ARB if EF<40% will also check potassium  - Eplerenone (or spironolactone) if new EF<40%   - Beta-blocker        Depression  Will continue home meds      Mass of right lung  History noted      Ataxia  History noted        VTE Risk Mitigation (From admission, onward)         Ordered     IP VTE LOW RISK PATIENT  Once      02/13/20 4963                   Breanne Shipman MD  Department of Hospital  Medicine   [facility]

## 2020-02-14 NOTE — ASSESSMENT & PLAN NOTE
Will admit the patient   - ASA 81mg PO daily   - Atorvastatin 80mg PO daily, check lipid panel   - Consider Beta-blocker: Metoprolol 12.5mg PO bid (titrate to goal HR<70)   - NTG + morphine PRN chest pain or NTG drip if ongoing chest pain   -if troponin starts trending up will add Anti-coagulation (Heparin drip per ACS protocol)  Continue until catheterization or at least 48hrs if only medical management)    Prior to Discharge (core measures)  - Assess LV Function (i.e., TTEcho)  - ACE-I / ARB if EF<40% will also check potassium  - Eplerenone (or spironolactone) if new EF<40%   - Beta-blocker

## 2020-02-14 NOTE — HPI
Queta Ocampo is a 75 y.o. Female with past medical history of depression diabetes mellitus who presents to the ED with  on and off chest pain for the last few weeks particularly the last 2 weeks.  Patient states that the pain is pressure-like pain that comes on and lasts for few seconds to minutes and goes away.  Patient states that she has not tried nitroglycerin and hence does not know if that resolves the pain.  Patient states that the pain comes even at rest.  No positional change with respiration.  Patient has mild intensity pain usually but was having pain of intensity 8/10 earlier today.  Patient had chest pain just prior to my arrival last for few seconds and went away.  Patient states that it is not associated with food is not reproducible.  Patient states that pain is radiating to the back of the neck.  Patient states that in the last 2 weeks she has been under lot of stress due to her granddaughter losing her job.  Patient has been depressed more so after the loss of her sister 2 months ago.  Patient denies symptoms of fever, nausea, vomiting, shortness of breath.     The history is provided by the patient.

## 2020-02-14 NOTE — NURSING
Patient transferred to [facility] via [facility] via [facility]

## 2020-02-14 NOTE — Clinical Note
Catheter is removed from the ostium   right coronary artery. Angiography performed of the right coronary arteries. Angiography performed via power injection with 6 mL contrast at 3 mL/sPower injection PSI was 300..

## 2020-02-14 NOTE — Clinical Note
Catheter is repositioned to the ostium   right coronary artery. Angiography performed of the right coronary arteries in multiple views. Angiography performed via power injection with 6 mL contrast at 3 mL/s. Unable to access and unable to engage.Exchange for JR4 Catheter

## 2020-02-14 NOTE — NURSING
Per MD Qamar,if pt has further elevation in troponin level would recommend transfer to [facility] for angiogram in the morning.

## 2020-02-14 NOTE — SUBJECTIVE & OBJECTIVE
Past Medical History:   Diagnosis Date    Depression     Diabetes mellitus     type 2    Nervous breakdown     Renal disorder     Tachycardia        Past Surgical History:   Procedure Laterality Date    APPENDECTOMY      FOOT SURGERY      SHOULDER SURGERY Left     TUBAL LIGATION         Review of patient's allergies indicates:   Allergen Reactions    Codeine Itching, Swelling, Rash, Other (See Comments) and Nausea And Vomiting     Facial swelling, itching, rash, erythema    Pcn [penicillins] Itching, Swelling, Rash and Other (See Comments)     Facial swelling with rash, erythema, itching       No current facility-administered medications on file prior to encounter.      Current Outpatient Medications on File Prior to Encounter   Medication Sig    atenoloL (TENORMIN) 25 MG tablet Take 25 mg by mouth once daily.    clonazePAM (KLONOPIN) 1 MG tablet Take 1 mg by mouth nightly as needed for Anxiety.    FLUoxetine 40 MG capsule Take 40 mg by mouth once daily.     gabapentin (NEURONTIN) 600 MG tablet Take 600 mg by mouth 3 (three) times daily.     SITagliptin (JANUVIA) 100 MG Tab Take 100 mg by mouth once daily.    [DISCONTINUED] DULoxetine (CYMBALTA) 60 MG capsule Take 60 mg by mouth once daily.     [DISCONTINUED] mirtazapine (REMERON) 30 MG tablet Take 30 mg by mouth every evening.     [DISCONTINUED] vortioxetine 20 mg Tab Take 20 mg by mouth once daily.      Family History     None        Tobacco Use    Smoking status: Current Every Day Smoker     Packs/day: 0.50     Types: Cigarettes    Smokeless tobacco: Never Used   Substance and Sexual Activity    Alcohol use: Not Currently    Drug use: Not Currently    Sexual activity: Not on file     Review of Systems   Constitutional: Negative for appetite change, chills, fatigue and fever.   HENT: Negative for hearing loss, postnasal drip, sore throat and trouble swallowing.    Eyes: Negative for photophobia and visual disturbance.   Respiratory:  Positive for chest tightness and shortness of breath. Negative for cough and wheezing.         Had shortness of breath earlier   Cardiovascular: Negative for chest pain, palpitations and leg swelling.   Gastrointestinal: Negative for abdominal pain, diarrhea, nausea and vomiting.   Endocrine: Negative for polydipsia, polyphagia and polyuria.   Genitourinary: Negative for dysuria, frequency and urgency.   Musculoskeletal: Negative for gait problem (Chronic, 2/2 peripheral neuropathy), neck pain and neck stiffness.   Skin: Negative for rash and wound.   Neurological: Negative for dizziness, syncope, weakness, light-headedness, numbness and headaches.   Hematological: Does not bruise/bleed easily.   Psychiatric/Behavioral: Negative for confusion. The patient is not nervous/anxious.      Objective:     Vital Signs (Most Recent):  Temp: 98.7 °F (37.1 °C) (02/13/20 1922)  Pulse: 72 (02/13/20 1922)  Resp: 18 (02/13/20 1922)  BP: (!) 170/71 (02/13/20 1922)  SpO2: 96 % (02/13/20 1922) Vital Signs (24h Range):  Temp:  [97 °F (36.1 °C)-98.7 °F (37.1 °C)] 98.7 °F (37.1 °C)  Pulse:  [70-81] 72  Resp:  [16-18] 18  SpO2:  [96 %-99 %] 96 %  BP: (126-170)/(63-89) 170/71     Weight: 68 kg (149 lb 14.6 oz)  Body mass index is 22.79 kg/m².    Physical Exam   Constitutional: She is oriented to person, place, and time. She appears well-developed and well-nourished. No distress.   HENT:   Head: Normocephalic and atraumatic.   Mouth/Throat: Oropharynx is clear and moist.   Eyes: Pupils are equal, round, and reactive to light. Conjunctivae and EOM are normal. No scleral icterus.   Neck: Normal range of motion. Neck supple. No tracheal deviation present. No thyromegaly present.   Cardiovascular: Normal rate, regular rhythm, normal heart sounds and intact distal pulses. Exam reveals no gallop and no friction rub.   No murmur heard.  Pulses:       Dorsalis pedis pulses are 2+ on the right side, and 2+ on the left side.   Pulmonary/Chest:  Effort normal and breath sounds normal. No accessory muscle usage. No respiratory distress. She has no wheezes. She has no rhonchi. She has no rales.   Abdominal: Soft. Bowel sounds are normal. She exhibits no distension and no mass. There is no tenderness.   Musculoskeletal: Normal range of motion. She exhibits no edema, tenderness or deformity.   Neurological: She is alert and oriented to person, place, and time. She has normal strength. She exhibits normal muscle tone. Coordination normal.   Skin: Skin is warm, dry and intact. Capillary refill takes 2 to 3 seconds. No rash noted. She is not diaphoretic. No erythema.   Psychiatric: She has a normal mood and affect. Her speech is normal and behavior is normal. Thought content normal.   Vitals reviewed.        CRANIAL NERVES     CN III, IV, VI   Pupils are equal, round, and reactive to light.  Extraocular motions are normal.        Significant Labs:   BMP:   Recent Labs   Lab 02/13/20  1459   GLU 84      K 3.9      CO2 26   BUN 19   CREATININE 1.2   CALCIUM 9.5     CBC:   Recent Labs   Lab 02/13/20  1459   WBC 8.97   HGB 12.5   HCT 37.8          Significant Imaging: I have reviewed all pertinent imaging results/findings within the past 24 hours.

## 2020-02-14 NOTE — NURSING
Call back from MD Qamar,if third troponin level comes back higher will transfer to [facility] in the morning,give Lovenox injection 1mg now.

## 2020-02-14 NOTE — NURSING
Report called to codie Garnett at [facility]. Patient to be transferred to [facility] room 0831

## 2020-02-14 NOTE — PLAN OF CARE
POC reviewed with pt,continue to monitor on tele,monitor lab results,blood glucose monitoring,prepare for transfer to [facility] in the morning if third troponin level increases,maintain NPO status after midnight,strict I&Os,weigh daily,no c/o pain noted,pt will remain free from falls throughout shift,safety maintained,will continue to monitor.

## 2020-02-14 NOTE — CONSULTS
[facility]  Cardiology  Consult Note    Patient Name: Queta Ocampo  MRN: [id number]  Admission Date: 2020  Hospital Length of Stay: 0 days  Code Status: Full Code   Attending Provider: Breanne Shipman MD   Consulting Provider: Edwin Cervantes MD  Primary Care Physician: Jean Alexander MD  Principal Problem:<principal problem not specified>    Patient information was obtained from patient and ER records.     Inpatient consult to Cardiology  Consult performed by: Edwin Cervantes MD  Consult ordered by: Azar Butler III, MD        Subjective:     Chief Complaint:  Chest pain     HPI:  75-year-old lady admitted to hospital with 3 day history of on and off chest pain without any exertion.  Patient unable to see any aggravating factors.  It is located in the lower left retrosternal area with some radiation to the back.  She describes it as aggravating pain and does not last longer than 5 min.  She was advised by her granddaughter to go to the hospital and get checked up.  She has long history of irregular heartbeat and was placed on at anal all by Dr. Mcdonnell.  She has not seen her cardiologist in few years.  Strong family history an older sister recently  of block coronary arteries and could not be operated on.  Apparently her daughter was in her late 40s has had stents placed.  Patient is current smoker.  She is known to have diabetes.  She reports being depressed since passing of her sister about 2 months ago.      Past Medical History:   Diagnosis Date    Depression     Diabetes mellitus     type 2    Nervous breakdown     Renal disorder     Tachycardia        Past Surgical History:   Procedure Laterality Date    APPENDECTOMY      FOOT SURGERY      SHOULDER SURGERY Left     TUBAL LIGATION         Review of patient's allergies indicates:   Allergen Reactions    Codeine Itching, Swelling, Rash, Other (See Comments) and Nausea And Vomiting     Facial swelling,  itching, rash, erythema    Pcn [penicillins] Itching, Swelling, Rash and Other (See Comments)     Facial swelling with rash, erythema, itching       No current facility-administered medications on file prior to encounter.      Current Outpatient Medications on File Prior to Encounter   Medication Sig    atenoloL (TENORMIN) 25 MG tablet Take 25 mg by mouth once daily.    clonazePAM (KLONOPIN) 1 MG tablet Take 1 mg by mouth nightly as needed for Anxiety.    FLUoxetine 40 MG capsule Take 40 mg by mouth once daily.     gabapentin (NEURONTIN) 600 MG tablet Take 600 mg by mouth 3 (three) times daily.     SITagliptin (JANUVIA) 100 MG Tab Take 100 mg by mouth once daily.    [DISCONTINUED] DULoxetine (CYMBALTA) 60 MG capsule Take 60 mg by mouth once daily.     [DISCONTINUED] mirtazapine (REMERON) 30 MG tablet Take 30 mg by mouth every evening.     [DISCONTINUED] vortioxetine 20 mg Tab Take 20 mg by mouth once daily.      Family History     None        Tobacco Use    Smoking status: Current Every Day Smoker     Packs/day: 0.50     Types: Cigarettes    Smokeless tobacco: Never Used   Substance and Sexual Activity    Alcohol use: Not Currently    Drug use: Not Currently    Sexual activity: Not on file     ROS  Objective:     Vital Signs (Most Recent):  Temp: 98.7 °F (37.1 °C) (02/13/20 1922)  Pulse: 72 (02/13/20 1922)  Resp: 18 (02/13/20 1922)  BP: (!) 170/71 (02/13/20 1922)  SpO2: 96 % (02/13/20 1922) Vital Signs (24h Range):  Temp:  [97 °F (36.1 °C)-98.7 °F (37.1 °C)] 98.7 °F (37.1 °C)  Pulse:  [70-81] 72  Resp:  [16-18] 18  SpO2:  [96 %-99 %] 96 %  BP: (126-170)/(63-89) 170/71     Weight: 68 kg (149 lb 14.6 oz)  Body mass index is 22.79 kg/m².    SpO2: 96 %  O2 Device (Oxygen Therapy): room air    No intake or output data in the 24 hours ending 02/13/20 1949    Lines/Drains/Airways     Peripheral Intravenous Line                 Peripheral IV - Single Lumen 02/13/20 1530 20 G Left Forearm less than 1 day                 Physical Exam   Constitutional: She is oriented to person, place, and time. She appears well-developed and well-nourished. No distress.   Eyes: Conjunctivae are normal.   Neck: No JVD present. No thyromegaly present.   Cardiovascular: Normal rate and regular rhythm.   Murmur heard.   Midsystolic murmur is present with a grade of 1/6 radiating to the apex.  Pulmonary/Chest: Effort normal and breath sounds normal.   Neurological: She is alert and oriented to person, place, and time. She displays tremor.       Significant Labs:   CMP   Recent Labs   Lab 02/13/20  1459      K 3.9      CO2 26   GLU 84   BUN 19   CREATININE 1.2   CALCIUM 9.5   PROT 7.2   ALBUMIN 3.8   BILITOT 0.3   ALKPHOS 46*   AST 16   ALT 11   ANIONGAP 10   ESTGFRAFRICA 51*   EGFRNONAA 44*   , CBC   Recent Labs   Lab 02/13/20  1459   WBC 8.97   HGB 12.5   HCT 37.8       and Troponin   Recent Labs   Lab 02/13/20  1459   TROPONINI 0.304*       Significant Imaging: EKG: Sinus rhythm with the frequent PACs and nonspecific T-wave changes in the anterior leads  Assessment and Plan:  Anginal like chest pain occurring at rest with minimal elevation of troponin.  Multiple risk factors for CAD  Recommendations.    If there is further elevation of troponin suggestive of NSTEMI would recommend transfer to [facility] for angiography in a.m..  Presently she appears to be stable without any chest pain.  Obtain an echocardiogram to look for wall motion abnormality.  Agree with aspirin beta-blocker and p.r.n. Nitroglycerin.     Active Diagnoses:    Diagnosis Date Noted POA    NSTEMI (non-ST elevated myocardial infarction) [I21.4] 02/13/2020 Yes      Problems Resolved During this Admission:       VTE Risk Mitigation (From admission, onward)         Ordered     IP VTE LOW RISK PATIENT  Once      02/13/20 1339                Thank you for your consult. I will follow-up with patient. Please contact us if you have any additional  questions.    Edwin Cervantes MD  Cardiology   [facility]

## 2020-02-14 NOTE — PLAN OF CARE
Pt transferred to [facility] before  could complete discharge assessment.        02/14/20 1154   Discharge Assessment   Assessment Type Discharge Planning Assessment

## 2020-02-14 NOTE — PROGRESS NOTES
Per Dr. Shipman, may cancel this echo order. Patient being transferred to [facility].

## 2020-02-14 NOTE — PLAN OF CARE
02/14/20 1200   Final Note   Assessment Type Final Discharge Note   Anticipated Discharge Disposition Short Term

## 2020-02-14 NOTE — NURSING
Pt has new rhythm (AFIB) noted on monitor,Troponin level up from 0.304 to 0.432,no c/o of chest pain noted at this time,BRUCE Clark notified.

## 2020-02-14 NOTE — Clinical Note
130 ml injected throughout the case. 120 mL total wasted during the case. 250 mL total used in the case.

## 2020-02-15 LAB
ALBUMIN SERPL BCP-MCNC: 3.2 G/DL (ref 3.5–5.2)
ALP SERPL-CCNC: 30 U/L (ref 55–135)
ALT SERPL W/O P-5'-P-CCNC: 10 U/L (ref 10–44)
ANION GAP SERPL CALC-SCNC: 9 MMOL/L (ref 8–16)
AST SERPL-CCNC: 16 U/L (ref 10–40)
BACTERIA UR CULT: NORMAL
BASOPHILS # BLD AUTO: 0.02 K/UL (ref 0–0.2)
BASOPHILS NFR BLD: 0.2 % (ref 0–1.9)
BILIRUB SERPL-MCNC: 0.9 MG/DL (ref 0.1–1)
BUN SERPL-MCNC: 22 MG/DL (ref 8–23)
CALCIUM SERPL-MCNC: 8.6 MG/DL (ref 8.7–10.5)
CHLORIDE SERPL-SCNC: 104 MMOL/L (ref 95–110)
CO2 SERPL-SCNC: 23 MMOL/L (ref 23–29)
CREAT SERPL-MCNC: 1.2 MG/DL (ref 0.5–1.4)
DIFFERENTIAL METHOD: ABNORMAL
EOSINOPHIL # BLD AUTO: 0.2 K/UL (ref 0–0.5)
EOSINOPHIL NFR BLD: 2.1 % (ref 0–8)
ERYTHROCYTE [DISTWIDTH] IN BLOOD BY AUTOMATED COUNT: 13.7 % (ref 11.5–14.5)
EST. GFR  (AFRICAN AMERICAN): 51.1 ML/MIN/1.73 M^2
EST. GFR  (NON AFRICAN AMERICAN): 44.3 ML/MIN/1.73 M^2
GLUCOSE SERPL-MCNC: 112 MG/DL (ref 70–110)
GLUCOSE SERPL-MCNC: 127 MG/DL (ref 70–110)
GLUCOSE SERPL-MCNC: 139 MG/DL (ref 70–110)
GLUCOSE SERPL-MCNC: 180 MG/DL (ref 70–110)
HCT VFR BLD AUTO: 29.1 % (ref 37–48.5)
HGB BLD-MCNC: 9.5 G/DL (ref 12–16)
IMM GRANULOCYTES # BLD AUTO: 0.04 K/UL (ref 0–0.04)
IMM GRANULOCYTES NFR BLD AUTO: 0.4 % (ref 0–0.5)
LYMPHOCYTES # BLD AUTO: 2.4 K/UL (ref 1–4.8)
LYMPHOCYTES NFR BLD: 23.4 % (ref 18–48)
MCH RBC QN AUTO: 31.3 PG (ref 27–31)
MCHC RBC AUTO-ENTMCNC: 32.6 G/DL (ref 32–36)
MCV RBC AUTO: 96 FL (ref 82–98)
MONOCYTES # BLD AUTO: 0.9 K/UL (ref 0.3–1)
MONOCYTES NFR BLD: 8.4 % (ref 4–15)
NEUTROPHILS # BLD AUTO: 6.7 K/UL (ref 1.8–7.7)
NEUTROPHILS NFR BLD: 65.5 % (ref 38–73)
NRBC BLD-RTO: 0 /100 WBC
PLATELET # BLD AUTO: 142 K/UL (ref 150–350)
PMV BLD AUTO: 11.1 FL (ref 9.2–12.9)
POTASSIUM SERPL-SCNC: 3.8 MMOL/L (ref 3.5–5.1)
PROT SERPL-MCNC: 5.9 G/DL (ref 6–8.4)
RBC # BLD AUTO: 3.04 M/UL (ref 4–5.4)
SODIUM SERPL-SCNC: 136 MMOL/L (ref 136–145)
TROPONIN I SERPL DL<=0.01 NG/ML-MCNC: 0.58 NG/ML
WBC # BLD AUTO: 10.24 K/UL (ref 3.9–12.7)

## 2020-02-15 PROCEDURE — 36415 COLL VENOUS BLD VENIPUNCTURE: CPT

## 2020-02-15 PROCEDURE — 25000003 PHARM REV CODE 250: Performed by: INTERNAL MEDICINE

## 2020-02-15 PROCEDURE — 84484 ASSAY OF TROPONIN QUANT: CPT

## 2020-02-15 PROCEDURE — 85025 COMPLETE CBC W/AUTO DIFF WBC: CPT

## 2020-02-15 PROCEDURE — 63600175 PHARM REV CODE 636 W HCPCS: Performed by: INTERNAL MEDICINE

## 2020-02-15 PROCEDURE — 93005 ELECTROCARDIOGRAM TRACING: CPT

## 2020-02-15 PROCEDURE — 80053 COMPREHEN METABOLIC PANEL: CPT

## 2020-02-15 PROCEDURE — 94761 N-INVAS EAR/PLS OXIMETRY MLT: CPT

## 2020-02-15 PROCEDURE — G0378 HOSPITAL OBSERVATION PER HR: HCPCS

## 2020-02-15 RX ORDER — ALPRAZOLAM 0.5 MG/1
0.5 TABLET ORAL 3 TIMES DAILY PRN
Status: DISCONTINUED | OUTPATIENT
Start: 2020-02-15 | End: 2020-02-16 | Stop reason: HOSPADM

## 2020-02-15 RX ORDER — LORAZEPAM 2 MG/ML
0.25 INJECTION INTRAMUSCULAR ONCE
Status: COMPLETED | OUTPATIENT
Start: 2020-02-15 | End: 2020-02-15

## 2020-02-15 RX ORDER — MORPHINE SULFATE 2 MG/ML
2 INJECTION, SOLUTION INTRAMUSCULAR; INTRAVENOUS ONCE
Status: COMPLETED | OUTPATIENT
Start: 2020-02-15 | End: 2020-02-15

## 2020-02-15 RX ADMIN — ASPIRIN 81 MG 81 MG: 81 TABLET ORAL at 09:02

## 2020-02-15 RX ADMIN — CLOPIDOGREL BISULFATE 75 MG: 75 TABLET, FILM COATED ORAL at 09:02

## 2020-02-15 RX ADMIN — METOPROLOL TARTRATE 12.5 MG: 25 TABLET, FILM COATED ORAL at 07:02

## 2020-02-15 RX ADMIN — FLUOXETINE 40 MG: 20 CAPSULE ORAL at 07:02

## 2020-02-15 RX ADMIN — LORAZEPAM 0.25 MG: 2 INJECTION INTRAMUSCULAR; INTRAVENOUS at 10:02

## 2020-02-15 RX ADMIN — HYDROCODONE BITARTRATE AND ACETAMINOPHEN 1 TABLET: 10; 325 TABLET ORAL at 12:02

## 2020-02-15 RX ADMIN — METOPROLOL TARTRATE 12.5 MG: 25 TABLET, FILM COATED ORAL at 09:02

## 2020-02-15 RX ADMIN — ALPRAZOLAM 0.5 MG: 0.5 TABLET ORAL at 07:02

## 2020-02-15 RX ADMIN — HYDROCODONE BITARTRATE AND ACETAMINOPHEN 1 TABLET: 10; 325 TABLET ORAL at 05:02

## 2020-02-15 RX ADMIN — MORPHINE SULFATE 2 MG: 2 INJECTION, SOLUTION INTRAMUSCULAR; INTRAVENOUS at 10:02

## 2020-02-15 RX ADMIN — HYDROCODONE BITARTRATE AND ACETAMINOPHEN 1 TABLET: 5; 325 TABLET ORAL at 03:02

## 2020-02-15 RX ADMIN — HYDROCODONE BITARTRATE AND ACETAMINOPHEN 1 TABLET: 10; 325 TABLET ORAL at 07:02

## 2020-02-15 RX ADMIN — CLONAZEPAM 0.5 MG: 0.5 TABLET ORAL at 02:02

## 2020-02-15 RX ADMIN — ATORVASTATIN CALCIUM 80 MG: 40 TABLET, FILM COATED ORAL at 07:02

## 2020-02-15 RX ADMIN — SENNOSIDES AND DOCUSATE SODIUM 1 TABLET: 8.6; 5 TABLET ORAL at 09:02

## 2020-02-15 NOTE — PLAN OF CARE
Problem: Adult Inpatient Plan of Care  Goal: Plan of Care Review  Outcome: Ongoing, Progressing     Problem: Adult Inpatient Plan of Care  Goal: Optimal Comfort and Wellbeing  Outcome: Ongoing, Progressing  Intervention: Provide Person-Centered Care  Flowsheets (Taken 2/15/2020 0339)  Trust Relationship/Rapport: care explained; choices provided; questions answered; questions encouraged     Problem: Fall Injury Risk  Goal: Absence of Fall and Fall-Related Injury  Outcome: Ongoing, Progressing  Intervention: Promote Injury-Free Environment  Flowsheets (Taken 2/15/2020 0339)  Safety Promotion/Fall Prevention: assistive device/personal item within reach; bed alarm set; commode/urinal/bedpan at bedside; Fall Risk reviewed with patient/family; medications reviewed; side rails raised x 2     Problem: Pain (Acute Coronary Syndrome)  Goal: Absence of Cardiac-Related Pain  Outcome: Ongoing, Progressing  Intervention: Manage Cardiac Pain Symptoms  Flowsheets (Taken 2/15/2020 0339)  Chest Pain Intervention: cardiac monitoring continued

## 2020-02-15 NOTE — PLAN OF CARE
This note also relates to the following rows which could not be included:  SpO2 - Cannot attach notes to unvalidated device data  Pulse - Cannot attach notes to unvalidated device data  Resp - Cannot attach notes to unvalidated device data       02/15/20 0818   PRE-TX-O2   O2 Device (Oxygen Therapy) room air   Pulse Oximetry Type Continuous   $ Pulse Oximetry - Multiple Charge Pulse Oximetry - Multiple

## 2020-02-15 NOTE — SUBJECTIVE & OBJECTIVE
Past Medical History:   Diagnosis Date    Depression     Diabetes mellitus     type 2    Nervous breakdown     Renal disorder     Tachycardia        Past Surgical History:   Procedure Laterality Date    APPENDECTOMY      FOOT SURGERY      SHOULDER SURGERY Left     TUBAL LIGATION         Review of patient's allergies indicates:   Allergen Reactions    Codeine Itching, Swelling, Rash, Other (See Comments) and Nausea And Vomiting     Facial swelling, itching, rash, erythema    Pcn [penicillins] Itching, Swelling, Rash and Other (See Comments)     Facial swelling with rash, erythema, itching       Current Facility-Administered Medications on File Prior to Encounter   Medication    [DISCONTINUED] acetaminophen tablet 650 mg    [DISCONTINUED] aspirin chewable tablet 81 mg    [DISCONTINUED] atenolol split tablet 25 mg    [DISCONTINUED] atorvastatin tablet 40 mg    [DISCONTINUED] atorvastatin tablet 80 mg    [DISCONTINUED] clonazePAM tablet 0.5 mg    [DISCONTINUED] dextrose 5 % and 0.45 % NaCl infusion    [DISCONTINUED] dextrose 50% injection 12.5 g    [DISCONTINUED] dextrose 50% injection 25 g    [DISCONTINUED] enoxaparin injection 70 mg    [DISCONTINUED] FLUoxetine capsule 20 mg    [DISCONTINUED] gabapentin capsule 400 mg    [DISCONTINUED] glucagon (human recombinant) injection 1 mg    [DISCONTINUED] glucose chewable tablet 16 g    [DISCONTINUED] glucose chewable tablet 24 g    [DISCONTINUED] insulin aspart U-100 pen 0-5 Units    [DISCONTINUED] magnesium oxide tablet 800 mg    [DISCONTINUED] magnesium oxide tablet 800 mg    [DISCONTINUED] melatonin tablet 9 mg    [DISCONTINUED] metoprolol tartrate (LOPRESSOR) split tablet 12.5 mg    [DISCONTINUED] mirtazapine tablet 30 mg    [DISCONTINUED] nitroGLYCERIN 2% TD oint ointment 0.5 inch    [DISCONTINUED] nitroGLYCERIN SL tablet 0.4 mg    [DISCONTINUED] ondansetron injection 4 mg    [DISCONTINUED] pantoprazole EC tablet 40 mg     [DISCONTINUED] potassium chloride 10% oral solution 40 mEq    [DISCONTINUED] potassium chloride 10% oral solution 60 mEq    [DISCONTINUED] senna-docusate 8.6-50 mg per tablet 1 tablet    [DISCONTINUED] sodium chloride 0.9% flush 10 mL     Current Outpatient Medications on File Prior to Encounter   Medication Sig    atenoloL (TENORMIN) 25 MG tablet Take 25 mg by mouth once daily.    clonazePAM (KLONOPIN) 1 MG tablet Take 1 mg by mouth nightly as needed for Anxiety.    FLUoxetine 40 MG capsule Take 40 mg by mouth once daily.     gabapentin (NEURONTIN) 600 MG tablet Take 600 mg by mouth 3 (three) times daily.     SITagliptin (JANUVIA) 100 MG Tab Take 100 mg by mouth once daily.     Family History     None        Tobacco Use    Smoking status: Current Every Day Smoker     Packs/day: 0.50     Types: Cigarettes    Smokeless tobacco: Never Used   Substance and Sexual Activity    Alcohol use: Not Currently    Drug use: Not Currently    Sexual activity: Not on file     Review of Systems   Constitutional: Negative for activity change and appetite change.   HENT: Negative for congestion and dental problem.    Eyes: Negative for discharge and itching.   Respiratory: Negative for shortness of breath.    Cardiovascular: Negative for chest pain.   Gastrointestinal: Negative for abdominal distention and abdominal pain.   Endocrine: Negative for cold intolerance.   Genitourinary: Negative for difficulty urinating and dysuria.   Musculoskeletal: Positive for back pain. Negative for arthralgias.   Skin: Negative for color change.   Neurological: Negative for dizziness and facial asymmetry.   Hematological: Negative for adenopathy.   Psychiatric/Behavioral: Negative for agitation and behavioral problems.     Objective:     Vital Signs (Most Recent):  Temp: 98.2 °F (36.8 °C) (02/14/20 1745)  Pulse: 71 (02/14/20 1715)  Resp: 17 (02/14/20 1715)  BP: (!) 146/74 (02/14/20 1715)  SpO2: 99 % (02/14/20 1715) Vital Signs (24h  Range):  Temp:  [97.4 °F (36.3 °C)-98.7 °F (37.1 °C)] 98.2 °F (36.8 °C)  Pulse:  [61-72] 71  Resp:  [12-24] 17  SpO2:  [96 %-99 %] 99 %  BP: (109-170)/(52-74) 146/74     Weight: 70 kg (154 lb 5.2 oz)  Body mass index is 23.46 kg/m².    Physical Exam   Constitutional: She is oriented to person, place, and time. No distress.   Eyes: Pupils are equal, round, and reactive to light. EOM are normal.   Neck: Neck supple.   Cardiovascular: Normal rate.   Pulmonary/Chest: Effort normal and breath sounds normal.   Abdominal: Soft. Bowel sounds are normal.   Musculoskeletal: Normal range of motion. She exhibits no edema.   Neurological: She is alert and oriented to person, place, and time.   Skin: Skin is warm.   Psychiatric: She has a normal mood and affect.   Nursing note and vitals reviewed.        CRANIAL NERVES     CN III, IV, VI   Pupils are equal, round, and reactive to light.  Extraocular motions are normal.        Significant Labs:   CBC:   Recent Labs   Lab 02/13/20  1459 02/14/20  0510   WBC 8.97 7.76   HGB 12.5 11.2*   HCT 37.8 34.9*    185     CMP:   Recent Labs   Lab 02/13/20  1459 02/14/20  0510    143   K 3.9 3.7    108   CO2 26 25   GLU 84 114*   BUN 19 18   CREATININE 1.2 1.1   CALCIUM 9.5 9.6   PROT 7.2 6.5   ALBUMIN 3.8 3.5   BILITOT 0.3 0.2   ALKPHOS 46* 44*   AST 16 14   ALT 11 8*   ANIONGAP 10 10   EGFRNONAA 44* 49*       Significant Imaging: I have reviewed all pertinent imaging results/findings within the past 24 hours.

## 2020-02-15 NOTE — NURSING
DR. LEVINE WAS CALLED IN REF. TO RIGHT ARM AND GROIN SITES. MESSAGE WAS LEFT ON HIS CELL PHONE. DR. BONNER WAS ALSO CALLED AND ADVISED. HE STATED TO HOLD MEDS THIS MORNING ,LOPRESSOR AND PLAVIS, AND HE WOULD ORDER ULTRA SOUND OF RIGHT ARM AND GROIN.

## 2020-02-15 NOTE — NURSING
SHIFT HANDOFF. PT. HAS LARGE HEMATOMA TO LEFT ARM AT ANTICUBITAL AREA WITH SWELLING AND PAIN. SMALL BRUISING TO RIGHT WRIST AT CATH SITE. ALSO HAS HEAVY BRUISING TO RIGHT GROIN CATH SITE. PER NIGHT NURSE SYLVIA HOGUE WHO PERFORMED CATH IS AWARE. WILL CONTINUE TO MONITOR CLOSELY.

## 2020-02-15 NOTE — PLAN OF CARE
02/15/20 0903   CARMEN Message   Medicare Outpatient and Observation Notification regarding financial responsibility Given to patient/caregiver;Explained to patient/caregiver;Signed/date by patient/caregiver   Date CARMEN was signed 02/15/20   Time CARMEN was signed 0835      Discharge Planning Assessment  [facility]     Patient Name: Dara Medina  MRN: [id number]  Today's Date: 12/15/2017    Admit Date: 12/14/2017          Discharge Needs Assessment       12/15/17 1030    Living Environment    Lives With parent(s)    Living Arrangements house    Home Accessibility no concerns    Number of Stairs to Enter Home 1    Stair Railings at Home outside, present on right side    Type of Financial/Environmental Concern none    Transportation Available car;family or friend will provide    Living Environment    Quality Of Family Relationships involved;helpful;supportive    Able to Return to Prior Living Arrangements yes    Discharge Needs Assessment    Concerns To Be Addressed no discharge needs identified;denies needs/concerns at this time    Readmission Within The Last 30 Days no previous admission in last 30 days    Equipment Currently Used at Home none    Discharge Facility/Level Of Care Needs home with home health            Discharge Plan       12/15/17 1031    Case Management/Social Work Plan    Plan Home w/ family and [facility].     Additional Comments Met w/ pt and family at the bedside verified facesheet and d/c ploan. Pt plans to d/c home w/ the help of family, she would like [facility]. [facility] notified and will accept.         Discharge Placement     Facility/Agency Request Status Selected? Address Phone Number Fax Number    [facility] Accepted    Yes [address] [address] [phone]                Demographic Summary     None            Functional Status       12/15/17 1031    Functional Status Current    Ambulation 3-->assistive equipment and person    Transferring 3-->assistive equipment and person    Toileting 3-->assistive equipment and person    Bathing 2-->assistive person    Dressing 2-->assistive person    Eating 0-->independent    Communication 0-->understands/communicates without difficulty    Swallowing (if score  2 or more for any item, consult Rehab Services) 0-->swallows foods/liquids without difficulty    Change in Functional Status Since Onset of Current Illness/Injury yes    Functional Status Prior    Ambulation 0-->independent    Transferring 0-->independent    Toileting 0-->independent    Bathing 0-->independent    Dressing 0-->independent    Eating 0-->independent    Communication 0-->understands/communicates without difficulty    Swallowing 0-->swallows foods/liquids without difficulty    IADL    Medications independent    Meal Preparation independent    Housekeeping independent    Laundry independent    Shopping independent    Oral Care independent    Activity Tolerance    Usual Activity Tolerance good    Current Activity Tolerance fair    Cognitive/Perceptual/Developmental    Current Mental Status/Cognitive Functioning no deficits noted    Recent Changes in Mental Status/Cognitive Functioning no changes            Psychosocial     None            Abuse/Neglect     None            Legal     None            Substance Abuse     None            Patient Forms     None          Kinga Jacques RN

## 2020-02-15 NOTE — PROGRESS NOTES
Called in by the nurse to evaluate the hematoma.  Right groin:  She has ecchymoses.  It is soft.  Right elbow:  Hematoma appreciated.  Does not seem to have expanded beyond the marked site.  She has good radial pulse.  Ultrasound of the right groin and right arm has been ordered and is currently being done.  No evidence of any pseudoaneurysm.  Patient will likely have the pain from the hematoma.  Will try morphine 2 mg for now and reassess the situation.

## 2020-02-15 NOTE — PROGRESS NOTES
[facility]  Cardiology  Progress Note    Patient Name: Queta Ocampo  MRN: [id number]  Admission Date: 2/14/2020  Hospital Length of Stay: 0 days  Code Status: Full Code   Attending Physician: Ervin Wiley MD   Primary Care Physician: Jean Alexander MD  Expected Discharge Date:   Principal Problem:NSTEMI (non-ST elevated myocardial infarction)    Subjective:     Hospital Course: s/p stent to RCA for >90% lesion proximal RCA. Due to development of severe spasm in R radial Intervention paert of the procedure done via R Femoral. Noted to have R lower arm hematoma.    Interval History:  Reports pain in the right arm hematoma area.  She also has mild pain in the right groin.  She has not had any further angina.  ROS  Objective:     Vital Signs (Most Recent):  Temp: 99 °F (37.2 °C) (02/15/20 0750)  Pulse: 76 (02/15/20 0818)  Resp: 20 (02/15/20 0818)  BP: (!) 105/48 (02/15/20 0750)  SpO2: 96 % (02/15/20 0818) Vital Signs (24h Range):  Temp:  [97.5 °F (36.4 °C)-99 °F (37.2 °C)] 99 °F (37.2 °C)  Pulse:  [59-76] 76  Resp:  [10-24] 20  SpO2:  [95 %-100 %] 96 %  BP: (105-150)/(48-84) 105/48     Weight: 69.5 kg (153 lb 3.5 oz)  Body mass index is 23.3 kg/m².    SpO2: 96 %  O2 Device (Oxygen Therapy): room air      Intake/Output Summary (Last 24 hours) at 2/15/2020 1120  Last data filed at 2/15/2020 0600  Gross per 24 hour   Intake 963.75 ml   Output 200 ml   Net 763.75 ml       Lines/Drains/Airways     Peripheral Intravenous Line                 Peripheral IV - Single Lumen 02/13/20 1530 20 G Left Forearm 1 day         Peripheral IV - Single Lumen 02/14/20 1245 20 G Left Forearm less than 1 day                Physical Exam alert and well oriented.  Vital signs are stable.  Right radial pulse is intact has small orange sized hematoma right medial aspect of arm.  The right groin hematoma is soft and nontender is lot of bruising  Heart sounds are normal    Significant Labs:   BMP:   Recent Labs   Lab 02/13/20  3735  02/14/20  0510 02/15/20  0406   GLU 84 114* 112*    143 136   K 3.9 3.7 3.8    108 104   CO2 26 25 23   BUN 19 18 22   CREATININE 1.2 1.1 1.2   CALCIUM 9.5 9.6 8.6*   MG  --  1.8  --    , CBC   Recent Labs   Lab 02/13/20  1459 02/14/20  0510 02/15/20  0406   WBC 8.97 7.76 10.24   HGB 12.5 11.2* 9.5*   HCT 37.8 34.9* 29.1*    185 142*    and Troponin   Recent Labs   Lab 02/14/20  0110 02/14/20  0510 02/15/20  0406   TROPONINI 0.953* 1.014* 0.579*       Significant Imaging: EKG: EKG this morning shows some sinus rhythm no new Q-waves and nonspecific ST changes.  Ultrasound studies of both upper and lower extremity arteries do not show any pseudoaneurysm and normal flow in radial artery  Assessment and Plan:  NSTEMI.  Status post stent to RCA troponin down what stent trend.  Stable right upper arm and groin hematomas with drop in hemoglobin to 9.5  Plan  Can sit up and ambulate in the room  Continue present medical regimen observe in the hospital 1 more day repeat hemoglobin tomorrow         Brief HPI:     Active Diagnoses:    Diagnosis Date Noted POA    PRINCIPAL PROBLEM:  NSTEMI (non-ST elevated myocardial infarction) [I21.4] 02/13/2020 Yes    Diabetes mellitus [E11.9] 02/14/2020 Unknown    HTN (hypertension) [I10] 02/14/2020 Unknown    Depression [F32.9] 04/09/2019 Yes      Problems Resolved During this Admission:       VTE Risk Mitigation (From admission, onward)         Ordered     IP VTE LOW RISK PATIENT  Once      02/14/20 1223                Edwin Cervantes MD  Cardiology  Critical access hospital

## 2020-02-15 NOTE — HPI
75-year-old lady got transferred from [facility] for coronary angiogram  She initially presented to the hospital with a history of on and off chest pain for the past few weeks  Patient recently lost her sister  Per her, the pain was like a pressure-like symptom in the chest with no aggravating and relieving factors  She has tried to treat the pain with nitroglycerin tablets with no effect  In the hospital her troponin levels were found to be on higher range with ongoing chest pressure and eventually she was transferred to this hospital for coronary angiogram  Today she underwent coronary angiogram and a stent was inserted  Postprocedure patient doing fine.  She denies any other complaint except back pain

## 2020-02-15 NOTE — H&P
[facility] Medicine  History & Physical    Patient Name: Queta Ocampo  MRN: [id number]  Admission Date: 2/14/2020  Attending Physician: Ervin Wiley MD   Primary Care Provider: Jean Alexander MD         Patient information was obtained from patient and ER records.     Subjective:     Principal Problem:NSTEMI (non-ST elevated myocardial infarction)    Chief Complaint: No chief complaint on file.       HPI: 75-year-old lady got transferred from [facility] for coronary angiogram  She initially presented to the hospital with a history of on and off chest pain for the past few weeks  Patient recently lost her sister  Per her, the pain was like a pressure-like symptom in the chest with no aggravating and relieving factors  She has tried to treat the pain with nitroglycerin tablets with no effect  In the hospital her troponin levels were found to be on higher range with ongoing chest pressure and eventually she was transferred to this hospital for coronary angiogram  Today she underwent coronary angiogram and a stent was inserted  Postprocedure patient doing fine.  She denies any other complaint except back pain    Past Medical History:   Diagnosis Date    Depression     Diabetes mellitus     type 2    Nervous breakdown     Renal disorder     Tachycardia        Past Surgical History:   Procedure Laterality Date    APPENDECTOMY      FOOT SURGERY      SHOULDER SURGERY Left     TUBAL LIGATION         Review of patient's allergies indicates:   Allergen Reactions    Codeine Itching, Swelling, Rash, Other (See Comments) and Nausea And Vomiting     Facial swelling, itching, rash, erythema    Pcn [penicillins] Itching, Swelling, Rash and Other (See Comments)     Facial swelling with rash, erythema, itching       Current Facility-Administered Medications on File Prior to Encounter   Medication    [DISCONTINUED] acetaminophen tablet 650 mg    [DISCONTINUED] aspirin chewable tablet 81 mg     [DISCONTINUED] atenolol split tablet 25 mg    [DISCONTINUED] atorvastatin tablet 40 mg    [DISCONTINUED] atorvastatin tablet 80 mg    [DISCONTINUED] clonazePAM tablet 0.5 mg    [DISCONTINUED] dextrose 5 % and 0.45 % NaCl infusion    [DISCONTINUED] dextrose 50% injection 12.5 g    [DISCONTINUED] dextrose 50% injection 25 g    [DISCONTINUED] enoxaparin injection 70 mg    [DISCONTINUED] FLUoxetine capsule 20 mg    [DISCONTINUED] gabapentin capsule 400 mg    [DISCONTINUED] glucagon (human recombinant) injection 1 mg    [DISCONTINUED] glucose chewable tablet 16 g    [DISCONTINUED] glucose chewable tablet 24 g    [DISCONTINUED] insulin aspart U-100 pen 0-5 Units    [DISCONTINUED] magnesium oxide tablet 800 mg    [DISCONTINUED] magnesium oxide tablet 800 mg    [DISCONTINUED] melatonin tablet 9 mg    [DISCONTINUED] metoprolol tartrate (LOPRESSOR) split tablet 12.5 mg    [DISCONTINUED] mirtazapine tablet 30 mg    [DISCONTINUED] nitroGLYCERIN 2% TD oint ointment 0.5 inch    [DISCONTINUED] nitroGLYCERIN SL tablet 0.4 mg    [DISCONTINUED] ondansetron injection 4 mg    [DISCONTINUED] pantoprazole EC tablet 40 mg    [DISCONTINUED] potassium chloride 10% oral solution 40 mEq    [DISCONTINUED] potassium chloride 10% oral solution 60 mEq    [DISCONTINUED] senna-docusate 8.6-50 mg per tablet 1 tablet    [DISCONTINUED] sodium chloride 0.9% flush 10 mL     Current Outpatient Medications on File Prior to Encounter   Medication Sig    atenoloL (TENORMIN) 25 MG tablet Take 25 mg by mouth once daily.    clonazePAM (KLONOPIN) 1 MG tablet Take 1 mg by mouth nightly as needed for Anxiety.    FLUoxetine 40 MG capsule Take 40 mg by mouth once daily.     gabapentin (NEURONTIN) 600 MG tablet Take 600 mg by mouth 3 (three) times daily.     SITagliptin (JANUVIA) 100 MG Tab Take 100 mg by mouth once daily.     Family History     None        Tobacco Use    Smoking status: Current Every Day Smoker     Packs/day: 0.50      Types: Cigarettes    Smokeless tobacco: Never Used   Substance and Sexual Activity    Alcohol use: Not Currently    Drug use: Not Currently    Sexual activity: Not on file     Review of Systems   Constitutional: Negative for activity change and appetite change.   HENT: Negative for congestion and dental problem.    Eyes: Negative for discharge and itching.   Respiratory: Negative for shortness of breath.    Cardiovascular: Negative for chest pain.   Gastrointestinal: Negative for abdominal distention and abdominal pain.   Endocrine: Negative for cold intolerance.   Genitourinary: Negative for difficulty urinating and dysuria.   Musculoskeletal: Positive for back pain. Negative for arthralgias.   Skin: Negative for color change.   Neurological: Negative for dizziness and facial asymmetry.   Hematological: Negative for adenopathy.   Psychiatric/Behavioral: Negative for agitation and behavioral problems.     Objective:     Vital Signs (Most Recent):  Temp: 98.2 °F (36.8 °C) (02/14/20 1745)  Pulse: 71 (02/14/20 1715)  Resp: 17 (02/14/20 1715)  BP: (!) 146/74 (02/14/20 1715)  SpO2: 99 % (02/14/20 1715) Vital Signs (24h Range):  Temp:  [97.4 °F (36.3 °C)-98.7 °F (37.1 °C)] 98.2 °F (36.8 °C)  Pulse:  [61-72] 71  Resp:  [12-24] 17  SpO2:  [96 %-99 %] 99 %  BP: (109-170)/(52-74) 146/74     Weight: 70 kg (154 lb 5.2 oz)  Body mass index is 23.46 kg/m².    Physical Exam   Constitutional: She is oriented to person, place, and time. No distress.   Eyes: Pupils are equal, round, and reactive to light. EOM are normal.   Neck: Neck supple.   Cardiovascular: Normal rate.   Pulmonary/Chest: Effort normal and breath sounds normal.   Abdominal: Soft. Bowel sounds are normal.   Musculoskeletal: Normal range of motion. She exhibits no edema.   Neurological: She is alert and oriented to person, place, and time.   Skin: Skin is warm.   Psychiatric: She has a normal mood and affect.   Nursing note and vitals reviewed.        CRANIAL  NERVES     CN III, IV, VI   Pupils are equal, round, and reactive to light.  Extraocular motions are normal.        Significant Labs:   CBC:   Recent Labs   Lab 02/13/20  1459 02/14/20  0510   WBC 8.97 7.76   HGB 12.5 11.2*   HCT 37.8 34.9*    185     CMP:   Recent Labs   Lab 02/13/20  1459 02/14/20  0510    143   K 3.9 3.7    108   CO2 26 25   GLU 84 114*   BUN 19 18   CREATININE 1.2 1.1   CALCIUM 9.5 9.6   PROT 7.2 6.5   ALBUMIN 3.8 3.5   BILITOT 0.3 0.2   ALKPHOS 46* 44*   AST 16 14   ALT 11 8*   ANIONGAP 10 10   EGFRNONAA 44* 49*       Significant Imaging: I have reviewed all pertinent imaging results/findings within the past 24 hours.    Assessment/Plan:     * NSTEMI (non-ST elevated myocardial infarction)  Patient getting admitted with the NSTEMI  Patient had a coronary angiogram and a stent was inserted  Continue aspirin/beta blocker/Plavix/statin  If everything is fine patient can go home tomorrow      HTN (hypertension)  Stable chronic issue      Diabetes mellitus  Will start patient on sliding scale insulin      Depression  Chronic stable issue  Continue fluoxetine      VTE Risk Mitigation (From admission, onward)         Ordered     IP VTE LOW RISK PATIENT  Once      02/14/20 1223                   Ervin Wiley MD  Department of Hospital Medicine   [facility]

## 2020-02-15 NOTE — ASSESSMENT & PLAN NOTE
Patient getting admitted with the NSTEMI  Patient had a coronary angiogram and a stent was inserted  Continue aspirin/beta blocker/Plavix/statin  If everything is fine patient can go home tomorrow

## 2020-02-16 VITALS
BODY MASS INDEX: 22.95 KG/M2 | WEIGHT: 151.44 LBS | OXYGEN SATURATION: 96 % | RESPIRATION RATE: 29 BRPM | TEMPERATURE: 98 F | HEIGHT: 68 IN | SYSTOLIC BLOOD PRESSURE: 130 MMHG | HEART RATE: 79 BPM | DIASTOLIC BLOOD PRESSURE: 57 MMHG

## 2020-02-16 PROBLEM — I21.4 NSTEMI (NON-ST ELEVATED MYOCARDIAL INFARCTION): Status: RESOLVED | Noted: 2020-02-13 | Resolved: 2020-02-16

## 2020-02-16 LAB
ALBUMIN SERPL BCP-MCNC: 3.7 G/DL (ref 3.5–5.2)
ALP SERPL-CCNC: 34 U/L (ref 55–135)
ALT SERPL W/O P-5'-P-CCNC: 11 U/L (ref 10–44)
ANION GAP SERPL CALC-SCNC: 11 MMOL/L (ref 8–16)
AST SERPL-CCNC: 21 U/L (ref 10–40)
BASOPHILS # BLD AUTO: 0.03 K/UL (ref 0–0.2)
BASOPHILS NFR BLD: 0.2 % (ref 0–1.9)
BILIRUB SERPL-MCNC: 0.8 MG/DL (ref 0.1–1)
BUN SERPL-MCNC: 27 MG/DL (ref 8–23)
CALCIUM SERPL-MCNC: 9.2 MG/DL (ref 8.7–10.5)
CHLORIDE SERPL-SCNC: 104 MMOL/L (ref 95–110)
CO2 SERPL-SCNC: 24 MMOL/L (ref 23–29)
CREAT SERPL-MCNC: 1.1 MG/DL (ref 0.5–1.4)
DIFFERENTIAL METHOD: ABNORMAL
EOSINOPHIL # BLD AUTO: 0.5 K/UL (ref 0–0.5)
EOSINOPHIL NFR BLD: 4.2 % (ref 0–8)
ERYTHROCYTE [DISTWIDTH] IN BLOOD BY AUTOMATED COUNT: 13.4 % (ref 11.5–14.5)
EST. GFR  (AFRICAN AMERICAN): 56.8 ML/MIN/1.73 M^2
EST. GFR  (NON AFRICAN AMERICAN): 49.2 ML/MIN/1.73 M^2
GLUCOSE SERPL-MCNC: 125 MG/DL (ref 70–110)
GLUCOSE SERPL-MCNC: 125 MG/DL (ref 70–110)
HCT VFR BLD AUTO: 27.9 % (ref 37–48.5)
HGB BLD-MCNC: 9.3 G/DL (ref 12–16)
IMM GRANULOCYTES # BLD AUTO: 0.05 K/UL (ref 0–0.04)
IMM GRANULOCYTES NFR BLD AUTO: 0.4 % (ref 0–0.5)
LYMPHOCYTES # BLD AUTO: 3.3 K/UL (ref 1–4.8)
LYMPHOCYTES NFR BLD: 26.9 % (ref 18–48)
MCH RBC QN AUTO: 31.5 PG (ref 27–31)
MCHC RBC AUTO-ENTMCNC: 33.3 G/DL (ref 32–36)
MCV RBC AUTO: 95 FL (ref 82–98)
MONOCYTES # BLD AUTO: 0.9 K/UL (ref 0.3–1)
MONOCYTES NFR BLD: 7.4 % (ref 4–15)
NEUTROPHILS # BLD AUTO: 7.5 K/UL (ref 1.8–7.7)
NEUTROPHILS NFR BLD: 60.9 % (ref 38–73)
NRBC BLD-RTO: 0 /100 WBC
PLATELET # BLD AUTO: 144 K/UL (ref 150–350)
PMV BLD AUTO: 11.1 FL (ref 9.2–12.9)
POTASSIUM SERPL-SCNC: 3.7 MMOL/L (ref 3.5–5.1)
PROT SERPL-MCNC: 6.7 G/DL (ref 6–8.4)
RBC # BLD AUTO: 2.95 M/UL (ref 4–5.4)
SODIUM SERPL-SCNC: 139 MMOL/L (ref 136–145)
WBC # BLD AUTO: 12.25 K/UL (ref 3.9–12.7)

## 2020-02-16 PROCEDURE — 36415 COLL VENOUS BLD VENIPUNCTURE: CPT

## 2020-02-16 PROCEDURE — 97161 PT EVAL LOW COMPLEX 20 MIN: CPT

## 2020-02-16 PROCEDURE — 25000003 PHARM REV CODE 250: Performed by: INTERNAL MEDICINE

## 2020-02-16 PROCEDURE — 94761 N-INVAS EAR/PLS OXIMETRY MLT: CPT

## 2020-02-16 PROCEDURE — 85025 COMPLETE CBC W/AUTO DIFF WBC: CPT

## 2020-02-16 PROCEDURE — 21000000 HC CCU ICU ROOM CHARGE

## 2020-02-16 PROCEDURE — 80053 COMPREHEN METABOLIC PANEL: CPT

## 2020-02-16 PROCEDURE — 97535 SELF CARE MNGMENT TRAINING: CPT

## 2020-02-16 PROCEDURE — 97166 OT EVAL MOD COMPLEX 45 MIN: CPT

## 2020-02-16 RX ORDER — HYDROCODONE BITARTRATE AND ACETAMINOPHEN 5; 325 MG/1; MG/1
1 TABLET ORAL EVERY 8 HOURS PRN
Qty: 15 TABLET | Refills: 0 | Status: SHIPPED | OUTPATIENT
Start: 2020-02-16 | End: 2020-02-20 | Stop reason: ALTCHOICE

## 2020-02-16 RX ORDER — ATORVASTATIN CALCIUM 80 MG/1
80 TABLET, FILM COATED ORAL NIGHTLY
Qty: 30 TABLET | Refills: 0 | Status: ON HOLD | OUTPATIENT
Start: 2020-02-16 | End: 2020-06-17 | Stop reason: HOSPADM

## 2020-02-16 RX ORDER — ALPRAZOLAM 0.25 MG/1
0.25 TABLET ORAL 3 TIMES DAILY
Qty: 15 TABLET | Refills: 0 | Status: SHIPPED | OUTPATIENT
Start: 2020-02-16 | End: 2020-02-20

## 2020-02-16 RX ORDER — METOPROLOL TARTRATE 25 MG/1
12.5 TABLET, FILM COATED ORAL 2 TIMES DAILY
Qty: 30 TABLET | Refills: 0 | Status: ON HOLD | OUTPATIENT
Start: 2020-02-16 | End: 2020-03-17

## 2020-02-16 RX ORDER — CLOPIDOGREL BISULFATE 75 MG/1
75 TABLET ORAL DAILY
Qty: 30 TABLET | Refills: 0 | Status: ON HOLD | OUTPATIENT
Start: 2020-02-17 | End: 2020-06-17 | Stop reason: HOSPADM

## 2020-02-16 RX ORDER — NAPROXEN SODIUM 220 MG/1
81 TABLET, FILM COATED ORAL DAILY
Qty: 30 TABLET | Refills: 0 | Status: ON HOLD | OUTPATIENT
Start: 2020-02-17 | End: 2020-03-18

## 2020-02-16 RX ORDER — GABAPENTIN 600 MG/1
300 TABLET ORAL 3 TIMES DAILY
Start: 2020-02-16 | End: 2020-02-20 | Stop reason: DRUGHIGH

## 2020-02-16 RX ADMIN — CLOPIDOGREL BISULFATE 75 MG: 75 TABLET, FILM COATED ORAL at 08:02

## 2020-02-16 RX ADMIN — SENNOSIDES AND DOCUSATE SODIUM 1 TABLET: 8.6; 5 TABLET ORAL at 08:02

## 2020-02-16 RX ADMIN — METOPROLOL TARTRATE 12.5 MG: 25 TABLET, FILM COATED ORAL at 08:02

## 2020-02-16 RX ADMIN — ASPIRIN 81 MG 81 MG: 81 TABLET ORAL at 08:02

## 2020-02-16 RX ADMIN — PANTOPRAZOLE SODIUM 40 MG: 40 TABLET, DELAYED RELEASE ORAL at 05:02

## 2020-02-16 RX ADMIN — HYDROCODONE BITARTRATE AND ACETAMINOPHEN 1 TABLET: 10; 325 TABLET ORAL at 12:02

## 2020-02-16 RX ADMIN — HYDROCODONE BITARTRATE AND ACETAMINOPHEN 1 TABLET: 10; 325 TABLET ORAL at 10:02

## 2020-02-16 RX ADMIN — HYDROCODONE BITARTRATE AND ACETAMINOPHEN 1 TABLET: 10; 325 TABLET ORAL at 04:02

## 2020-02-16 RX ADMIN — POTASSIUM CHLORIDE 40 MEQ: 20 SOLUTION ORAL at 06:02

## 2020-02-16 RX ADMIN — ALPRAZOLAM 0.5 MG: 0.5 TABLET ORAL at 06:02

## 2020-02-16 RX ADMIN — HYDROCODONE BITARTRATE AND ACETAMINOPHEN 1 TABLET: 10; 325 TABLET ORAL at 03:02

## 2020-02-16 RX ADMIN — FLUOXETINE 40 MG: 20 CAPSULE ORAL at 08:02

## 2020-02-16 NOTE — SUBJECTIVE & OBJECTIVE
Interval History:     Review of Systems   Constitutional: Negative for activity change and appetite change.   HENT: Negative for congestion and dental problem.    Eyes: Negative for discharge and itching.   Respiratory: Negative for shortness of breath.    Cardiovascular: Negative for chest pain.   Gastrointestinal: Negative for abdominal distention and abdominal pain.   Endocrine: Negative for cold intolerance.   Genitourinary: Negative for difficulty urinating and dysuria.   Musculoskeletal: Negative for arthralgias and back pain.   Skin: Negative for color change.   Neurological: Negative for dizziness and facial asymmetry.   Hematological: Negative for adenopathy.   Psychiatric/Behavioral: Negative for agitation and behavioral problems.     Objective:     Vital Signs (Most Recent):  Temp: 98.7 °F (37.1 °C) (02/15/20 1558)  Pulse: 69 (02/15/20 1300)  Resp: 18 (02/15/20 1300)  BP: (!) 124/59 (02/15/20 1558)  SpO2: 99 % (02/15/20 1300) Vital Signs (24h Range):  Temp:  [97.5 °F (36.4 °C)-99 °F (37.2 °C)] 98.7 °F (37.1 °C)  Pulse:  [59-76] 69  Resp:  [10-32] 18  SpO2:  [87 %-100 %] 99 %  BP: (105-141)/(48-84) 124/59     Weight: 69.5 kg (153 lb 3.5 oz)  Body mass index is 23.3 kg/m².    Intake/Output Summary (Last 24 hours) at 2/15/2020 1803  Last data filed at 2/15/2020 0600  Gross per 24 hour   Intake 963.75 ml   Output 200 ml   Net 763.75 ml      Physical Exam   Constitutional: She is oriented to person, place, and time. No distress.   HENT:   Right Ear: External ear normal.   Left Ear: External ear normal.   Eyes: Pupils are equal, round, and reactive to light. EOM are normal.   Neck: Neck supple.   Cardiovascular: Normal rate.   Pulmonary/Chest: Effort normal and breath sounds normal.   Abdominal: Soft. Bowel sounds are normal.   Musculoskeletal: Normal range of motion. She exhibits no edema.   Neurological: She is alert and oriented to person, place, and time.   Skin: Skin is warm.   Bruise/hematoma on RUE/R  Thigh area   Psychiatric: She has a normal mood and affect.   Nursing note and vitals reviewed.      Significant Labs:   CBC:   Recent Labs   Lab 02/14/20  0510 02/15/20  0406   WBC 7.76 10.24   HGB 11.2* 9.5*   HCT 34.9* 29.1*    142*     CMP:   Recent Labs   Lab 02/14/20  0510 02/15/20  0406    136   K 3.7 3.8    104   CO2 25 23   * 112*   BUN 18 22   CREATININE 1.1 1.2   CALCIUM 9.6 8.6*   PROT 6.5 5.9*   ALBUMIN 3.5 3.2*   BILITOT 0.2 0.9   ALKPHOS 44* 30*   AST 14 16   ALT 8* 10   ANIONGAP 10 9   EGFRNONAA 49* 44.3*       Significant Imaging: I have reviewed all pertinent imaging results/findings within the past 24 hours.

## 2020-02-16 NOTE — PLAN OF CARE
02/16/20 1218   Discharge Reassessment   Assessment Type Final Discharge Note   Anticipated Discharge Disposition Home-Health   Discharge Plan A Home Health;Home   Patient choice form signed by patient/caregiver List with quality metrics by geographic area provided;List from CMS Compare  (SW consulted for Home Health. AYE met with pt and family at bedside to discuss and provide list of HH agencies. Pt chose [facility]. Referral and d/c orders sent. Pt choice signed at 1211 on this date. )   Post-Acute Status   Post-Acute Authorization Home Health/Hospice   Home Health/Hospice Status Referrals Sent

## 2020-02-16 NOTE — NURSING
PT. FAMILY ADVISED PT. IS HAVING PSYCHE. ISSUES THIS MORNING NOT WANTING TO EAT AND MAD AT EVERYONE. PT. DID HAVE ANXIETY ATTACK THIS MORNING PRIOR TO SHIFT CHANGE PER [facility] NURSE. HE DID GIVE HER XANAX 0.25 PO AS ORDERED HOWEVER THIS DID NOT APPEAR TO HELP WITH HER ANXIETY. PT. IS CONCERNED ABOUT HER GOING HOME IN THIS CONDITION DUE TO FACT THEY LIVE FAR AWAY AND PT. WILL BE BY HERSELF. I CALLED DR. BONNER AND ADVISED HIM OF THIS. HE STATED HE WOULD SPEAK WITH DR. HOGUE AND THEN COME SEE PT. AND SPEAK WITH FAMILY.

## 2020-02-16 NOTE — PROGRESS NOTES
[facility]  Cardiology  Progress Note    Patient Name: Queta Ocampo  MRN: [id number]  Admission Date: 2/14/2020  Hospital Length of Stay: 0 days  Code Status: Full Code   Attending Physician: Ervin Wiley MD   Primary Care Physician: Jean Alexander MD  Expected Discharge Date: 2/16/2020  Principal Problem:NSTEMI (non-ST elevated myocardial infarction)    Subjective:     Hospital Course:s/p RCA stent  Interval History:R forearm painful, Rt groin ok. Issues with depression noted.  ROS  Objective:     Vital Signs (Most Recent):  Temp: 97.3 °F (36.3 °C) (02/16/20 1100)  Pulse: 71 (02/16/20 1100)  Resp: (!) 25 (02/16/20 1100)  BP: (!) 145/66 (02/16/20 1100)  SpO2: 96 % (02/16/20 1100) Vital Signs (24h Range):  Temp:  [97.3 °F (36.3 °C)-98.7 °F (37.1 °C)] 97.3 °F (36.3 °C)  Pulse:  [61-76] 71  Resp:  [16-27] 25  SpO2:  [95 %-100 %] 96 %  BP: (119-145)/(56-74) 145/66     Weight: 68.7 kg (151 lb 7.3 oz)  Body mass index is 23.03 kg/m².    SpO2: 96 %  O2 Device (Oxygen Therapy): room air      Intake/Output Summary (Last 24 hours) at 2/16/2020 1201  Last data filed at 2/16/2020 0600  Gross per 24 hour   Intake 240 ml   Output 475 ml   Net -235 ml       Lines/Drains/Airways     Peripheral Intravenous Line                 Peripheral IV - Single Lumen 02/13/20 1530 20 G Left Forearm 2 days         Peripheral IV - Single Lumen 02/14/20 1245 20 G Left Forearm 1 day                Physical ExamHematoma in R medial arm is softer.R radial pulse intact.  Heart sounds are normal lungs are clear right groin lot of bruising and minimal tenderness    Significant Labs:   BMP:   Recent Labs   Lab 02/15/20  0406 02/16/20  0428   * 125*    139   K 3.8 3.7    104   CO2 23 24   BUN 22 27*   CREATININE 1.2 1.1   CALCIUM 8.6* 9.2    and CBC   Recent Labs   Lab 02/15/20  0406 02/16/20  0428   WBC 10.24 12.25   HGB 9.5* 9.3*   HCT 29.1* 27.9*   * 144*         Significant Imaging:   Assessment and  Plan:  NSTEMI  High-grade right coronary lesion treated with a drug-eluting stent.  Procedure complicated by the right arm hematoma and a small hematoma in the right groin.  Hemoglobin is stable  No further angina  Okay to go home  Medications discussed  Follow-up in 1 week     Brief HPI:     Active Diagnoses:    Diagnosis Date Noted POA    Diabetes mellitus [E11.9] 02/14/2020 Unknown    HTN (hypertension) [I10] 02/14/2020 Unknown    Depression [F32.9] 04/09/2019 Yes      Problems Resolved During this Admission:    Diagnosis Date Noted Date Resolved POA    PRINCIPAL PROBLEM:  NSTEMI (non-ST elevated myocardial infarction) [I21.4] 02/13/2020 02/16/2020 Yes       VTE Risk Mitigation (From admission, onward)         Ordered     IP VTE LOW RISK PATIENT  Once      02/14/20 1223                Edwin Cervantes MD  Cardiology  [facility]

## 2020-02-16 NOTE — ASSESSMENT & PLAN NOTE
Patient  admitted with the NSTEMI  Patient had a coronary angiogram and a stent was inserted in RCA  Continue aspirin/beta blocker/Plavix/statin  If everything is fine patient can go home tomorrow

## 2020-02-16 NOTE — PLAN OF CARE
Problem: Physical Therapy Goal  Goal: Physical Therapy Goal  Description  Goals to be met by: 2020    Patient will increase functional independence with mobility by performin. Supine to sit with Modified [place]  2. Sit to supine with Modified [place]  3. Sit to stand transfer with Modified [place]  4. Bed to chair transfer with Modified [place] using Rolling Walker  5. Gait  x 150 feet with Supervision using Rolling Walker.      Outcome: Ongoing, Progressing

## 2020-02-16 NOTE — PLAN OF CARE
This note also relates to the following rows which could not be included:  SpO2 - Cannot attach notes to unvalidated device data  Pulse - Cannot attach notes to unvalidated device data  Resp - Cannot attach notes to unvalidated device data       02/16/20 0840   PRE-TX-O2   O2 Device (Oxygen Therapy) room air   Pulse Oximetry Type Continuous   $ Pulse Oximetry - Multiple Charge Pulse Oximetry - Multiple

## 2020-02-16 NOTE — PLAN OF CARE
Problem: Adult Inpatient Plan of Care  Goal: Plan of Care Review  Outcome: Ongoing, Progressing     Problem: Adult Inpatient Plan of Care  Goal: Optimal Comfort and Wellbeing  Outcome: Ongoing, Progressing  Intervention: Provide Person-Centered Care  Flowsheets (Taken 2/16/2020 0250)  Trust Relationship/Rapport: care explained; choices provided; questions answered; questions encouraged     Problem: Fall Injury Risk  Goal: Absence of Fall and Fall-Related Injury  Outcome: Ongoing, Progressing  Intervention: Promote Injury-Free Environment  Flowsheets (Taken 2/16/2020 0250)  Safety Promotion/Fall Prevention: assistive device/personal item within reach; bed alarm set; commode/urinal/bedpan at bedside; Fall Risk reviewed with patient/family; medications reviewed; side rails raised x 2     Problem: Pain (Acute Coronary Syndrome)  Goal: Absence of Cardiac-Related Pain  Intervention: Manage Cardiac Pain Symptoms  Flowsheets (Taken 2/16/2020 0250)  Chest Pain Intervention: cardiac monitoring continued

## 2020-02-16 NOTE — HOSPITAL COURSE
Patient admitted with NSTEMI and had stent in mid RCA  Postoperatively pt did fine except for hematoma . There was no pseudoaneurysm  Patient very depressed and was very anxious  She rejected SNF placement for rehab  She opted to go home with  PT/OT  Plan discussed in detail with pt and Pts Grand daughter  Later she was D/C to home

## 2020-02-16 NOTE — PROGRESS NOTES
[facility] Medicine  Progress Note    Patient Name: Qutea Ocampo  MRN: [id number]  Patient Class: OP- Observation   Admission Date: 2/14/2020  Length of Stay: 0 days  Attending Physician: Ervin Wiley MD  Primary Care Provider: Jean Alexander MD        Subjective:     Principal Problem:NSTEMI (non-ST elevated myocardial infarction)        HPI:  75-year-old lady got transferred from [facility] for coronary angiogram  She initially presented to the hospital with a history of on and off chest pain for the past few weeks  Patient recently lost her sister  Per her, the pain was like a pressure-like symptom in the chest with no aggravating and relieving factors  She has tried to treat the pain with nitroglycerin tablets with no effect  In the hospital her troponin levels were found to be on higher range with ongoing chest pressure and eventually she was transferred to this hospital for coronary angiogram  Today she underwent coronary angiogram and a stent was inserted  Postprocedure patient doing fine.  She denies any other complaint except back pain    Overview/Hospital Course:  Pt very anxious  Bruise / Hematoma on RUE and R thigh area  USS arteries r/o Pseudoaneurysm     Interval History:     Review of Systems   Constitutional: Negative for activity change and appetite change.   HENT: Negative for congestion and dental problem.    Eyes: Negative for discharge and itching.   Respiratory: Negative for shortness of breath.    Cardiovascular: Negative for chest pain.   Gastrointestinal: Negative for abdominal distention and abdominal pain.   Endocrine: Negative for cold intolerance.   Genitourinary: Negative for difficulty urinating and dysuria.   Musculoskeletal: Negative for arthralgias and back pain.   Skin: Negative for color change.   Neurological: Negative for dizziness and facial asymmetry.   Hematological: Negative for adenopathy.   Psychiatric/Behavioral: Negative for agitation and  behavioral problems.     Objective:     Vital Signs (Most Recent):  Temp: 98.7 °F (37.1 °C) (02/15/20 1558)  Pulse: 69 (02/15/20 1300)  Resp: 18 (02/15/20 1300)  BP: (!) 124/59 (02/15/20 1558)  SpO2: 99 % (02/15/20 1300) Vital Signs (24h Range):  Temp:  [97.5 °F (36.4 °C)-99 °F (37.2 °C)] 98.7 °F (37.1 °C)  Pulse:  [59-76] 69  Resp:  [10-32] 18  SpO2:  [87 %-100 %] 99 %  BP: (105-141)/(48-84) 124/59     Weight: 69.5 kg (153 lb 3.5 oz)  Body mass index is 23.3 kg/m².    Intake/Output Summary (Last 24 hours) at 2/15/2020 1803  Last data filed at 2/15/2020 0600  Gross per 24 hour   Intake 963.75 ml   Output 200 ml   Net 763.75 ml      Physical Exam   Constitutional: She is oriented to person, place, and time. No distress.   HENT:   Right Ear: External ear normal.   Left Ear: External ear normal.   Eyes: Pupils are equal, round, and reactive to light. EOM are normal.   Neck: Neck supple.   Cardiovascular: Normal rate.   Pulmonary/Chest: Effort normal and breath sounds normal.   Abdominal: Soft. Bowel sounds are normal.   Musculoskeletal: Normal range of motion. She exhibits no edema.   Neurological: She is alert and oriented to person, place, and time.   Skin: Skin is warm.   Bruise/hematoma on RUE/R Thigh area   Psychiatric: She has a normal mood and affect.   Nursing note and vitals reviewed.      Significant Labs:   CBC:   Recent Labs   Lab 02/14/20  0510 02/15/20  0406   WBC 7.76 10.24   HGB 11.2* 9.5*   HCT 34.9* 29.1*    142*     CMP:   Recent Labs   Lab 02/14/20  0510 02/15/20  0406    136   K 3.7 3.8    104   CO2 25 23   * 112*   BUN 18 22   CREATININE 1.1 1.2   CALCIUM 9.6 8.6*   PROT 6.5 5.9*   ALBUMIN 3.5 3.2*   BILITOT 0.2 0.9   ALKPHOS 44* 30*   AST 14 16   ALT 8* 10   ANIONGAP 10 9   EGFRNONAA 49* 44.3*       Significant Imaging: I have reviewed all pertinent imaging results/findings within the past 24 hours.      Assessment/Plan:      * NSTEMI (non-ST elevated myocardial  infarction)  Patient  admitted with the NSTEMI  Patient had a coronary angiogram and a stent was inserted in RCA  Continue aspirin/beta blocker/Plavix/statin  If everything is fine patient can go home tomorrow      HTN (hypertension)  Stable chronic issue      Diabetes mellitus  On sliding scale insulin      Depression  Chronic stable issue  Continue fluoxetine        VTE Risk Mitigation (From admission, onward)         Ordered     IP VTE LOW RISK PATIENT  Once      02/14/20 1223                      Ervin Wiley MD  Department of Hospital Medicine   [facility]

## 2020-02-16 NOTE — PLAN OF CARE
Problem: Adult Inpatient Plan of Care  Goal: Plan of Care Review  Outcome: Met  Goal: Patient-Specific Goal (Individualization)  Outcome: Met  Goal: Absence of Hospital-Acquired Illness or Injury  Outcome: Met  Goal: Optimal Comfort and Wellbeing  Outcome: Met  Goal: Readiness for Transition of Care  Outcome: Met  Goal: Rounds/Family Conference  Outcome: Met     Problem: Fall Injury Risk  Goal: Absence of Fall and Fall-Related Injury  Outcome: Met     Problem: Diabetes Comorbidity  Goal: Blood Glucose Level Within Desired Range  Outcome: Met     Problem: Arrhythmia (Acute Coronary Syndrome)  Goal: Normalized Cardiac Rhythm  Outcome: Met     Problem: Pain (Acute Coronary Syndrome)  Goal: Absence of Cardiac-Related Pain  Outcome: Met     Problem: Tissue Perfusion (Acute Coronary Syndrome)  Goal: Adequate Tissue Perfusion  Outcome: Met     Problem: Skin Injury Risk Increased  Goal: Skin Health and Integrity  Outcome: Met

## 2020-02-16 NOTE — PT/OT/SLP EVAL
Physical Therapy Evaluation    Patient Name:  Queta Ocampo   MRN:  [id number]    Recommendations:     Discharge Recommendations:  nursing facility, skilled, home with home health, home(if goes home will need 24 hour supervision initially for safety)   Discharge Equipment Recommendations: none   Barriers to discharge: lives alone so would benefit from admission to SNF unit for rehab prior to DC home alone    Assessment:     Queta Ocampo is a 75 y.o. female admitted with a medical diagnosis of NSTEMI (non-ST elevated myocardial infarction).  She presents with the following impairments/functional limitations:  weakness, impaired endurance, impaired functional mobilty, gait instability, decreased lower extremity function, impaired balance .  Patient reluctantly agreeable to PT evaluation to include gait training.  Patient presented laying in the bed with her eyes closed not responding to therapist questions until family encouraged her to communicate.  Patient was then able to transfer supine to sit with SBA and sit to stand with CGA but was only able to ambulate x 3 feet with RW with min assist.  After 3 feet patient insisted she had to go and sit back down because her legs were feeling very weak.   Patient lives alone but does not appear safe strong enough to safely go home alone even if family is available during the night. Patient would benefit from admission to SNF unit for rehab prior to discharge home alone.    Rehab Prognosis: Fair; patient would benefit from acute skilled PT services to address these deficits and reach maximum level of function.    Recent Surgery: Procedure(s) (LRB):  Left heart cath (Left)  Stent, Drug Eluting, Single Vessel, Coronary (Right)  ANGIOGRAM, CORONARY ARTERY (N/A) 2 Days Post-Op    Plan:     During this hospitalization, patient to be seen 6 x/week to address the identified rehab impairments via gait training, therapeutic activities, therapeutic exercises and progress toward the  following goals:    · Plan of Care Expires:  03/15/20    Subjective     Chief Complaint: feeling weak  Patient/Family Comments/goals: go home  Pain/Comfort:  ·      Patients cultural, spiritual, Orthodoxy conflicts given the current situation:      Living Environment:  Currently lives alone in 1 story home with 1 small step entry.  Prior to admission, patients level of function was independent.  Equipment used at home: walker, rolling.  DME owned (not currently used): none.  Upon discharge, patient will have assistance from family.    Objective:     Communicated with nurse prior to session.  Patient found supine with    upon PT entry to room.    General Precautions: Standard, fall   Orthopedic Precautions:    Braces:       Exams:  · RLE ROM: Deficits: knee extension -10 degrees taken in sitting  · RLE Strength: Deficits: 3-/5 overall  · LLE ROM: Deficits: knee extension -10 degrees taken in sitting  · LLE Strength: Deficits: 3-/5 overall    Functional Mobility:  · Bed Mobility:     · Supine to Sit: supervision  · Sit to Supine: minimum assistance  · Transfers:     · Sit to Stand:  contact guard assistance with rolling walker  · Gait: x 3 feet with RW with min assist      Therapeutic Activities and Exercises:   none given    AM-PAC 6 CLICK MOBILITY  Total Score:16     Patient left supine with call button in reach, nurse notified and family present.    GOALS:   Multidisciplinary Problems     Physical Therapy Goals        Problem: Physical Therapy Goal    Goal Priority Disciplines Outcome Goal Variances Interventions   Physical Therapy Goal     PT, PT/OT Ongoing, Progressing     Description:  Goals to be met by: 2020    Patient will increase functional independence with mobility by performin. Supine to sit with Modified [place]  2. Sit to supine with Modified [place]  3. Sit to stand transfer with Modified [place]  4. Bed to chair transfer with Modified [place] using Rolling  Walker  5. Gait  x 150 feet with Supervision using Rolling Walker.                       History:     Past Medical History:   Diagnosis Date    Depression     Diabetes mellitus     type 2    Nervous breakdown     Renal disorder     Tachycardia        Past Surgical History:   Procedure Laterality Date    APPENDECTOMY      FOOT SURGERY      SHOULDER SURGERY Left     TUBAL LIGATION         Time Tracking:     PT Received On: 02/16/20  PT Start Time: 1240     PT Stop Time: 1255  PT Total Time (min): 15 min     Billable Minutes: Evaluation 15      Chris MeGilligan, PT  02/16/2020

## 2020-02-17 ENCOUNTER — TELEPHONE (OUTPATIENT)
Dept: CARDIAC REHAB | Facility: HOSPITAL | Age: 75
End: 2020-02-17

## 2020-02-17 NOTE — PLAN OF CARE
Problem: Occupational Therapy Goal  Goal: Occupational Therapy Goal  Outcome: Met   OT initial eval completed and treatment  completed with Stand step t/f and R shld and elbow AROM ex.  Pt needs CGA with t/f and education  in importance of R arm AROM ex with therapy.pt stated she was tired. Pt 's family present for education and training. Pt going home with HH and family today so discharge OT services at this time.

## 2020-02-17 NOTE — PT/OT/SLP EVAL
"Occupational Therapy   Evaluation/Treatment/Discharge summary    Name: Queta Ocampo  MRN: [id number]  Admitting Diagnosis:  NSTEMI (non-ST elevated myocardial infarction) 2 Days Post-Op  Procedure(s):  Left heart cath  Stent, Drug Eluting, Single Vessel, Coronary  ANGIOGRAM, CORONARY ARTERY         Recommendations:     Discharge Recommendations: home health OT(family )  Discharge Equipment Recommendations:  none  Barriers to discharge:  None    Assessment:     Queta Ocampo is a 75 y.o. female with a medical diagnosis of NSTEMI (non-ST elevated myocardial infarction).  She presents with Performance deficits affecting function: weakness, impaired endurance, impaired self care skills, decreased upper extremity function, impaired skin, impaired balance, gait instability, impaired functional mobilty, edema.      Rehab Prognosis: Good; patient would benefit from acute skilled OT services to address these deficits and reach maximum level of function.       Plan:     Patient to be seen   to address the above listed problems via    · Plan of Care Expires:    · Plan of Care Reviewed with: patient, daughter, grandchild(alix)    Subjective     Chief Complaint: feeling tired.  Patient/Family Comments/goals: to go home.    Occupational Profile:  Living Environment: lives alone in [facility] with no steps; tub/shower combo with shower chair.  Previous level of function: pt. Was indep with ADLS; ambulated with walking device sometimes, was driving,family brought over meals but she cooked light meals on stove.  Daughter reported pt  was not able to stand for long because she would get "wobbly" and dizzy.    Roles and Routines: fairly active  Equipment Used at Home:  shower chair, cane, straight, rollator, raised toilet  Assistance upon Discharge: daughter and granddaughter    Pain/Comfort:  · Pain Rating 1: 0/10  · Pain Rating Post-Intervention 1: 0/10    Patients cultural, spiritual, Worship conflicts given the current situation: " "no    Objective:     Communicated with: nurse prior to session.  Patient found HOB elevated with bed alarm, telemetry, blood pressure cuff upon OT entry to room.    General Precautions: Standard, fall   Orthopedic Precautions:N/A   Braces: N/A     Occupational Performance:    Bed Mobility:    · Patient completed Rolling/Turning to Left with  independence  · Patient completed Scooting/Bridging with independence  · Patient completed Supine to Sit with independence    Functional Mobility/Transfers:  · Patient completed Sit <> Stand Transfer with contact guard assistance  with  hand-held assist   · Patient completed Bed <> Chair Transfer using Step Transfer technique with contact guard assistance with hand-held assist  · Patient completed Toilet Transfer Step Transfer technique with contact guard assistance with  hand-held assist  · Functional Mobility: ambulated in room to [facility], bedside chair with HHA, slightly unsteady but no LOB mostly c/o feeling" tired."    Activities of Daily Living:  · Feeding:  independence .  · Grooming: supervision seated  · Lower Body Dressing: moderate assistance socks   · Toileting: refused .    Cognitive/Visual Perceptual:  Cognitive/Psychosocial Skills:     -       Oriented to: Person, Place, Time and Situation   -       Follows Commands/attention:Follows one-step commands  -       Communication: clear/fluent  -       Memory: Poor immediate recall  -       Safety awareness/insight to disability: impaired   -       Mood/Affect/Coping skills/emotional control: Cooperative and Flat affect  Visual/Perceptual:      -Intact .    Physical Exam:  Balance:    -       sitting: good  dynamic: fair plus  standing; fair plus  dynamic;  poor plus  Postural examination/scapula alignment:    -       Rounded shoulders  Skin integrity: RUE severe hematoma/purplish bruising  Edema:  Mild R upper inner arm and Moderate same  Dominant hand:    -       right  Upper Extremity Range of Motion:     -       Right " Upper Extremity: Deficits: shld AROm~ 80 flex and abd and ext rotation, elbow ~ -45 elbow extension, finger  flexion/ext wfl  -       Left Upper Extremity: WFL except shld limited old sx/fx, ~ 80 AROM abduction only  Upper Extremity Strength:    -       Right Upper Extremity: WFL except shld 2+/5; distally 3+/5-4/5  -       Left Upper Extremity: WFL   Strength:    -       Right Upper Extremity: WFL  -       Left Upper Extremity: WFL  Fine Motor Coordination:    -       Intact    AMPAC 6 Click ADL:  AMPAC Total Score: 19    Treatment & Education:  -Role of OT and POC explained to pt and family, verbalized understanding.  -Initiated pt and family education/training in Stand step t/f to BSc and chair with emphasis on importance of OOB activity and sitting up in chair daily.  Also R shld and elbow AROM ex.  Pt needs CGA with t/f and education in importance of R arm AROM ex with therapy, pt did only few reps 2/2  stated she was tired. Pt going home with HH and family today so discharge OT services at this time.    Education:    Patient left up in chair with all lines intact, call button in reach, nurse notified and daughter and granddaughter present    GOALS:   Multidisciplinary Problems     Occupational Therapy Goals     Not on file          Multidisciplinary Problems (Resolved)        Problem: Occupational Therapy Goal    Goal Priority Disciplines Outcome Interventions   Occupational Therapy Goal   (Resolved)     OT, PT/OT Met                    History:     Past Medical History:   Diagnosis Date    Depression     Diabetes mellitus     type 2    Nervous breakdown     Renal disorder     Tachycardia        Past Surgical History:   Procedure Laterality Date    APPENDECTOMY      FOOT SURGERY      SHOULDER SURGERY Left     TUBAL LIGATION         Time Tracking:     OT Date of Treatment: 02/16/20  OT Start Time: 1352  OT Stop Time: 1418  OT Total Time (min): 26 min    Billable Minutes:Evaluation 15  Self  Care/Home Management 11  Total Time 26    Cait Gaytan, OT  2/16/2020

## 2020-02-17 NOTE — TELEPHONE ENCOUNTER
Queta Ocampo   [id number]   2/17/2020         Spoke with: no answer    Received Medications?:not applicable    Follow Up Appt?:not applicable    Cardio Pulmonary Rehab Appt?:not applicable    Comments: no answer    Vi Duncan RN

## 2020-02-17 NOTE — DISCHARGE SUMMARY
[facility] Hospital Medicine  Discharge Summary      Patient Name: Queta Ocampo  MRN: [id number]  Admission Date: 2/13/2020  Hospital Length of Stay: 1 days  Discharge Date and Time:  02/17/2020 11:56 AM  Attending Physician: Cristal att. providers found   Discharging Provider: Breanne Shipman MD  Primary Care Provider: Jean Alexander MD      HPI:   Queta Ocampo is a 75 y.o. Female with past medical history of depression diabetes mellitus who presents to the ED with  on and off chest pain for the last few weeks particularly the last 2 weeks.  Patient states that the pain is pressure-like pain that comes on and lasts for few seconds to minutes and goes away.  Patient states that she has not tried nitroglycerin and hence does not know if that resolves the pain.  Patient states that the pain comes even at rest.  No positional change with respiration.  Patient has mild intensity pain usually but was having pain of intensity 8/10 earlier today.  Patient had chest pain just prior to my arrival last for few seconds and went away.  Patient states that it is not associated with food is not reproducible.  Patient states that pain is radiating to the back of the neck.  Patient states that in the last 2 weeks she has been under lot of stress due to her granddaughter losing her job.  Patient has been depressed more so after the loss of her sister 2 months ago.  Patient denies symptoms of fever, nausea, vomiting, shortness of breath.     The history is provided by the patient.      * No surgery found *      Hospital Course:   75-year-old lady presented to [facility] for chest pain.  She complained of on and off chest pain for the past few weeks.  Cardiology evaluated the patient recommended monitoring the patient overnight and following troponins.  Patient was given nitroglycerin that helped the chest pain.  In the hospital her troponin levels were found to be on higher range and was trending up with  ongoing chest pressure and eventually she was transferred to [facility] for coronary angiogram as per cardiology recommendation.     Consults:   Consults (From admission, onward)        Status Ordering Provider     Inpatient consult to Cardiology  Once     Provider:  Edwin Cervantes MD    Completed GIGI KELLEY III          No new Assessment & Plan notes have been filed under this hospital service since the last note was generated.  Service: Hospital Medicine    Final Active Diagnoses:    Diagnosis Date Noted POA    Depression [F32.9] 04/09/2019 Yes    Mass of right lung [R91.8] 12/02/2017 Yes    Ataxia [R27.0] 12/02/2017 Yes      Problems Resolved During this Admission:    Diagnosis Date Noted Date Resolved POA    PRINCIPAL PROBLEM:  NSTEMI (non-ST elevated myocardial infarction) [I21.4] 02/13/2020 02/16/2020 Yes       Discharged Condition: fair    Disposition: Another Health Care Inst*    Follow Up:    Patient Instructions:   No discharge procedures on file.    Significant Diagnostic Studies: Labs: All labs within the past 24 hours have been reviewed    Pending Diagnostic Studies:     None         Medications:  Reconciled Home Medications:      Medication List      ASK your doctor about these medications    atenoloL 25 MG tablet  Commonly known as:  TENORMIN  Take 25 mg by mouth once daily.     clonazePAM 1 MG tablet  Commonly known as:  KLONOPIN  Take 1 mg by mouth nightly as needed for Anxiety.     FLUoxetine 40 MG capsule  Take 40 mg by mouth once daily.     SITagliptin 100 MG Tab  Commonly known as:  JANUVIA  Take 100 mg by mouth once daily.            Indwelling Lines/Drains at time of discharge:   Lines/Drains/Airways     None                 Time spent on the discharge of patient: 60 minutes  Patient was seen and examined on the date of discharge and determined to be suitable for discharge.         Breanne Shipman MD  Department of Hospital Medicine  [facility]

## 2020-02-17 NOTE — DISCHARGE SUMMARY
Critical access hospital Medicine  Discharge Summary      Patient Name: Queta Ocampo  MRN: [id number]  Admission Date: 2/14/2020  Hospital Length of Stay: 1 days  Discharge Date and Time:  02/16/2020 7:17 PM  Attending Physician: No att. providers found   Discharging Provider: Ervin Wiley MD  Primary Care Provider: Jean Alexander MD      HPI:   75-year-old lady got transferred from [facility] for coronary angiogram  She initially presented to the hospital with a history of on and off chest pain for the past few weeks  Patient recently lost her sister  Per her, the pain was like a pressure-like symptom in the chest with no aggravating and relieving factors  She has tried to treat the pain with nitroglycerin tablets with no effect  In the hospital her troponin levels were found to be on higher range with ongoing chest pressure and eventually she was transferred to this hospital for coronary angiogram  Today she underwent coronary angiogram and a stent was inserted  Postprocedure patient doing fine.  She denies any other complaint except back pain    Procedure(s) (LRB):  Left heart cath (Left)  Stent, Drug Eluting, Single Vessel, Coronary (Right)  ANGIOGRAM, CORONARY ARTERY (N/A)      Hospital Course:   Patient admitted with NSTEMI and had stent in mid RCA  Postoperatively pt did fine except for hematoma . There was no pseudoaneurysm  Patient very depressed and was very anxious  She rejected SNF placement for rehab  She opted to go home with  PT/OT  Plan discussed in detail with pt and Pts Grand daughter  Later she was D/C to home        Consults:     No new Assessment & Plan notes have been filed under this hospital service since the last note was generated.  Service: Hospital Medicine    Final Active Diagnoses:    Diagnosis Date Noted POA    Diabetes mellitus [E11.9] 02/14/2020 Unknown    HTN (hypertension) [I10] 02/14/2020 Unknown    Depression [F32.9] 04/09/2019 Yes      Problems Resolved  During this Admission:    Diagnosis Date Noted Date Resolved POA    PRINCIPAL PROBLEM:  NSTEMI (non-ST elevated myocardial infarction) [I21.4] 02/13/2020 02/16/2020 Yes       Discharged Condition: good    Disposition: Home-Health Care Svc    Follow Up:   Contact information for follow-up providers     Edwin Cervantes MD.    Specialty:  Cardiology  Contact information:  [address]  [address]  [facility]  [place] [address]  [phone]                   Contact information for after-discharge care     Home Medical Care     [facility] .    Service:  Home Health Services  Contact information:  [address]  [facility] [address]  [phone]                           Patient Instructions:      Diet Cardiac     Activity as tolerated       Significant Diagnostic Studies: Labs:   CMP   Recent Labs   Lab 02/15/20  0406 02/16/20  0428    139   K 3.8 3.7    104   CO2 23 24   * 125*   BUN 22 27*   CREATININE 1.2 1.1   CALCIUM 8.6* 9.2   PROT 5.9* 6.7   ALBUMIN 3.2* 3.7   BILITOT 0.9 0.8   ALKPHOS 30* 34*   AST 16 21   ALT 10 11   ANIONGAP 9 11   ESTGFRAFRICA 51.1* 56.8*   EGFRNONAA 44.3* 49.2*    and CBC   Recent Labs   Lab 02/15/20  0406 02/16/20  0428   WBC 10.24 12.25   HGB 9.5* 9.3*   HCT 29.1* 27.9*   * 144*       Pending Diagnostic Studies:     None         Medications:  Reconciled Home Medications:      Medication List      START taking these medications    ALPRAZolam 0.25 MG tablet  Commonly known as:  XANAX  Take 1 tablet (0.25 mg total) by mouth 3 (three) times daily. for 15 doses     aspirin 81 MG Chew  Take 1 tablet (81 mg total) by mouth once daily.  Start taking on:  February 17, 2020     atorvastatin 80 MG tablet  Commonly known as:  LIPITOR  Take 1 tablet (80 mg total) by mouth every evening.     clopidogreL 75 mg tablet  Commonly known as:  PLAVIX  Take 1 tablet (75 mg total) by mouth once daily.  Start taking on:  February 17,  2020     HYDROcodone-acetaminophen 5-325 mg per tablet  Commonly known as:  NORCO  Take 1 tablet by mouth every 8 (eight) hours as needed.     metoprolol tartrate 25 MG tablet  Commonly known as:  LOPRESSOR  Take 0.5 tablets (12.5 mg total) by mouth 2 (two) times daily.        CHANGE how you take these medications    gabapentin 600 MG tablet  Commonly known as:  NEURONTIN  Take 0.5 tablets (300 mg total) by mouth 3 (three) times daily.  What changed:  how much to take        CONTINUE taking these medications    FLUoxetine 40 MG capsule  Take 40 mg by mouth once daily.     SITagliptin 100 MG Tab  Commonly known as:  JANUVIA  Take 100 mg by mouth once daily.        STOP taking these medications    atenoloL 25 MG tablet  Commonly known as:  TENORMIN     clonazePAM 1 MG tablet  Commonly known as:  KLONOPIN            Indwelling Lines/Drains at time of discharge:   Lines/Drains/Airways     None                 Time spent on the discharge of patient: 32 minutes  Patient was seen and examined on the date of discharge and determined to be suitable for discharge.         Ervin Wiley MD  Department of Hospital Medicine  [facility]

## 2020-02-17 NOTE — HOSPITAL COURSE
75-year-old lady presented to [facility] for chest pain.  She complained of on and off chest pain for the past few weeks.  Cardiology evaluated the patient recommended monitoring the patient overnight and following troponins.  Patient was given nitroglycerin that helped the chest pain.  In the hospital her troponin levels were found to be on higher range and was trending up with ongoing chest pressure and eventually she was transferred to [facility] for coronary angiogram as per cardiology recommendation.

## 2020-02-20 ENCOUNTER — HOSPITAL ENCOUNTER (EMERGENCY)
Facility: HOSPITAL | Age: 75
Discharge: HOME | End: 2020-02-20
Attending: EMERGENCY MEDICINE
Payer: MEDICARE

## 2020-02-20 VITALS
WEIGHT: 150 LBS | OXYGEN SATURATION: 96 % | HEART RATE: 70 BPM | HEIGHT: 68 IN | SYSTOLIC BLOOD PRESSURE: 124 MMHG | BODY MASS INDEX: 22.73 KG/M2 | DIASTOLIC BLOOD PRESSURE: 54 MMHG | TEMPERATURE: 99 F | RESPIRATION RATE: 19 BRPM

## 2020-02-20 DIAGNOSIS — K57.90 DIVERTICULOSIS: ICD-10-CM

## 2020-02-20 DIAGNOSIS — R19.7 DIARRHEA, UNSPECIFIED TYPE: Primary | ICD-10-CM

## 2020-02-20 LAB
ALBUMIN SERPL BCP-MCNC: 3.5 G/DL (ref 3.5–5.2)
ALP SERPL-CCNC: 39 U/L (ref 55–135)
ALT SERPL W/O P-5'-P-CCNC: 13 U/L (ref 10–44)
ANION GAP SERPL CALC-SCNC: 9 MMOL/L (ref 8–16)
AST SERPL-CCNC: 17 U/L (ref 10–40)
BACTERIA #/AREA URNS HPF: NEGATIVE /HPF
BASOPHILS # BLD AUTO: 0.02 K/UL (ref 0–0.2)
BASOPHILS NFR BLD: 0.2 % (ref 0–1.9)
BILIRUB SERPL-MCNC: 1.2 MG/DL (ref 0.1–1)
BILIRUB UR QL STRIP: NEGATIVE
BUN SERPL-MCNC: 18 MG/DL (ref 8–23)
CALCIUM SERPL-MCNC: 9 MG/DL (ref 8.7–10.5)
CHLORIDE SERPL-SCNC: 104 MMOL/L (ref 95–110)
CLARITY UR: CLEAR
CO2 SERPL-SCNC: 25 MMOL/L (ref 23–29)
COLOR UR: YELLOW
CREAT SERPL-MCNC: 0.9 MG/DL (ref 0.5–1.4)
DIFFERENTIAL METHOD: ABNORMAL
EOSINOPHIL # BLD AUTO: 0.4 K/UL (ref 0–0.5)
EOSINOPHIL NFR BLD: 3.6 % (ref 0–8)
ERYTHROCYTE [DISTWIDTH] IN BLOOD BY AUTOMATED COUNT: 14.1 % (ref 11.5–14.5)
EST. GFR  (AFRICAN AMERICAN): >60 ML/MIN/1.73 M^2
EST. GFR  (NON AFRICAN AMERICAN): >60 ML/MIN/1.73 M^2
GLUCOSE SERPL-MCNC: 142 MG/DL (ref 70–110)
GLUCOSE UR QL STRIP: NEGATIVE
HCT VFR BLD AUTO: 27.4 % (ref 37–48.5)
HGB BLD-MCNC: 8.9 G/DL (ref 12–16)
HGB UR QL STRIP: ABNORMAL
HYALINE CASTS #/AREA URNS LPF: 5 /LPF
IMM GRANULOCYTES # BLD AUTO: 0.05 K/UL (ref 0–0.04)
IMM GRANULOCYTES NFR BLD AUTO: 0.5 % (ref 0–0.5)
KETONES UR QL STRIP: NEGATIVE
LEUKOCYTE ESTERASE UR QL STRIP: ABNORMAL
LIPASE SERPL-CCNC: 30 U/L (ref 4–60)
LYMPHOCYTES # BLD AUTO: 1.8 K/UL (ref 1–4.8)
LYMPHOCYTES NFR BLD: 17.2 % (ref 18–48)
MCH RBC QN AUTO: 31.3 PG (ref 27–31)
MCHC RBC AUTO-ENTMCNC: 32.5 G/DL (ref 32–36)
MCV RBC AUTO: 97 FL (ref 82–98)
MICROSCOPIC COMMENT: ABNORMAL
MONOCYTES # BLD AUTO: 0.8 K/UL (ref 0.3–1)
MONOCYTES NFR BLD: 7.4 % (ref 4–15)
NEUTROPHILS # BLD AUTO: 7.3 K/UL (ref 1.8–7.7)
NEUTROPHILS NFR BLD: 71.1 % (ref 38–73)
NITRITE UR QL STRIP: NEGATIVE
NRBC BLD-RTO: 0 /100 WBC
PH UR STRIP: 8 [PH] (ref 5–8)
PLATELET # BLD AUTO: 245 K/UL (ref 150–350)
PMV BLD AUTO: 11.1 FL (ref 9.2–12.9)
POTASSIUM SERPL-SCNC: 3.4 MMOL/L (ref 3.5–5.1)
PROT SERPL-MCNC: 7 G/DL (ref 6–8.4)
PROT UR QL STRIP: NEGATIVE
RBC # BLD AUTO: 2.84 M/UL (ref 4–5.4)
RBC #/AREA URNS HPF: 1 /HPF (ref 0–4)
SODIUM SERPL-SCNC: 138 MMOL/L (ref 136–145)
SP GR UR STRIP: 1.02 (ref 1–1.03)
SQUAMOUS #/AREA URNS HPF: 6 /HPF
URN SPEC COLLECT METH UR: ABNORMAL
UROBILINOGEN UR STRIP-ACNC: 1 EU/DL
WBC # BLD AUTO: 10.31 K/UL (ref 3.9–12.7)
WBC #/AREA URNS HPF: 7 /HPF (ref 0–5)

## 2020-02-20 PROCEDURE — 99285 EMERGENCY DEPT VISIT HI MDM: CPT | Mod: 25

## 2020-02-20 PROCEDURE — 85025 COMPLETE CBC W/AUTO DIFF WBC: CPT

## 2020-02-20 PROCEDURE — 36415 COLL VENOUS BLD VENIPUNCTURE: CPT

## 2020-02-20 PROCEDURE — 63600175 PHARM REV CODE 636 W HCPCS: Performed by: EMERGENCY MEDICINE

## 2020-02-20 PROCEDURE — 81001 URINALYSIS AUTO W/SCOPE: CPT

## 2020-02-20 PROCEDURE — 96375 TX/PRO/DX INJ NEW DRUG ADDON: CPT

## 2020-02-20 PROCEDURE — 83690 ASSAY OF LIPASE: CPT

## 2020-02-20 PROCEDURE — 80053 COMPREHEN METABOLIC PANEL: CPT

## 2020-02-20 PROCEDURE — 25500020 PHARM REV CODE 255: Performed by: EMERGENCY MEDICINE

## 2020-02-20 PROCEDURE — 96374 THER/PROPH/DIAG INJ IV PUSH: CPT | Mod: 59

## 2020-02-20 RX ORDER — METRONIDAZOLE 500 MG/1
500 TABLET ORAL 3 TIMES DAILY
Qty: 21 TABLET | Refills: 0 | Status: ON HOLD | OUTPATIENT
Start: 2020-02-20 | End: 2020-02-27 | Stop reason: HOSPADM

## 2020-02-20 RX ORDER — GABAPENTIN 600 MG/1
600 TABLET ORAL 3 TIMES DAILY
COMMUNITY

## 2020-02-20 RX ORDER — ONDANSETRON 2 MG/ML
4 INJECTION INTRAMUSCULAR; INTRAVENOUS
Status: COMPLETED | OUTPATIENT
Start: 2020-02-20 | End: 2020-02-20

## 2020-02-20 RX ORDER — MORPHINE SULFATE 4 MG/ML
2 INJECTION, SOLUTION INTRAMUSCULAR; INTRAVENOUS
Status: COMPLETED | OUTPATIENT
Start: 2020-02-20 | End: 2020-02-20

## 2020-02-20 RX ORDER — MIRTAZAPINE 45 MG/1
45 TABLET, FILM COATED ORAL NIGHTLY
COMMUNITY
Start: 2019-12-31 | End: 2020-02-26 | Stop reason: CLARIF

## 2020-02-20 RX ORDER — ESTRADIOL 0.1 MG/G
CREAM VAGINAL
COMMUNITY
Start: 2019-12-31 | End: 2020-02-20 | Stop reason: SDDI

## 2020-02-20 RX ORDER — ONDANSETRON 4 MG/1
4 TABLET, ORALLY DISINTEGRATING ORAL EVERY 8 HOURS PRN
Qty: 10 TABLET | Refills: 0 | Status: ON HOLD | OUTPATIENT
Start: 2020-02-20 | End: 2020-02-27 | Stop reason: HOSPADM

## 2020-02-20 RX ORDER — CIPROFLOXACIN 500 MG/1
500 TABLET ORAL 2 TIMES DAILY
Qty: 20 TABLET | Refills: 0 | Status: ON HOLD | OUTPATIENT
Start: 2020-02-20 | End: 2020-02-27 | Stop reason: HOSPADM

## 2020-02-20 RX ADMIN — MORPHINE SULFATE 2 MG: 4 INJECTION INTRAVENOUS at 02:02

## 2020-02-20 RX ADMIN — ONDANSETRON HYDROCHLORIDE 4 MG: 2 SOLUTION INTRAMUSCULAR; INTRAVENOUS at 02:02

## 2020-02-20 RX ADMIN — IOHEXOL 100 ML: 350 INJECTION, SOLUTION INTRAVENOUS at 01:02

## 2020-02-20 NOTE — ED PROVIDER NOTES
Encounter Date: 2/20/2020       History     Chief Complaint   Patient presents with    Vomiting     nausea and diarrhea since Sunday     Patient here with reported nausea and diarrhea onset Sunday with some associated vomiting patient is status post cardiac catheterization with stent placement on the 14th she denies any chest pain or shortness of breath she denies any abdominal pain no reported fever chills she does report decreased appetite since with vomiting no other family members are ill no blood in her stool        Review of patient's allergies indicates:   Allergen Reactions    Codeine Itching, Swelling, Rash, Other (See Comments) and Nausea And Vomiting     Facial swelling, itching, rash, erythema    Pcn [penicillins] Itching, Swelling, Rash and Other (See Comments)     Facial swelling with rash, erythema, itching     Past Medical History:   Diagnosis Date    Depression     Diabetes mellitus     type 2    Nervous breakdown     Renal disorder     Tachycardia      Past Surgical History:   Procedure Laterality Date    APPENDECTOMY      CORONARY ANGIOGRAPHY N/A 2/14/2020    Procedure: ANGIOGRAM, CORONARY ARTERY;  Surgeon: Edwin Cervantes MD;  Location: [facility] CATH/EP LAB;  Service: Cardiology;  Laterality: N/A;    FOOT SURGERY      LEFT HEART CATHETERIZATION Left 2/14/2020    Procedure: Left heart cath;  Surgeon: Edwin Cervantes MD;  Location: [facility] CATH/EP LAB;  Service: Cardiology;  Laterality: Left;    SHOULDER SURGERY Left     TUBAL LIGATION       No family history on file.  Social History     Tobacco Use    Smoking status: Current Every Day Smoker     Packs/day: 0.50     Types: Cigarettes    Smokeless tobacco: Never Used   Substance Use Topics    Alcohol use: Not Currently    Drug use: Not Currently     Review of Systems   Constitutional: Positive for appetite change.   HENT: Negative.    Eyes: Negative.    Respiratory: Negative.    Cardiovascular: Negative.    Gastrointestinal:  Positive for diarrhea and nausea. Negative for abdominal distention, abdominal pain, anal bleeding, blood in stool, constipation and vomiting.   Endocrine: Negative.    Genitourinary: Negative.    Musculoskeletal: Negative.    Skin: Negative.    Allergic/Immunologic: Negative.    Neurological: Negative.    Hematological: Negative.    Psychiatric/Behavioral: Negative.        Physical Exam     Initial Vitals [02/20/20 1138]   BP Pulse Resp Temp SpO2   (!) 152/65 78 20 98.8 °F (37.1 °C) 100 %      MAP       --         Physical Exam    Constitutional: She appears well-developed and well-nourished. No distress.   HENT:   Head: Normocephalic and atraumatic.   Right Ear: External ear normal.   Left Ear: External ear normal.   Mouth/Throat: Oropharynx is clear and moist.   Eyes: Conjunctivae and EOM are normal. Pupils are equal, round, and reactive to light.   Neck: Normal range of motion. Neck supple.   Cardiovascular: Normal rate, regular rhythm, normal heart sounds and intact distal pulses.   Pulmonary/Chest: Breath sounds normal. No respiratory distress.   Abdominal: Soft. Bowel sounds are normal.   Musculoskeletal: Normal range of motion. She exhibits no edema or tenderness.   Neurological: She is alert and oriented to person, place, and time. She has normal strength. GCS score is 15. GCS eye subscore is 4. GCS verbal subscore is 5. GCS motor subscore is 6.   Skin: Skin is warm and dry. Capillary refill takes less than 2 seconds.   Psychiatric: She has a normal mood and affect. Her behavior is normal.         ED Course   Procedures  Labs Reviewed   CBC W/ AUTO DIFFERENTIAL - Abnormal; Notable for the following components:       Result Value    RBC 2.84 (*)     Hemoglobin 8.9 (*)     Hematocrit 27.4 (*)     Mean Corpuscular Hemoglobin 31.3 (*)     Immature Grans (Abs) 0.05 (*)     Lymph% 17.2 (*)     All other components within normal limits   COMPREHENSIVE METABOLIC PANEL - Abnormal; Notable for the following  components:    Potassium 3.4 (*)     Glucose 142 (*)     Total Bilirubin 1.2 (*)     Alkaline Phosphatase 39 (*)     All other components within normal limits   LIPASE   URINALYSIS, REFLEX TO URINE CULTURE          Imaging Results    None          Medical Decision Making:   ED Management:  Patient with reported vomiting nausea diarrhea CT scan shows evidence of diverticulosis will treat with Cipro Flagyl Zofran as needed                                 Clinical Impression:       ICD-10-CM ICD-9-CM   1. Diarrhea, unspecified type R19.7 787.91   2. Diverticulosis K57.90 562.10                             Sahil Forman MD  02/20/20 9160

## 2020-02-20 NOTE — ED NOTES
Patient identifiers for Queta Ocampo checked and correct.  LOC: Patient is awake, alert, and aware of environment with an appropriate affect. Patient is oriented x 3 and speaking appropriately.  APPEARANCE: Patient resting comfortably and in no acute distress. Patient is clean and well groomed, patient's clothing is properly fastened.  SKIN: The skin is warm and dry. Patient has poor skin turgor , bruising to R arm and R hip and upper leg  MUSKULOSKELETAL: Patient is moving all extremities well, no obvious deformities noted. Pulses intact.   RESPIRATORY: Airway is open and patent. Respirations are spontaneous and non-labored with normal effort and rate.  CARDIAC: Patient has a normal rate and rhythm. No peripheral edema noted. Capillary refill < 3 seconds.  ABDOMEN: No distention noted. Bowel sounds active in all 4 quadrants. Soft and non-tender upon palpation.  NEUROLOGICAL: PERRL. Facial expression is symmetrical. Hand grasps are equal bilaterally. Normal sensation in all extremities when touched with finger.  Allergies reported:   Review of patient's allergies indicates:   Allergen Reactions    Codeine Itching, Swelling, Rash, Other (See Comments) and Nausea And Vomiting     Facial swelling, itching, rash, erythema    Pcn [penicillins] Itching, Swelling, Rash and Other (See Comments)     Facial swelling with rash, erythema, itching

## 2020-02-26 ENCOUNTER — HOSPITAL ENCOUNTER (OUTPATIENT)
Facility: HOSPITAL | Age: 75
Discharge: HOME | End: 2020-02-27
Attending: EMERGENCY MEDICINE | Admitting: HOSPITALIST
Payer: MEDICARE

## 2020-02-26 DIAGNOSIS — R19.7 VOMITING AND DIARRHEA: ICD-10-CM

## 2020-02-26 DIAGNOSIS — R07.9 CHEST PAIN: ICD-10-CM

## 2020-02-26 DIAGNOSIS — R63.0 ANOREXIA: ICD-10-CM

## 2020-02-26 DIAGNOSIS — R06.02 SHORTNESS OF BREATH: ICD-10-CM

## 2020-02-26 DIAGNOSIS — K52.9 GASTROENTERITIS: Primary | ICD-10-CM

## 2020-02-26 DIAGNOSIS — R11.10 VOMITING AND DIARRHEA: ICD-10-CM

## 2020-02-26 DIAGNOSIS — I50.9 ACUTE HEART FAILURE, UNSPECIFIED HEART FAILURE TYPE: ICD-10-CM

## 2020-02-26 PROBLEM — N18.30 CHRONIC KIDNEY DISEASE, STAGE III (MODERATE): Status: ACTIVE | Noted: 2020-02-26

## 2020-02-26 PROBLEM — D50.9 IRON DEFICIENCY ANEMIA: Status: ACTIVE | Noted: 2020-02-26

## 2020-02-26 PROBLEM — I50.31 ACUTE DIASTOLIC HEART FAILURE: Status: ACTIVE | Noted: 2020-02-26

## 2020-02-26 PROBLEM — E83.42 HYPOMAGNESEMIA: Status: ACTIVE | Noted: 2020-02-26

## 2020-02-26 PROBLEM — E11.42 DIABETIC POLYNEUROPATHY ASSOCIATED WITH TYPE 2 DIABETES MELLITUS: Status: ACTIVE | Noted: 2019-04-09

## 2020-02-26 PROBLEM — E11.29 TYPE 2 DIABETES MELLITUS WITH RENAL COMPLICATION: Status: ACTIVE | Noted: 2020-02-14

## 2020-02-26 LAB
ALBUMIN SERPL BCP-MCNC: 3.4 G/DL (ref 3.5–5.2)
ALP SERPL-CCNC: 46 U/L (ref 55–135)
ALT SERPL W/O P-5'-P-CCNC: 9 U/L (ref 10–44)
ANION GAP SERPL CALC-SCNC: 10 MMOL/L (ref 8–16)
AST SERPL-CCNC: 12 U/L (ref 10–40)
BACTERIA #/AREA URNS HPF: ABNORMAL /HPF
BASOPHILS # BLD AUTO: 0.03 K/UL (ref 0–0.2)
BASOPHILS NFR BLD: 0.3 % (ref 0–1.9)
BILIRUB SERPL-MCNC: 0.7 MG/DL (ref 0.1–1)
BILIRUB UR QL STRIP: NEGATIVE
BNP SERPL-MCNC: 514 PG/ML (ref 0–99)
BUN SERPL-MCNC: 16 MG/DL (ref 8–23)
CALCIUM SERPL-MCNC: 9.4 MG/DL (ref 8.7–10.5)
CHLORIDE SERPL-SCNC: 104 MMOL/L (ref 95–110)
CLARITY UR: CLEAR
CO2 SERPL-SCNC: 26 MMOL/L (ref 23–29)
COLOR UR: YELLOW
CREAT SERPL-MCNC: 1.1 MG/DL (ref 0.5–1.4)
DIFFERENTIAL METHOD: ABNORMAL
EOSINOPHIL # BLD AUTO: 0.4 K/UL (ref 0–0.5)
EOSINOPHIL NFR BLD: 3.6 % (ref 0–8)
ERYTHROCYTE [DISTWIDTH] IN BLOOD BY AUTOMATED COUNT: 15.2 % (ref 11.5–14.5)
EST. GFR  (AFRICAN AMERICAN): 57 ML/MIN/1.73 M^2
EST. GFR  (NON AFRICAN AMERICAN): 49 ML/MIN/1.73 M^2
GLUCOSE SERPL-MCNC: 175 MG/DL (ref 70–110)
GLUCOSE UR QL STRIP: NEGATIVE
HCT VFR BLD AUTO: 31.1 % (ref 37–48.5)
HGB BLD-MCNC: 9.7 G/DL (ref 12–16)
HGB UR QL STRIP: ABNORMAL
HYALINE CASTS #/AREA URNS LPF: 3 /LPF
IMM GRANULOCYTES # BLD AUTO: 0.1 K/UL (ref 0–0.04)
INR PPP: 1.1 (ref 0.8–1.2)
IRON SERPL-MCNC: 37 UG/DL (ref 30–160)
KETONES UR QL STRIP: NEGATIVE
LEUKOCYTE ESTERASE UR QL STRIP: ABNORMAL
LYMPHOCYTES # BLD AUTO: 1.7 K/UL (ref 1–4.8)
LYMPHOCYTES NFR BLD: 14.3 % (ref 18–48)
MAGNESIUM SERPL-MCNC: 1.5 MG/DL (ref 1.6–2.6)
MCH RBC QN AUTO: 31.7 PG (ref 27–31)
MCHC RBC AUTO-ENTMCNC: 31.2 G/DL (ref 32–36)
MCV RBC AUTO: 102 FL (ref 82–98)
MICROSCOPIC COMMENT: ABNORMAL
MONOCYTES # BLD AUTO: 0.9 K/UL (ref 0.3–1)
MONOCYTES NFR BLD: 7.4 % (ref 4–15)
NEUTROPHILS # BLD AUTO: 8.7 K/UL (ref 1.8–7.7)
NEUTROPHILS NFR BLD: 73.6 % (ref 38–73)
NITRITE UR QL STRIP: NEGATIVE
NRBC BLD-RTO: 0 /100 WBC
PH UR STRIP: 7 [PH] (ref 5–8)
PLATELET # BLD AUTO: 310 K/UL (ref 150–350)
PMV BLD AUTO: 10.2 FL (ref 9.2–12.9)
POCT GLUCOSE: 161 MG/DL (ref 70–110)
POTASSIUM SERPL-SCNC: 4 MMOL/L (ref 3.5–5.1)
PROT SERPL-MCNC: 6.8 G/DL (ref 6–8.4)
PROT UR QL STRIP: ABNORMAL
PROTHROMBIN TIME: 11.9 SEC (ref 9–12.5)
RBC # BLD AUTO: 3.06 M/UL (ref 4–5.4)
RBC #/AREA URNS HPF: 5 /HPF (ref 0–4)
SATURATED IRON: 16 % (ref 20–50)
SODIUM SERPL-SCNC: 140 MMOL/L (ref 136–145)
SP GR UR STRIP: 1.02 (ref 1–1.03)
TOTAL IRON BINDING CAPACITY: 228 UG/DL (ref 250–450)
TRANSFERRIN SERPL-MCNC: 154 MG/DL (ref 200–375)
TROPONIN I SERPL DL<=0.01 NG/ML-MCNC: <0.006 NG/ML (ref 0–0.03)
URN SPEC COLLECT METH UR: ABNORMAL
UROBILINOGEN UR STRIP-ACNC: NEGATIVE EU/DL
WBC # BLD AUTO: 11.8 K/UL (ref 3.9–12.7)
WBC #/AREA URNS HPF: 9 /HPF (ref 0–5)

## 2020-02-26 PROCEDURE — 25000003 PHARM REV CODE 250: Performed by: NURSE PRACTITIONER

## 2020-02-26 PROCEDURE — 93010 ELECTROCARDIOGRAM REPORT: CPT | Mod: ,,, | Performed by: INTERNAL MEDICINE

## 2020-02-26 PROCEDURE — 83735 ASSAY OF MAGNESIUM: CPT

## 2020-02-26 PROCEDURE — 93005 ELECTROCARDIOGRAM TRACING: CPT

## 2020-02-26 PROCEDURE — 96375 TX/PRO/DX INJ NEW DRUG ADDON: CPT

## 2020-02-26 PROCEDURE — G0378 HOSPITAL OBSERVATION PER HR: HCPCS

## 2020-02-26 PROCEDURE — 96374 THER/PROPH/DIAG INJ IV PUSH: CPT

## 2020-02-26 PROCEDURE — 96376 TX/PRO/DX INJ SAME DRUG ADON: CPT

## 2020-02-26 PROCEDURE — 85610 PROTHROMBIN TIME: CPT

## 2020-02-26 PROCEDURE — 81000 URINALYSIS NONAUTO W/SCOPE: CPT

## 2020-02-26 PROCEDURE — 36415 COLL VENOUS BLD VENIPUNCTURE: CPT

## 2020-02-26 PROCEDURE — 83036 HEMOGLOBIN GLYCOSYLATED A1C: CPT

## 2020-02-26 PROCEDURE — 80053 COMPREHEN METABOLIC PANEL: CPT

## 2020-02-26 PROCEDURE — 63600175 PHARM REV CODE 636 W HCPCS: Performed by: EMERGENCY MEDICINE

## 2020-02-26 PROCEDURE — 85025 COMPLETE CBC W/AUTO DIFF WBC: CPT

## 2020-02-26 PROCEDURE — 63600175 PHARM REV CODE 636 W HCPCS: Performed by: NURSE PRACTITIONER

## 2020-02-26 PROCEDURE — 83540 ASSAY OF IRON: CPT

## 2020-02-26 PROCEDURE — 96372 THER/PROPH/DIAG INJ SC/IM: CPT | Mod: 59

## 2020-02-26 PROCEDURE — 83880 ASSAY OF NATRIURETIC PEPTIDE: CPT

## 2020-02-26 PROCEDURE — 25000242 PHARM REV CODE 250 ALT 637 W/ HCPCS: Performed by: EMERGENCY MEDICINE

## 2020-02-26 PROCEDURE — 99285 EMERGENCY DEPT VISIT HI MDM: CPT | Mod: 25

## 2020-02-26 PROCEDURE — 94640 AIRWAY INHALATION TREATMENT: CPT

## 2020-02-26 PROCEDURE — 84484 ASSAY OF TROPONIN QUANT: CPT

## 2020-02-26 PROCEDURE — 96361 HYDRATE IV INFUSION ADD-ON: CPT

## 2020-02-26 PROCEDURE — 93010 EKG 12-LEAD: ICD-10-PCS | Mod: ,,, | Performed by: INTERNAL MEDICINE

## 2020-02-26 RX ORDER — LANOLIN ALCOHOL/MO/W.PET/CERES
800 CREAM (GRAM) TOPICAL
Status: DISCONTINUED | OUTPATIENT
Start: 2020-02-26 | End: 2020-02-27 | Stop reason: HOSPADM

## 2020-02-26 RX ORDER — SODIUM,POTASSIUM PHOSPHATES 280-250MG
2 POWDER IN PACKET (EA) ORAL
Status: DISCONTINUED | OUTPATIENT
Start: 2020-02-26 | End: 2020-02-27 | Stop reason: HOSPADM

## 2020-02-26 RX ORDER — CIPROFLOXACIN 500 MG/1
500 TABLET ORAL 2 TIMES DAILY
Status: DISCONTINUED | OUTPATIENT
Start: 2020-02-26 | End: 2020-02-26

## 2020-02-26 RX ORDER — IBUPROFEN 200 MG
24 TABLET ORAL
Status: DISCONTINUED | OUTPATIENT
Start: 2020-02-26 | End: 2020-02-27 | Stop reason: HOSPADM

## 2020-02-26 RX ORDER — INSULIN ASPART 100 [IU]/ML
0-5 INJECTION, SOLUTION INTRAVENOUS; SUBCUTANEOUS
Status: DISCONTINUED | OUTPATIENT
Start: 2020-02-26 | End: 2020-02-27 | Stop reason: HOSPADM

## 2020-02-26 RX ORDER — SODIUM CHLORIDE 0.9 % (FLUSH) 0.9 %
10 SYRINGE (ML) INJECTION
Status: DISCONTINUED | OUTPATIENT
Start: 2020-02-26 | End: 2020-02-27 | Stop reason: HOSPADM

## 2020-02-26 RX ORDER — ONDANSETRON 2 MG/ML
8 INJECTION INTRAMUSCULAR; INTRAVENOUS EVERY 8 HOURS PRN
Status: DISCONTINUED | OUTPATIENT
Start: 2020-02-26 | End: 2020-02-27 | Stop reason: HOSPADM

## 2020-02-26 RX ORDER — ENOXAPARIN SODIUM 100 MG/ML
40 INJECTION SUBCUTANEOUS EVERY 24 HOURS
Status: DISCONTINUED | OUTPATIENT
Start: 2020-02-26 | End: 2020-02-27 | Stop reason: HOSPADM

## 2020-02-26 RX ORDER — METRONIDAZOLE 500 MG/1
500 TABLET ORAL 3 TIMES DAILY
Status: DISCONTINUED | OUTPATIENT
Start: 2020-02-26 | End: 2020-02-26

## 2020-02-26 RX ORDER — POTASSIUM CHLORIDE 20 MEQ/15ML
40 SOLUTION ORAL
Status: DISCONTINUED | OUTPATIENT
Start: 2020-02-26 | End: 2020-02-27 | Stop reason: HOSPADM

## 2020-02-26 RX ORDER — GLUCAGON 1 MG
1 KIT INJECTION
Status: DISCONTINUED | OUTPATIENT
Start: 2020-02-26 | End: 2020-02-27 | Stop reason: HOSPADM

## 2020-02-26 RX ORDER — ONDANSETRON 2 MG/ML
8 INJECTION INTRAMUSCULAR; INTRAVENOUS
Status: COMPLETED | OUTPATIENT
Start: 2020-02-26 | End: 2020-02-26

## 2020-02-26 RX ORDER — PANTOPRAZOLE SODIUM 40 MG/1
40 TABLET, DELAYED RELEASE ORAL DAILY
Status: DISCONTINUED | OUTPATIENT
Start: 2020-02-27 | End: 2020-02-27 | Stop reason: HOSPADM

## 2020-02-26 RX ORDER — FUROSEMIDE 10 MG/ML
40 INJECTION INTRAMUSCULAR; INTRAVENOUS
Status: COMPLETED | OUTPATIENT
Start: 2020-02-26 | End: 2020-02-26

## 2020-02-26 RX ORDER — FLUOXETINE HYDROCHLORIDE 20 MG/1
40 CAPSULE ORAL DAILY
Status: DISCONTINUED | OUTPATIENT
Start: 2020-02-26 | End: 2020-02-27 | Stop reason: HOSPADM

## 2020-02-26 RX ORDER — NAPROXEN SODIUM 220 MG/1
81 TABLET, FILM COATED ORAL DAILY
Status: DISCONTINUED | OUTPATIENT
Start: 2020-02-26 | End: 2020-02-27 | Stop reason: HOSPADM

## 2020-02-26 RX ORDER — GABAPENTIN 300 MG/1
600 CAPSULE ORAL 3 TIMES DAILY
Status: DISCONTINUED | OUTPATIENT
Start: 2020-02-26 | End: 2020-02-27 | Stop reason: HOSPADM

## 2020-02-26 RX ORDER — IBUPROFEN 200 MG
16 TABLET ORAL
Status: DISCONTINUED | OUTPATIENT
Start: 2020-02-26 | End: 2020-02-27 | Stop reason: HOSPADM

## 2020-02-26 RX ORDER — MORPHINE SULFATE 2 MG/ML
2 INJECTION, SOLUTION INTRAMUSCULAR; INTRAVENOUS
Status: COMPLETED | OUTPATIENT
Start: 2020-02-26 | End: 2020-02-26

## 2020-02-26 RX ORDER — ATORVASTATIN CALCIUM 40 MG/1
80 TABLET, FILM COATED ORAL NIGHTLY
Status: DISCONTINUED | OUTPATIENT
Start: 2020-02-26 | End: 2020-02-27 | Stop reason: HOSPADM

## 2020-02-26 RX ORDER — METOPROLOL TARTRATE 25 MG/1
12.5 TABLET ORAL 2 TIMES DAILY
Status: DISCONTINUED | OUTPATIENT
Start: 2020-02-26 | End: 2020-02-27 | Stop reason: HOSPADM

## 2020-02-26 RX ORDER — CLOPIDOGREL BISULFATE 75 MG/1
75 TABLET ORAL DAILY
Status: DISCONTINUED | OUTPATIENT
Start: 2020-02-26 | End: 2020-02-27 | Stop reason: HOSPADM

## 2020-02-26 RX ORDER — IPRATROPIUM BROMIDE AND ALBUTEROL SULFATE 2.5; .5 MG/3ML; MG/3ML
3 SOLUTION RESPIRATORY (INHALATION)
Status: COMPLETED | OUTPATIENT
Start: 2020-02-26 | End: 2020-02-26

## 2020-02-26 RX ADMIN — GABAPENTIN 600 MG: 300 CAPSULE ORAL at 04:02

## 2020-02-26 RX ADMIN — METRONIDAZOLE 500 MG: 500 TABLET ORAL at 03:02

## 2020-02-26 RX ADMIN — IPRATROPIUM BROMIDE AND ALBUTEROL SULFATE 3 ML: .5; 2.5 SOLUTION RESPIRATORY (INHALATION) at 11:02

## 2020-02-26 RX ADMIN — ENOXAPARIN SODIUM 40 MG: 100 INJECTION SUBCUTANEOUS at 04:02

## 2020-02-26 RX ADMIN — ONDANSETRON 8 MG: 2 INJECTION INTRAMUSCULAR; INTRAVENOUS at 10:02

## 2020-02-26 RX ADMIN — TRAZODONE HYDROCHLORIDE 25 MG: 50 TABLET ORAL at 08:02

## 2020-02-26 RX ADMIN — MORPHINE SULFATE 2 MG: 2 INJECTION, SOLUTION INTRAMUSCULAR; INTRAVENOUS at 10:02

## 2020-02-26 RX ADMIN — ATORVASTATIN CALCIUM 80 MG: 40 TABLET, FILM COATED ORAL at 08:02

## 2020-02-26 RX ADMIN — METOPROLOL TARTRATE 12.5 MG: 25 TABLET, FILM COATED ORAL at 08:02

## 2020-02-26 RX ADMIN — ONDANSETRON 8 MG: 2 INJECTION INTRAMUSCULAR; INTRAVENOUS at 04:02

## 2020-02-26 RX ADMIN — GABAPENTIN 600 MG: 300 CAPSULE ORAL at 08:02

## 2020-02-26 RX ADMIN — FUROSEMIDE 40 MG: 10 INJECTION, SOLUTION INTRAMUSCULAR; INTRAVENOUS at 02:02

## 2020-02-26 RX ADMIN — SODIUM CHLORIDE 500 ML: 0.9 INJECTION, SOLUTION INTRAVENOUS at 01:02

## 2020-02-26 NOTE — ASSESSMENT & PLAN NOTE
With mild elevated BNP.  Denies shortness of breath chest x-ray is clear.  Echocardiogram reviewed EF is normal ventricular function with mild diastolic dysfunction.  Will avoid IV hydration.  She was given 1 dose of Lasix IV in the ED.  Add CHF pathway.

## 2020-02-26 NOTE — ASSESSMENT & PLAN NOTE
Check iron studies. Trend cbc  Patient's anemia is currently controlled. Current CBC reviewed-   Lab Results   Component Value Date    HGB 9.7 (L) 02/26/2020    HCT 31.1 (L) 02/26/2020     Monitor serial CBC and transfuse if patient becomes hemodynamically unstable, symptomatic or H/H drops below 7/21.

## 2020-02-26 NOTE — ASSESSMENT & PLAN NOTE
With CKD stage 3.  Monitor renal function.  At baseline renal function.  Hold oral diabetic medications.  Accu-Cheks AC and HS with insulin sliding scale.  Patient's FSGs are controlled on current hypoglycemics.   Last A1c reviewed-   Lab Results   Component Value Date    HGBA1C 6.1 (H) 04/09/2019     Most recent fingerstick glucose reviewed-   Recent Labs   Lab 02/26/20  1726   POCTGLUCOSE 161*     Current correctional scale  Low  Maintain anti-hyperglycemic dose as follows-   Antihyperglycemics (From admission, onward)    Start     Stop Route Frequency Ordered    02/26/20 1543  insulin aspart U-100 pen 0-5 Units      -- SubQ Before meals & nightly PRN 02/26/20 1543

## 2020-02-26 NOTE — UM SECONDARY REVIEW
Physician Advisor External    Level of Care Issue    1434  Sent to EHR for review of 02/26/2020    3506  EHR determination is outpatient for 02/26/2020

## 2020-02-26 NOTE — SUBJECTIVE & OBJECTIVE
Past Medical History:   Diagnosis Date    Coronary artery disease     Depression     Diabetes mellitus     type 2    Hypertension     Nervous breakdown     Renal disorder     Tachycardia        Past Surgical History:   Procedure Laterality Date    APPENDECTOMY      CARDIAC SURGERY      PCI/stent 2/2020    CORONARY ANGIOGRAPHY N/A 2/14/2020    Procedure: ANGIOGRAM, CORONARY ARTERY;  Surgeon: Edwin Cervantes MD;  Location: [facility] CATH/EP LAB;  Service: Cardiology;  Laterality: N/A;    FOOT SURGERY      LEFT HEART CATHETERIZATION Left 2/14/2020    Procedure: Left heart cath;  Surgeon: Edwin Cervantes MD;  Location: [facility] CATH/EP LAB;  Service: Cardiology;  Laterality: Left;    SHOULDER SURGERY Left     TUBAL LIGATION         Review of patient's allergies indicates:   Allergen Reactions    Codeine Itching, Swelling, Rash, Other (See Comments) and Nausea And Vomiting     Facial swelling, itching, rash, erythema    Pcn [penicillins] Itching, Swelling, Rash and Other (See Comments)     Facial swelling with rash, erythema, itching       No current facility-administered medications on file prior to encounter.      Current Outpatient Medications on File Prior to Encounter   Medication Sig    aspirin 81 MG Chew Take 1 tablet (81 mg total) by mouth once daily.    atorvastatin (LIPITOR) 80 MG tablet Take 1 tablet (80 mg total) by mouth every evening.    ciprofloxacin HCl (CIPRO) 500 MG tablet Take 1 tablet (500 mg total) by mouth 2 (two) times daily. for 10 days    clopidogreL (PLAVIX) 75 mg tablet Take 1 tablet (75 mg total) by mouth once daily.    FLUoxetine 40 MG capsule Take 40 mg by mouth once daily.     gabapentin (NEURONTIN) 600 MG tablet Take 600 mg by mouth 3 (three) times daily.    metoprolol tartrate (LOPRESSOR) 25 MG tablet Take 0.5 tablets (12.5 mg total) by mouth 2 (two) times daily.    metroNIDAZOLE (FLAGYL) 500 MG tablet Take 1 tablet (500 mg total) by mouth 3 (three) times daily. for  7 days    ondansetron (ZOFRAN-ODT) 4 MG TbDL Take 1 tablet (4 mg total) by mouth every 8 (eight) hours as needed.    [DISCONTINUED] mirtazapine (REMERON) 45 MG tablet Take 45 mg by mouth nightly.     [DISCONTINUED] SITagliptin (JANUVIA) 100 MG Tab Take 100 mg by mouth once daily.     Family History     Problem Relation (Age of Onset)    Hypertension Father    Stroke Father        Tobacco Use    Smoking status: Former Smoker     Packs/day: 0.50     Years: 30.00     Pack years: 15.00     Types: Cigarettes     Last attempt to quit: 2020     Years since quittin.0    Smokeless tobacco: Never Used   Substance and Sexual Activity    Alcohol use: Not Currently    Drug use: Not Currently    Sexual activity: Not on file     Review of Systems   Constitutional: Positive for activity change, appetite change and fatigue. Negative for diaphoresis and fever.   HENT: Negative for congestion and facial swelling.    Eyes: Negative for photophobia and visual disturbance.   Respiratory: Negative for cough and shortness of breath.    Cardiovascular: Negative for chest pain and leg swelling.   Gastrointestinal: Positive for diarrhea, nausea and vomiting. Negative for abdominal distention, abdominal pain, blood in stool and constipation.   Endocrine: Negative for polyphagia and polyuria.   Genitourinary: Negative for dysuria, frequency, hematuria and urgency.   Musculoskeletal: Negative for arthralgias and back pain.   Skin: Negative for color change and rash.   Neurological: Negative for dizziness, speech difficulty, numbness and headaches.   Psychiatric/Behavioral: Positive for sleep disturbance. Negative for agitation and confusion. The patient is nervous/anxious.      Objective:     Vital Signs (Most Recent):  Temp: 97.5 °F (36.4 °C) (20 1500)  Pulse: 73 (20 1500)  Resp: 18 (20 1500)  BP: (!) 162/69 (20 1500)  SpO2: 96 % (20 1500) Vital Signs (24h Range):  Temp:  [97.5 °F (36.4 °C)-97.8 °F  (36.6 °C)] 97.5 °F (36.4 °C)  Pulse:  [69-76] 73  Resp:  [16-18] 18  SpO2:  [95 %-97 %] 96 %  BP: (143-162)/(69-85) 162/69     Weight: 68.3 kg (150 lb 9.2 oz)  Body mass index is 22.89 kg/m².    Physical Exam   Constitutional: She is oriented to person, place, and time. She appears well-developed and well-nourished.   HENT:   Head: Normocephalic and atraumatic.   Right Ear: External ear normal.   Left Ear: External ear normal.   Mouth/Throat: Oropharynx is clear and moist.   Eyes: Pupils are equal, round, and reactive to light. Conjunctivae and EOM are normal.   Neck: Normal range of motion. Neck supple. No JVD present.   Cardiovascular: Normal rate, regular rhythm, normal heart sounds and intact distal pulses.   No murmur heard.  Pulmonary/Chest: Effort normal. She has no wheezes. She has rales.   Abdominal: Soft. Bowel sounds are normal. She exhibits no distension. There is no tenderness.   Musculoskeletal: Normal range of motion.   Neurological: She is alert and oriented to person, place, and time. Coordination normal.   Skin: Skin is warm and dry. No erythema.   Bruising to right upper extremity generalized.   Psychiatric: She has a normal mood and affect. Her behavior is normal. Judgment normal.   Nursing note and vitals reviewed.        CRANIAL NERVES     CN III, IV, VI   Pupils are equal, round, and reactive to light.  Extraocular motions are normal.        Significant Labs:   BMP:   Recent Labs   Lab 02/26/20  1036   *      K 4.0      CO2 26   BUN 16   CREATININE 1.1   CALCIUM 9.4   MG 1.5*     CBC:   Recent Labs   Lab 02/26/20  1036   WBC 11.80   HGB 9.7*   HCT 31.1*          Significant Imaging: I have reviewed and interpreted all pertinent imaging results/findings within the past 24 hours.

## 2020-02-26 NOTE — HPI
Queta Ocampo is a 75 y.o. female with a past medical history of hypertension, hyperlipidemia, diabetes mellitus type 2 and recent NSTEMI with drug-eluting stent placement on 02/14/2020 who presents to the ED for evaluation of nausea vomiting diarrhea and anorexia for 3 weeks.  She reports 1 episode of diarrhea since this morning and vomiting 5-6 times in the last 24 hr.  She states she was diagnosed with diverticulitis and initiated on Cipro and Flagyl 6 days ago.  She reports generalized weakness and fatigue.  She denies black stool or bloody stools.  She denies abdominal pain.    CT of the abdomen on 02/20/2020 which demonstrates Sigmoid diverticulosis without diverticulitis and 3.1 cm infrarenal abdominal aortic aneurysm.

## 2020-02-26 NOTE — H&P
[facility] Medicine  History & Physical    Patient Name: Queta Ocampo  MRN: [id number]  Admission Date: 2/26/2020  Attending Physician: Nona Garcia NP  Primary Care Provider: Jean Alexander MD         Patient information was obtained from patient and ER records.     Subjective:     Principal Problem:Gastroenteritis    Chief Complaint:   Chief Complaint   Patient presents with    Fatigue    Emesis        HPI: Queta Ocampo is a 75 y.o. female with a past medical history of hypertension, hyperlipidemia, diabetes mellitus type 2 and recent NSTEMI with drug-eluting stent placement on 02/14/2020 who presents to the ED for evaluation of nausea vomiting diarrhea and anorexia for 3 weeks.  She reports 1 episode of diarrhea since this morning and vomiting 5-6 times in the last 24 hr.  She states she was diagnosed with diverticulitis and initiated on Cipro and Flagyl 6 days ago.  She reports generalized weakness and fatigue.  She denies black stool or bloody stools.  She denies abdominal pain.    CT of the abdomen on 02/20/2020 which demonstrates Sigmoid diverticulosis without diverticulitis and 3.1 cm infrarenal abdominal aortic aneurysm.     Past Medical History:   Diagnosis Date    Coronary artery disease     Depression     Diabetes mellitus     type 2    Hypertension     Nervous breakdown     Renal disorder     Tachycardia        Past Surgical History:   Procedure Laterality Date    APPENDECTOMY      CARDIAC SURGERY      PCI/stent 2/2020    CORONARY ANGIOGRAPHY N/A 2/14/2020    Procedure: ANGIOGRAM, CORONARY ARTERY;  Surgeon: Edwin Cervantes MD;  Location: [facility] CATH/EP LAB;  Service: Cardiology;  Laterality: N/A;    FOOT SURGERY      LEFT HEART CATHETERIZATION Left 2/14/2020    Procedure: Left heart cath;  Surgeon: Edwin Cervantes MD;  Location: [facility] CATH/EP LAB;  Service: Cardiology;  Laterality: Left;    SHOULDER SURGERY Left     TUBAL LIGATION         Review of  patient's allergies indicates:   Allergen Reactions    Codeine Itching, Swelling, Rash, Other (See Comments) and Nausea And Vomiting     Facial swelling, itching, rash, erythema    Pcn [penicillins] Itching, Swelling, Rash and Other (See Comments)     Facial swelling with rash, erythema, itching       No current facility-administered medications on file prior to encounter.      Current Outpatient Medications on File Prior to Encounter   Medication Sig    aspirin 81 MG Chew Take 1 tablet (81 mg total) by mouth once daily.    atorvastatin (LIPITOR) 80 MG tablet Take 1 tablet (80 mg total) by mouth every evening.    ciprofloxacin HCl (CIPRO) 500 MG tablet Take 1 tablet (500 mg total) by mouth 2 (two) times daily. for 10 days    clopidogreL (PLAVIX) 75 mg tablet Take 1 tablet (75 mg total) by mouth once daily.    FLUoxetine 40 MG capsule Take 40 mg by mouth once daily.     gabapentin (NEURONTIN) 600 MG tablet Take 600 mg by mouth 3 (three) times daily.    metoprolol tartrate (LOPRESSOR) 25 MG tablet Take 0.5 tablets (12.5 mg total) by mouth 2 (two) times daily.    metroNIDAZOLE (FLAGYL) 500 MG tablet Take 1 tablet (500 mg total) by mouth 3 (three) times daily. for 7 days    ondansetron (ZOFRAN-ODT) 4 MG TbDL Take 1 tablet (4 mg total) by mouth every 8 (eight) hours as needed.    [DISCONTINUED] mirtazapine (REMERON) 45 MG tablet Take 45 mg by mouth nightly.     [DISCONTINUED] SITagliptin (JANUVIA) 100 MG Tab Take 100 mg by mouth once daily.     Family History     Problem Relation (Age of Onset)    Hypertension Father    Stroke Father        Tobacco Use    Smoking status: Former Smoker     Packs/day: 0.50     Years: 30.00     Pack years: 15.00     Types: Cigarettes     Last attempt to quit: 2020     Years since quittin.0    Smokeless tobacco: Never Used   Substance and Sexual Activity    Alcohol use: Not Currently    Drug use: Not Currently    Sexual activity: Not on file     Review of Systems    Constitutional: Positive for activity change, appetite change and fatigue. Negative for diaphoresis and fever.   HENT: Negative for congestion and facial swelling.    Eyes: Negative for photophobia and visual disturbance.   Respiratory: Negative for cough and shortness of breath.    Cardiovascular: Negative for chest pain and leg swelling.   Gastrointestinal: Positive for diarrhea, nausea and vomiting. Negative for abdominal distention, abdominal pain, blood in stool and constipation.   Endocrine: Negative for polyphagia and polyuria.   Genitourinary: Negative for dysuria, frequency, hematuria and urgency.   Musculoskeletal: Negative for arthralgias and back pain.   Skin: Negative for color change and rash.   Neurological: Negative for dizziness, speech difficulty, numbness and headaches.   Psychiatric/Behavioral: Positive for sleep disturbance. Negative for agitation and confusion. The patient is nervous/anxious.      Objective:     Vital Signs (Most Recent):  Temp: 97.5 °F (36.4 °C) (02/26/20 1500)  Pulse: 73 (02/26/20 1500)  Resp: 18 (02/26/20 1500)  BP: (!) 162/69 (02/26/20 1500)  SpO2: 96 % (02/26/20 1500) Vital Signs (24h Range):  Temp:  [97.5 °F (36.4 °C)-97.8 °F (36.6 °C)] 97.5 °F (36.4 °C)  Pulse:  [69-76] 73  Resp:  [16-18] 18  SpO2:  [95 %-97 %] 96 %  BP: (143-162)/(69-85) 162/69     Weight: 68.3 kg (150 lb 9.2 oz)  Body mass index is 22.89 kg/m².    Physical Exam   Constitutional: She is oriented to person, place, and time. She appears well-developed and well-nourished.   HENT:   Head: Normocephalic and atraumatic.   Right Ear: External ear normal.   Left Ear: External ear normal.   Mouth/Throat: Oropharynx is clear and moist.   Eyes: Pupils are equal, round, and reactive to light. Conjunctivae and EOM are normal.   Neck: Normal range of motion. Neck supple. No JVD present.   Cardiovascular: Normal rate, regular rhythm, normal heart sounds and intact distal pulses.   No murmur heard.  Pulmonary/Chest:  Effort normal. She has no wheezes. She has rales.   Abdominal: Soft. Bowel sounds are normal. She exhibits no distension. There is no tenderness.   Musculoskeletal: Normal range of motion.   Neurological: She is alert and oriented to person, place, and time. Coordination normal.   Skin: Skin is warm and dry. No erythema.   Bruising to right upper extremity generalized.   Psychiatric: She has a normal mood and affect. Her behavior is normal. Judgment normal.   Nursing note and vitals reviewed.        CRANIAL NERVES     CN III, IV, VI   Pupils are equal, round, and reactive to light.  Extraocular motions are normal.        Significant Labs:   BMP:   Recent Labs   Lab 02/26/20  1036   *      K 4.0      CO2 26   BUN 16   CREATININE 1.1   CALCIUM 9.4   MG 1.5*     CBC:   Recent Labs   Lab 02/26/20  1036   WBC 11.80   HGB 9.7*   HCT 31.1*          Significant Imaging: I have reviewed and interpreted all pertinent imaging results/findings within the past 24 hours.    Assessment/Plan:     * Gastroenteritis  Patient with no evidence of pre renal a KI due to dehydration.  She is slightly elevated BNP.  Will not give IV fluids at this time.  She is placed on clear liquid.  Antiemetics p.r.n..  Check stool studies.      Chronic kidney disease, stage III (moderate)  Stable at baseline renal function.      Iron deficiency anemia  Check iron studies. Trend cbc  Patient's anemia is currently controlled. Current CBC reviewed-   Lab Results   Component Value Date    HGB 9.7 (L) 02/26/2020    HCT 31.1 (L) 02/26/2020     Monitor serial CBC and transfuse if patient becomes hemodynamically unstable, symptomatic or H/H drops below 7/21.         Hypomagnesemia  Replaced with oral supplementation      Acute diastolic heart failure  With mild elevated BNP.  Denies shortness of breath chest x-ray is clear.  Echocardiogram reviewed EF is normal ventricular function with mild diastolic dysfunction.  Will avoid IV  hydration.  She was given 1 dose of Lasix IV in the ED.  Add CHF pathway.      Type 2 diabetes mellitus with renal complication    With CKD stage 3.  Monitor renal function.  At baseline renal function.  Hold oral diabetic medications.  Accu-Cheks AC and HS with insulin sliding scale.  Patient's FSGs are controlled on current hypoglycemics.   Last A1c reviewed-   Lab Results   Component Value Date    HGBA1C 6.1 (H) 04/09/2019     Most recent fingerstick glucose reviewed-   Recent Labs   Lab 02/26/20  1726   POCTGLUCOSE 161*     Current correctional scale  Low  Maintain anti-hyperglycemic dose as follows-   Antihyperglycemics (From admission, onward)    Start     Stop Route Frequency Ordered    02/26/20 1543  insulin aspart U-100 pen 0-5 Units      -- SubQ Before meals & nightly PRN 02/26/20 1543            Diabetic polyneuropathy associated with type 2 diabetes mellitus    Chronic resume home gabapentin.  Fall precautions.      VTE Risk Mitigation (From admission, onward)         Ordered     enoxaparin injection 40 mg  Daily      02/26/20 1543     IP VTE HIGH RISK PATIENT  Once      02/26/20 1543                   Nona Garcia NP  Department of Hospital Medicine   [facility]

## 2020-02-26 NOTE — ED PROVIDER NOTES
Encounter Date: 2/26/2020    SCRIBE #1 NOTE: Liz PAUL, am scribing for, and in the presence of, Severiano Donahue MD.       History     Chief Complaint   Patient presents with    Fatigue    Emesis       Time seen by provider: 10:01 AM on 02/26/2020    Queta Ocampo is a 75 y.o. female who presents the ED with complaints of vomiting, nausea, diarrhea, decreased appetite, abdominal pain, and worsening SOB since x3 weeks ago. The patient reports having a stent placed at [facility] x2 week ago secondary to a heart attack. She was diagnosed with diverticulosis x6 days ago. The patient has tried zofran without relief. She denies black stool, blood in stol, dysuria, chest pain, or any other symptoms at this time. PMHx of DM, tachycardia, and renal disorder.    The history is provided by the patient.     Review of patient's allergies indicates:   Allergen Reactions    Codeine Itching, Swelling, Rash, Other (See Comments) and Nausea And Vomiting     Facial swelling, itching, rash, erythema    Pcn [penicillins] Itching, Swelling, Rash and Other (See Comments)     Facial swelling with rash, erythema, itching     Past Medical History:   Diagnosis Date    Depression     Diabetes mellitus     type 2    Nervous breakdown     Renal disorder     Tachycardia      Past Surgical History:   Procedure Laterality Date    APPENDECTOMY      CORONARY ANGIOGRAPHY N/A 2/14/2020    Procedure: ANGIOGRAM, CORONARY ARTERY;  Surgeon: Edwin Cervantes MD;  Location: [facility] CATH/EP LAB;  Service: Cardiology;  Laterality: N/A;    FOOT SURGERY      LEFT HEART CATHETERIZATION Left 2/14/2020    Procedure: Left heart cath;  Surgeon: Edwin Cervantes MD;  Location: [facility] CATH/EP LAB;  Service: Cardiology;  Laterality: Left;    SHOULDER SURGERY Left     TUBAL LIGATION       No family history on file.  Social History     Tobacco Use    Smoking status: Current Every Day Smoker     Packs/day: 0.50     Types: Cigarettes    Smokeless tobacco: Never  Used   Substance Use Topics    Alcohol use: Not Currently    Drug use: Not Currently     Review of Systems   Constitutional: Positive for appetite change.   HENT: Negative for congestion.    Eyes: Negative for visual disturbance.   Respiratory: Positive for shortness of breath.    Cardiovascular: Negative for chest pain.   Gastrointestinal: Positive for abdominal pain, diarrhea, nausea and vomiting.   Genitourinary: Negative for difficulty urinating and dysuria.   Musculoskeletal: Negative for myalgias.   Skin: Negative for rash.   Hematological: Does not bruise/bleed easily.       Physical Exam     Initial Vitals   BP Pulse Resp Temp SpO2   02/26/20 0950 02/26/20 0950 02/26/20 0950 02/26/20 0954 02/26/20 0950   (!) 143/85 70 16 97.8 °F (36.6 °C) 97 %      MAP       --                Physical Exam    Nursing note and vitals reviewed.  Constitutional: She appears well-developed.   Nontoxic, well appearing female.    HENT:   Head: Normocephalic and atraumatic.   Mouth/Throat: Mucous membranes are dry.   Eyes: EOM are normal. Pupils are equal, round, and reactive to light.   Neck: Neck supple. No JVD present.   Cardiovascular: Normal rate, regular rhythm and intact distal pulses. Exam reveals no gallop and no friction rub.    Murmur heard.   Systolic murmur is present.  Pulmonary/Chest: No respiratory distress. She has no decreased breath sounds. She has no wheezes. She has no rhonchi. She has rales.   Rales to bilateral bases. Scar noted to left anterior shoulder.   Abdominal: Soft. Bowel sounds are normal. She exhibits no distension. There is tenderness.   Mild LLQ tenderness.   Musculoskeletal: Normal range of motion.   Neurological: She is alert and oriented to person, place, and time.   Skin: Skin is warm and dry.   Psychiatric: She has a normal mood and affect.         ED Course   Procedures  Labs Reviewed   CBC W/ AUTO DIFFERENTIAL - Abnormal; Notable for the following components:       Result Value    RBC  3.06 (*)     Hemoglobin 9.7 (*)     Hematocrit 31.1 (*)     Mean Corpuscular Volume 102 (*)     Mean Corpuscular Hemoglobin 31.7 (*)     Mean Corpuscular Hemoglobin Conc 31.2 (*)     RDW 15.2 (*)     Gran # (ANC) 8.7 (*)     Immature Grans (Abs) 0.10 (*)     Gran% 73.6 (*)     Lymph% 14.3 (*)     All other components within normal limits   COMPREHENSIVE METABOLIC PANEL - Abnormal; Notable for the following components:    Glucose 175 (*)     Albumin 3.4 (*)     Alkaline Phosphatase 46 (*)     ALT 9 (*)     eGFR if  57 (*)     eGFR if non  49 (*)     All other components within normal limits   B-TYPE NATRIURETIC PEPTIDE - Abnormal; Notable for the following components:     (*)     All other components within normal limits   URINALYSIS, REFLEX TO URINE CULTURE - Abnormal; Notable for the following components:    Protein, UA 1+ (*)     Occult Blood UA Trace (*)     Leukocytes, UA Trace (*)     All other components within normal limits    Narrative:     Preferred Collection Type->Urine, Clean Catch   URINALYSIS MICROSCOPIC - Abnormal; Notable for the following components:    RBC, UA 5 (*)     WBC, UA 9 (*)     Bacteria Few (*)     Hyaline Casts, UA 3 (*)     All other components within normal limits    Narrative:     Preferred Collection Type->Urine, Clean Catch   CLOSTRIDIUM DIFFICILE   TROPONIN I   PROTIME-INR        ECG Results          EKG 12-lead (In process)  Result time 02/26/20 10:24:31    In process by Interface, Lab In [facility] (02/26/20 10:24:31)                 Narrative:    Test Reason : R06.02,    Vent. Rate : 070 BPM     Atrial Rate : 070 BPM     P-R Int : 168 ms          QRS Dur : 082 ms      QT Int : 412 ms       P-R-T Axes : 076 075 072 degrees     QTc Int : 444 ms    Normal sinus rhythm  Normal ECG  When compared with ECG of 15-FEB-2020 05:34,  No significant change was found    Referred By: [email or web address]   SELF           Confirmed By:                              Imaging Results          X-Ray Chest AP Portable (Final result)  Result time 02/26/20 10:41:25    Final result by Gris Ricardo MD (02/26/20 10:41:25)                 Impression:      No acute cardiopulmonary disease.      Electronically signed by: Gris Ricardo MD  Date:    02/26/2020  Time:    10:41             Narrative:    EXAMINATION:  XR CHEST AP PORTABLE    CLINICAL HISTORY:  CHF;    TECHNIQUE:  Single frontal view of the chest was performed.    COMPARISON:  02/12/2020    FINDINGS:  The cardiomediastinal silhouette is stable.  The lungs are clear of infiltrate.  There is no pleural effusion.                                 Medical Decision Making:   History:   Old Medical Records: I decided to obtain old medical records.  Clinical Tests:   Lab Tests: Reviewed and Ordered  Radiological Study: Ordered and Reviewed  Medical Tests: Reviewed and Ordered  Patient states she has had significant vomiting and diarrhea over the past 3 weeks prior to her having her NSTEMI.  She recently had a CT scan at [facility] on February 20th that showed diverticulosis but not diverticulitis and nothing else acute.  I do not think she has an infectious process however the patient has persistent vomiting and diarrhea is not able to tolerate p.o..  She is also complaining of increasing dyspnea over the past few days.  Her BNP is elevated and she has rales on exam.  Chest x-ray does not show any fulminant heart failure however given the elevated BNP recent and STEMI shortness of breath dyspnea on exertion and the fact that she has been unable to follow up with her cardiologist secondary to an appointment on Isacc Gras day was canceled and she is not on diuretics I think she needs to be observed to get this straightened out.            Scribe Attestation:   Scribe #1: I performed the above scribed service and the documentation accurately describes the services I performed. I attest to the accuracy of the note.    I,   Severiano Donahue personally performed the services described in this documentation. All medical record entries made by the scribe were at my direction and in my presence.  I have reviewed the chart and agree that the record reflects my personal performance and is accurate and complete. Severiano Donahue MD.  2:13 PM 02/26/2020    DISCLAIMER: This note was prepared with Dragon NaturallySpeaking voice recognition transcription software. Garbled syntax, mangled pronouns, and other bizarre constructions may be attributed to that software system         ED Course as of Feb 26 1408   Wed Feb 26, 2020   1050 EKG shows rate 70 normal sinus rate rhythm axis and intervals with no ST segment elevation or depression.    [JS]   1134 Chest x-ray shows no acute cardiopulmonary process.    [JS]   1236 Patient had heart catheterization with stent placement  12 days ago and has an elevated BNP with rales at the bases but her chest x-ray does not show any signs of significant pulmonary edema. She appears to be tachypneic during my exam with a respiratory rate in the lower 20s.  She is not on any diuretics.  Her echo showed mild aortic stenosis but no significant decrease in ejection fraction after her heart catheterization.  I am concerned she may be developing heart failure.  She up was supposed to see her cardiologist as an outpatient yesterday but the office was closed secondary to Isacc Jackson.  I will discuss the case with the cardiologist.  She is also complaining of nausea vomiting diarrhea and anorexia.  She was seen at [facility] 6 days ago and had a negative CT scan for acute infectious process but did have diverticulosis.  Her H&H is stable compared to 6 days ago.  She did not have urinary tract infection at that time.  These labs have been repeated.  I will discuss the case with cardiologist potential the hospitalist.    [JS]      ED Course User Index  [JS] Severiano Donahue MD                Clinical Impression:        ICD-10-CM ICD-9-CM   1. Acute heart failure, unspecified heart failure type I50.9 428.9   2. Shortness of breath R06.02 786.05   3. Vomiting and diarrhea R11.10 787.03    R19.7 787.91   4. Anorexia R63.0 783.0         Disposition:                         Severiano Donahue MD  02/26/20 4890

## 2020-02-26 NOTE — ASSESSMENT & PLAN NOTE
Patient with no evidence of pre renal a KI due to dehydration.  She is slightly elevated BNP.  Will not give IV fluids at this time.  She is placed on clear liquid.  Antiemetics p.r.n..  Check stool studies.

## 2020-02-27 VITALS
DIASTOLIC BLOOD PRESSURE: 70 MMHG | RESPIRATION RATE: 17 BRPM | OXYGEN SATURATION: 96 % | TEMPERATURE: 99 F | SYSTOLIC BLOOD PRESSURE: 154 MMHG | BODY MASS INDEX: 22.82 KG/M2 | HEART RATE: 74 BPM | HEIGHT: 68 IN | WEIGHT: 150.56 LBS

## 2020-02-27 LAB
ANION GAP SERPL CALC-SCNC: 9 MMOL/L (ref 8–16)
BASOPHILS # BLD AUTO: 0.05 K/UL (ref 0–0.2)
BASOPHILS NFR BLD: 0.5 % (ref 0–1.9)
BUN SERPL-MCNC: 12 MG/DL (ref 8–23)
CALCIUM SERPL-MCNC: 9.1 MG/DL (ref 8.7–10.5)
CHLORIDE SERPL-SCNC: 103 MMOL/L (ref 95–110)
CO2 SERPL-SCNC: 27 MMOL/L (ref 23–29)
CREAT SERPL-MCNC: 1.1 MG/DL (ref 0.5–1.4)
DIFFERENTIAL METHOD: ABNORMAL
EOSINOPHIL # BLD AUTO: 0.9 K/UL (ref 0–0.5)
EOSINOPHIL NFR BLD: 8.2 % (ref 0–8)
ERYTHROCYTE [DISTWIDTH] IN BLOOD BY AUTOMATED COUNT: 15.3 % (ref 11.5–14.5)
EST. GFR  (AFRICAN AMERICAN): 57 ML/MIN/1.73 M^2
EST. GFR  (NON AFRICAN AMERICAN): 49 ML/MIN/1.73 M^2
ESTIMATED AVG GLUCOSE: 114 MG/DL (ref 68–131)
GLUCOSE SERPL-MCNC: 120 MG/DL (ref 70–110)
HBA1C MFR BLD HPLC: 5.6 % (ref 4–5.6)
HCT VFR BLD AUTO: 29.7 % (ref 37–48.5)
HGB BLD-MCNC: 9.4 G/DL (ref 12–16)
IMM GRANULOCYTES # BLD AUTO: 0.04 K/UL (ref 0–0.04)
LYMPHOCYTES # BLD AUTO: 2.1 K/UL (ref 1–4.8)
LYMPHOCYTES NFR BLD: 19.7 % (ref 18–48)
MCH RBC QN AUTO: 31.6 PG (ref 27–31)
MCHC RBC AUTO-ENTMCNC: 31.6 G/DL (ref 32–36)
MCV RBC AUTO: 100 FL (ref 82–98)
MONOCYTES # BLD AUTO: 0.9 K/UL (ref 0.3–1)
MONOCYTES NFR BLD: 8.1 % (ref 4–15)
NEUTROPHILS # BLD AUTO: 6.6 K/UL (ref 1.8–7.7)
NEUTROPHILS NFR BLD: 63.1 % (ref 38–73)
NRBC BLD-RTO: 0 /100 WBC
PLATELET # BLD AUTO: 314 K/UL (ref 150–350)
PMV BLD AUTO: 10.2 FL (ref 9.2–12.9)
POCT GLUCOSE: 107 MG/DL (ref 70–110)
POCT GLUCOSE: 114 MG/DL (ref 70–110)
POCT GLUCOSE: 180 MG/DL (ref 70–110)
POTASSIUM SERPL-SCNC: 3.9 MMOL/L (ref 3.5–5.1)
RBC # BLD AUTO: 2.97 M/UL (ref 4–5.4)
SODIUM SERPL-SCNC: 139 MMOL/L (ref 136–145)
WBC # BLD AUTO: 10.46 K/UL (ref 3.9–12.7)

## 2020-02-27 PROCEDURE — 25000003 PHARM REV CODE 250: Performed by: NURSE PRACTITIONER

## 2020-02-27 PROCEDURE — 85025 COMPLETE CBC W/AUTO DIFF WBC: CPT

## 2020-02-27 PROCEDURE — 80048 BASIC METABOLIC PNL TOTAL CA: CPT

## 2020-02-27 PROCEDURE — 36415 COLL VENOUS BLD VENIPUNCTURE: CPT

## 2020-02-27 PROCEDURE — 97803 MED NUTRITION INDIV SUBSEQ: CPT

## 2020-02-27 PROCEDURE — 94761 N-INVAS EAR/PLS OXIMETRY MLT: CPT

## 2020-02-27 PROCEDURE — G0378 HOSPITAL OBSERVATION PER HR: HCPCS

## 2020-02-27 PROCEDURE — 63600175 PHARM REV CODE 636 W HCPCS: Performed by: NURSE PRACTITIONER

## 2020-02-27 PROCEDURE — 96376 TX/PRO/DX INJ SAME DRUG ADON: CPT

## 2020-02-27 RX ORDER — DIPHENHYDRAMINE HCL 25 MG
25 CAPSULE ORAL ONCE
Status: COMPLETED | OUTPATIENT
Start: 2020-02-27 | End: 2020-02-27

## 2020-02-27 RX ORDER — ACETAMINOPHEN 325 MG/1
650 TABLET ORAL EVERY 6 HOURS PRN
Status: DISCONTINUED | OUTPATIENT
Start: 2020-02-27 | End: 2020-02-27 | Stop reason: HOSPADM

## 2020-02-27 RX ORDER — ONDANSETRON 8 MG/1
8 TABLET, ORALLY DISINTEGRATING ORAL EVERY 8 HOURS PRN
Qty: 15 TABLET | Refills: 1 | Status: SHIPPED | OUTPATIENT
Start: 2020-02-27

## 2020-02-27 RX ADMIN — ONDANSETRON 8 MG: 2 INJECTION INTRAMUSCULAR; INTRAVENOUS at 06:02

## 2020-02-27 RX ADMIN — PANTOPRAZOLE SODIUM 40 MG: 40 TABLET, DELAYED RELEASE ORAL at 08:02

## 2020-02-27 RX ADMIN — ACETAMINOPHEN 650 MG: 325 TABLET ORAL at 03:02

## 2020-02-27 RX ADMIN — CLOPIDOGREL BISULFATE 75 MG: 75 TABLET ORAL at 08:02

## 2020-02-27 RX ADMIN — FLUOXETINE 40 MG: 20 CAPSULE ORAL at 08:02

## 2020-02-27 RX ADMIN — METOPROLOL TARTRATE 12.5 MG: 25 TABLET, FILM COATED ORAL at 08:02

## 2020-02-27 RX ADMIN — DIPHENHYDRAMINE HYDROCHLORIDE 25 MG: 25 CAPSULE ORAL at 03:02

## 2020-02-27 RX ADMIN — ASPIRIN 81 MG CHEWABLE TABLET 81 MG: 81 TABLET CHEWABLE at 08:02

## 2020-02-27 RX ADMIN — GABAPENTIN 600 MG: 300 CAPSULE ORAL at 08:02

## 2020-02-27 NOTE — PLAN OF CARE
Pt was active with [facility] prior to admission. CM requested hh orders from Nona Garcia NP.        02/27/20 0923   Post-Acute Status   Post-Acute Authorization Home Health/Hospice   Home Health/Hospice Status Awaiting Orders for HH

## 2020-02-27 NOTE — PLAN OF CARE
Pt accepted by [facility]. Destination booked in [facility]. AVS updated.     Referral History  Date Status/Notes Created By   · 2/27/2020 11:48:49 AM Completed  Lakeisha Foy  · 2/27/2020 11:40:24 AM Accepted: carrie conway  Anum [email or web address]  · 2/27/2020 11:36:38 AM New: pt discharging this afternoon! Thank you!  Lakeisha Foy     02/27/20 1150   Post-Acute Status   Post-Acute Authorization Home Health/Hospice   Post-Acute Placement Status Set-up Complete

## 2020-02-27 NOTE — PLAN OF CARE
Hospital follow up scheduled with PCP. AVS updated.   FELI attempted to schedule follow up with Dr. Cervantes. Per , she will have to talk with Dr. Wilson's nurse to see when they can see pt. Stated she will call pt with an apt date/time.        02/27/20 3523   Discharge Assessment   Assessment Type Discharge Planning Reassessment

## 2020-02-27 NOTE — PLAN OF CARE
Spoke with pt. Pt verified name, , address, and phone number. PCP is Jean Alexander MD. Insurance is Medicare. Pharmacy is [facility] ([address] [place]). Emergency contact is her daughter Estefania Salazar ([phone]) and Anila Garza ([phone]). Pt states Anila is her Medical Power of . Pt states that she lives at home but her grandchildren stay with her and help her at times. She states that she is independent with ADLs. Pt denies ability to drive, dialysis, O2, and coumadin use. Pt states she has a walker but does not use it and a shower chair. Her daughter or grandson will drive pt home. Pt states she was hospitalized within the last 30 days at [facility] for MI approximately 3 wk ago. Pt is visited twice weekly by a nurse from [facility]. No needs voiced at this time.     20 0900   Discharge Assessment   Assessment Type Discharge Planning Assessment   Confirmed/corrected address and phone number on facesheet? Yes   Assessment information obtained from? Patient   Prior to hospitilization cognitive status: Alert/Oriented   Prior to hospitalization functional status: Assistive Equipment   Current cognitive status: Alert/Oriented   Current Functional Status: Assistive Equipment   Lives With alone   Able to Return to Prior Arrangements yes   Is patient able to care for self after discharge? Yes   Patient's perception of discharge disposition home health   Readmission Within the Last 30 Days current reason for admission unrelated to previous admission   If yes, most recent facility name: [facility] for MI approximately 3 wk ago   Patient currently being followed by outpatient case management? No   Patient currently receives any other outside agency services? Yes   Name and contact number of agency or person providing outside services [facility]-nurse comes twice weekly   Is it the patient/care giver preference to resume care with the current  outside agency? Yes   Equipment Currently Used at Home walker, standard;shower chair   Do you have any problems affording any of your prescribed medications? No   Is the patient taking medications as prescribed? yes   Does the patient have transportation home? Yes   Transportation Anticipated family or friend will provide   Discharge Plan A Home

## 2020-02-27 NOTE — PLAN OF CARE
CM sent home health packet and orders to [facility] via [facility] for review.     Referral History  Date Status/Notes Created By   · 2/27/2020 11:36:38 AM New: pt discharging this afternoon! Thank you!  Lakeisha Foy       02/27/20 1136   Post-Acute Status   Post-Acute Authorization Home Health/Hospice   Home Health/Hospice Status Referrals Sent

## 2020-02-27 NOTE — PLAN OF CARE
02/27/20 1413   Final Note   Assessment Type Final Discharge Note   Anticipated Discharge Disposition Home-Health   Hospital Follow Up  Appt(s) scheduled? Yes

## 2020-02-27 NOTE — PROGRESS NOTES
Patient refused bed alarm states she is able to go without assistance as she does at home, reiterated fall and safety precautions.

## 2020-02-27 NOTE — NURSING
Discharge instructions given. Follow-up appointments and medications explained. Understanding verbalized. IV and telemetry removed. Pt escorted out in wheelchair per staff.

## 2020-02-27 NOTE — PLAN OF CARE
POC reviewed, AAO x 4, VSS, Cardiac monitored SR, Glucose monitored to sliding scale, patient complained of leg pain, PRN pain medication given, also complained that trazodone did not assist with sleep, benadryl given to assist, patient states she takes klonopin at home informed on call physician, no recorded in home medication.  Bed in low position, call light within reach, SR up x 2, instructed to call for assistance, contact isolation maintained, patient has not had BM, up to bathroom, free from falls, safety maintained, will continue to monitor and round.

## 2020-02-27 NOTE — PLAN OF CARE
02/27/20 0829   PRE-TX-O2   O2 Device (Oxygen Therapy) room air   SpO2 95 %   Pulse Oximetry Type Intermittent   $ Pulse Oximetry - Multiple Charge Pulse Oximetry - Multiple

## 2020-02-27 NOTE — PLAN OF CARE
CM has tried to schedule hospital follow up with PCP multiple times. No answer at office. CM will try again at later time.       02/27/20 1137   Discharge Assessment   Assessment Type Discharge Planning Reassessment

## 2020-02-27 NOTE — CONSULTS
Food & Nutrition                                                           Education    Diet Education: Diet for CHF/ diverticulosis  Time Spent: 20 Minutes  Learners: Patient      Nutrition Education provided with handouts: yes      Comments: Patient with gastroenteritis r/t antibiotic and diverticulosis per MD. Patient with N/V but feeling better today, also volume overloaded. She states she is generally too depressed to cook for herself and she doesn't drive. So her daughter brings her fast food most meals. Discussed lower sodium convenient options as well as label reading and provided written materials for pt and her daughter. Pt has been avoiding fiber since feeling sick, discussed adding fiber back into her diet slowly and considering probiotic.     NFPE done 2/2/20: mild wasting seen  No wt loss per chart review, volume overload r/t CHF. Pt was meeting 75% of meals most days, drinking 2 Boost daily at home.   Does not meet 2 criteria for malnutrition at this time.    All questions and concerns answered. Dietitian's contact information provided.       Follow-Up: Yes    Please Re-consult as needed        Thanks!

## 2020-02-28 NOTE — HOSPITAL COURSE
Patient monitor closely during observation stay.  She was initiated on IV hydration and CBC and BMP trended.  Oral antibiotics discontinued.  It was felt patient likely having side effects due to antibiotics.  She completed 7 days of antibiotics.  She was placed on clear liquids and diet advanced as tolerated.  She is doing well this morning is tolerating diet.  She is stable for discharge to home with home health.    Physical exam  Abdomen soft nontender nondistended  Chest clear auscultation.  No further nausea vomiting or diarrhea

## 2020-02-28 NOTE — DISCHARGE SUMMARY
[facility] Hospital Medicine  Discharge Summary      Patient Name: Queta Ocampo  MRN: [id number]  Admission Date: 2/26/2020  Hospital Length of Stay: 0 days  Discharge Date and Time: 2/27/2020  2:20 PM  Attending Physician: No att. providers found   Discharging Provider: Nona Harden NP  Primary Care Provider: Jean Alexander MD      HPI:   Queta Ocampo is a 75 y.o. female with a past medical history of hypertension, hyperlipidemia, diabetes mellitus type 2 and recent NSTEMI with drug-eluting stent placement on 02/14/2020 who presents to the ED for evaluation of nausea vomiting diarrhea and anorexia for 3 weeks.  She reports 1 episode of diarrhea since this morning and vomiting 5-6 times in the last 24 hr.  She states she was diagnosed with diverticulitis and initiated on Cipro and Flagyl 6 days ago.  She reports generalized weakness and fatigue.  She denies black stool or bloody stools.  She denies abdominal pain.    CT of the abdomen on 02/20/2020 which demonstrates Sigmoid diverticulosis without diverticulitis and 3.1 cm infrarenal abdominal aortic aneurysm.     * No surgery found *      Hospital Course:   Patient monitor closely during observation stay.  She was initiated on IV hydration and CBC and BMP trended.  Magnesium placed with oral supplementation.  Oral antibiotics discontinued.  It was felt patient likely having side effects due to antibiotics.  She completed 7 days of antibiotics.  She was placed on clear liquids and diet advanced as tolerated.  She is doing well this morning is tolerating diet.  She is stable for discharge to home with home health.    Physical exam  Abdomen soft nontender nondistended  Chest clear auscultation.  No further nausea vomiting or diarrhea     Consults:   Consults (From admission, onward)        Status Ordering Provider     Inpatient consult to Registered Dietitian/Nutritionist  Once     Provider:  (Not yet assigned)    Completed NONA HARDEN  A.     Inpatient consult to Social Work/Case Management  Once     Provider:  (Not yet assigned)    Completed MITCHELL HARDEN          No new Assessment & Plan notes have been filed under this hospital service since the last note was generated.  Service: Hospital Medicine    Final Active Diagnoses:    Diagnosis Date Noted POA    PRINCIPAL PROBLEM:  Gastroenteritis [K52.9] 02/26/2020 Yes    Acute diastolic heart failure [I50.31] 02/26/2020 Yes    Hypomagnesemia [E83.42] 02/26/2020 Yes    Iron deficiency anemia [D50.9] 02/26/2020 Yes    Chronic kidney disease, stage III (moderate) [N18.3] 02/26/2020 Yes    Type 2 diabetes mellitus with renal complication [E11.29] 02/14/2020 Yes    Diabetic polyneuropathy associated with type 2 diabetes mellitus [E11.42] 04/09/2019 Yes      Problems Resolved During this Admission:       Discharged Condition: stable    Disposition: Home or Self Care    Follow Up:  Follow-up Information     Jean Alexander MD On 3/5/2020.    Specialty:  Family Medicine  Why:  9:00 AM for hospital follow up  Contact information:  [address] [facility]  [place] [address]  [phone]             Edwin Cervantes MD.    Specialty:  Cardiology  Contact information:  [address]  [address]  [place] [address]  [phone]             [facility].    Specialties:  Physical Therapy, Home Health Services  Contact information:  [address]  [address]  [place] [address]  [phone]                 Patient Instructions:      Ambulatory referral/consult to Cardiology   Standing Status: Future   Referral Priority: Routine Referral Type: Consultation   Referral Reason: Specialty Services Required   Requested Specialty: Cardiology   Number of Visits Requested: 1     Ambulatory referral/consult to Home Health   Standing Status: Future   Referral Priority: Routine Referral Type: Home Health   Referral Reason: Specialty  Services Required   Requested Specialty: Home Health Services   Number of Visits Requested: 1     Diet diabetic     Diet Cardiac     Call MD for:  temperature >100.4     Call MD for:  persistent nausea and vomiting or diarrhea     Call MD for:  difficulty breathing or increased cough     Call MD for:  severe persistent headache     Call MD for:  persistent dizziness, light-headedness, or visual disturbances     Call MD for:  increased confusion or weakness     Activity as tolerated       Significant Diagnostic Studies: Labs:   BMP:   Recent Labs   Lab 02/27/20  0509   *      K 3.9      CO2 27   BUN 12   CREATININE 1.1   CALCIUM 9.1    and CMP   Recent Labs   Lab 02/27/20  0509      K 3.9      CO2 27   *   BUN 12   CREATININE 1.1   CALCIUM 9.1   ANIONGAP 9   ESTGFRAFRICA 57*   EGFRNONAA 49*       Pending Diagnostic Studies:     None         Medications:  Reconciled Home Medications:      Medication List      CHANGE how you take these medications    ondansetron 8 MG Tbdl  Commonly known as:  ZOFRAN-ODT  Take 1 tablet (8 mg total) by mouth every 8 (eight) hours as needed.  What changed:    · medication strength  · how much to take        CONTINUE taking these medications    aspirin 81 MG Chew  Take 1 tablet (81 mg total) by mouth once daily.     atorvastatin 80 MG tablet  Commonly known as:  LIPITOR  Take 1 tablet (80 mg total) by mouth every evening.     clopidogreL 75 mg tablet  Commonly known as:  PLAVIX  Take 1 tablet (75 mg total) by mouth once daily.     FLUoxetine 40 MG capsule  Take 40 mg by mouth once daily.     gabapentin 600 MG tablet  Commonly known as:  NEURONTIN  Take 600 mg by mouth 3 (three) times daily.     metoprolol tartrate 25 MG tablet  Commonly known as:  LOPRESSOR  Take 0.5 tablets (12.5 mg total) by mouth 2 (two) times daily.        STOP taking these medications    ciprofloxacin HCl 500 MG tablet  Commonly known as:  CIPRO     metroNIDAZOLE 500 MG  tablet  Commonly known as:  FLAGYL            Indwelling Lines/Drains at time of discharge:   Lines/Drains/Airways     None                 Time spent on the discharge of patient: 60 minutes  Patient was seen and examined on the date of discharge and determined to be suitable for discharge.         Nona Garcia NP  Department of Hospital Medicine  [facility]

## 2020-03-03 DIAGNOSIS — I70.213 ATHEROSCLEROSIS OF NATIVE ARTERY OF BOTH LOWER EXTREMITIES WITH INTERMITTENT CLAUDICATION: Primary | ICD-10-CM

## 2020-03-03 DIAGNOSIS — I25.10 CORONARY ATHEROSCLEROSIS OF NATIVE CORONARY ARTERY: ICD-10-CM

## 2020-03-05 ENCOUNTER — CLINICAL SUPPORT (OUTPATIENT)
Dept: CARDIOLOGY | Facility: HOSPITAL | Age: 75
End: 2020-03-05
Attending: INTERNAL MEDICINE
Payer: MEDICARE

## 2020-03-05 DIAGNOSIS — I70.213 ATHEROSCLEROSIS OF NATIVE ARTERY OF BOTH LOWER EXTREMITIES WITH INTERMITTENT CLAUDICATION: ICD-10-CM

## 2020-03-05 DIAGNOSIS — I25.10 CORONARY ATHEROSCLEROSIS OF NATIVE CORONARY ARTERY: ICD-10-CM

## 2020-03-05 LAB — CATH EF QUANTITATIVE: 60 %

## 2020-03-05 PROCEDURE — 93923 UPR/LXTR ART STDY 3+ LVLS: CPT | Mod: 50

## 2020-03-10 LAB
LEFT ABI: 0.88
LEFT ARM BP: 127 MMHG
LEFT CALF BP: 122 MMHG
LEFT POSTERIOR TIBIAL: 122 MMHG
LEFT UPPER LEG BP: 159 MMHG
RIGHT ABI: 1.03
RIGHT ARM BP: 139 MMHG
RIGHT CALF BP: 163 MMHG
RIGHT POSTERIOR TIBIAL: 143 MMHG
RIGHT UPPER LEG BP: 165 MMHG

## 2020-03-10 NOTE — ED PROVIDER NOTES
"Encounter Date: 12/2/2017    SCRIBE #1 NOTE: I, Cesia Martinez, am scribing for, and in the presence of, Dr. Alarcon.       History     Chief Complaint   Patient presents with    Shortness of Breath     Dx with pneumonia on 11/27/17 @ [facility] in [place], [place] - also discovered "lung mass" @ that time. Continues to be with difficult breathing since that time       12/02/2017 2:25 PM     Chief complaint: SOB      Queta Ocampo is a 72 y.o. female with who presents to the ED via EMS with an onset of worsening SOB and weakness x2-3 days. The patient reports that she was discharged from [facility], where she was diagnosed with PNA and a lung mass. She was prescribed Z-Shade, which she finished today. The patient states that she currently smokes about a half a pack of cigarettes a day, and she uses an albuterol inhaler every 4 hours at home. The patient's caretaker states that she has refused to get out of bed or to eat for the past few days. The patient endorses 1 episode of diarrhea, but denies fever, vomiting, blood in stool, hematuria, dysuria, chest pain, and abdominal pain. There is no pertinent SHx noted.      The history is provided by the patient and a relative.     Review of patient's allergies indicates:   Allergen Reactions    Codeine Itching, Swelling, Rash and Other (See Comments)     Facial swelling, itching, rash, erythema    Pcn [penicillins] Itching, Swelling, Rash and Other (See Comments)     Facial swelling with rash, erythema, itching     Past Medical History:   Diagnosis Date    Depression     Nervous breakdown      Past Surgical History:   Procedure Laterality Date    APPENDECTOMY      TUBAL LIGATION       History reviewed. No pertinent family history.  Social History   Substance Use Topics    Smoking status: Current Every Day Smoker     Packs/day: 0.50     Types: Cigarettes    Smokeless tobacco: Not on file    Alcohol use Not on file     Review of Systems   Constitutional: " Positive for activity change and appetite change. Negative for fever.   HENT: Negative for sore throat.    Respiratory: Positive for shortness of breath.    Cardiovascular: Negative for chest pain.   Gastrointestinal: Positive for diarrhea. Negative for abdominal pain, blood in stool, nausea and vomiting.   Genitourinary: Negative for dysuria and hematuria.   Musculoskeletal: Negative for back pain.   Skin: Negative for rash.   Neurological: Positive for weakness.   Hematological: Does not bruise/bleed easily.       Physical Exam     Initial Vitals [12/02/17 1409]   BP Pulse Resp Temp SpO2   (!) 145/67 68 18 99.3 °F (37.4 °C) 96 %      MAP       93         Physical Exam    Nursing note and vitals reviewed.  Constitutional: She appears well-developed and well-nourished. She is not diaphoretic. No distress.   HENT:   Head: Normocephalic and atraumatic.   Mouth/Throat: Mucous membranes are dry.   Eyes: EOM are normal. Pupils are equal, round, and reactive to light.   Neck: Normal range of motion. Neck supple.   Cardiovascular: Normal rate, regular rhythm and intact distal pulses. Exam reveals no gallop and no friction rub.    Murmur heard.  Pulmonary/Chest: No respiratory distress. She has no wheezes. She has no rhonchi.   Mild crackles to lung bases.    Abdominal: Soft. Bowel sounds are normal. There is no tenderness. There is no rebound and no guarding.   Musculoskeletal: Normal range of motion.   Neurological: She is alert and oriented to person, place, and time.   Skin: Skin is warm and dry.   Psychiatric: She has a normal mood and affect. Her behavior is normal. Judgment and thought content normal.         ED Course   Procedures  Labs Reviewed   COMPREHENSIVE METABOLIC PANEL - Abnormal; Notable for the following:        Result Value    Glucose 114 (*)     Albumin 2.4 (*)     Alkaline Phosphatase 54 (*)     AST 9 (*)     ALT 7 (*)     All other components within normal limits   CBC W/ AUTO DIFFERENTIAL - Abnormal;  Notable for the following:     RBC 3.81 (*)     Hemoglobin 11.7 (*)     Hematocrit 35.4 (*)     Platelets 362 (*)     MPV 7.1 (*)     Mono% 3.5 (*)     All other components within normal limits   B-TYPE NATRIURETIC PEPTIDE - Abnormal; Notable for the following:      (*)     All other components within normal limits   CULTURE, BLOOD   CULTURE, BLOOD   LACTIC ACID, PLASMA   TROPONIN I   INFLUENZA A AND B ANTIGEN        Imaging Results          X-Ray Chest 1 View (Final result)  Result time 12/02/17 15:14:16    Final result by Saulo Pardo Jr., MD (12/02/17 15:14:16)                 Impression:     Small right upper lobe infiltrate consistent with pneumonia.      Electronically signed by: Saulo Pardo MD  Date:     12/02/17  Time:    15:14              Narrative:    Single AP view of the chest is performed. Mediastinal cardiac size and contour are normal. There is a new small infiltrate in the right upper lobe. Rest lung fields are clear. No pneumothorax is seen.                                 Medical Decision Making:   History:   Old Medical Records: I decided to obtain old medical records.  Initial Assessment:   72-year-old female presented with a chief complaint shortness of breath.  Differential Diagnosis:   My differential diagnosis includes  PNA, bronchitis, lung mass, and dehydration  Clinical Tests:   Lab Tests: Ordered and Reviewed  Radiological Study: Ordered and Reviewed  Medical Tests: Ordered and Reviewed  ED Management:  The patient was emergently evaluated in the department, her evaluation was certificate for an elderly female with generalized weakness.  The patient's x-ray does show right upper lobe infiltrate per my independent interpretation.  The patient's labs showed no acute abnormalities.  The patient did get hypoxic when standing, and will require further workup and treatment.  I've discussed the case with the hospitalist on-call, Dr. Benson.  He has accepted the patient for  admission.   Other:   I have discussed this case with another health care provider.            Scribe Attestation:   Scribe #1: I performed the above scribed service and the documentation accurately describes the services I performed. I attest to the accuracy of the note.        I, Dr. Librado Alarcon, personally performed the services described in this documentation. All medical record entries made by the scribe were at my direction and in my presence.  I have reviewed the chart and agree that the record reflects my personal performance and is accurate and complete. Librado Alarcon MD.  2:43 PM 12/03/2017       ED Course      Clinical Impression:   The primary encounter diagnosis was Ataxia. A diagnosis of SOB (shortness of breath) was also pertinent to this visit.    Disposition:   Disposition: Admitted                        Librado Alarcon MD  12/03/17 1441       Librado Alarcon MD  12/03/17 1442     Detail Level: Detailed Consent: The risks of atrophy were reviewed with the patient. Ndc# For Kenalog Only: 5356-2941-69 X Size Of Lesion In Cm (Optional): 0 Concentration Of Solution Injected (Mg/Ml): 2.0 Lot # (Optional): [id number] Expiration Date (Optional): JUN 2021 Include Z78.9 (Other Specified Conditions Influencing Health Status) As An Associated Diagnosis?: No Administered By (Optional): Dr. Chiqui Anand Treatment Number (Optional): 1 Medical Necessity Clause: This procedure was medically necessary because the lesions that were treated were: Kenalog Preparation: Kenalog Total Volume Injected (Ccs- Only Use Numbers And Decimals): 0.2

## 2020-04-22 ENCOUNTER — DOCUMENT SCAN (OUTPATIENT)
Dept: HOME HEALTH SERVICES | Facility: HOSPITAL | Age: 75
End: 2020-04-22

## 2020-04-28 ENCOUNTER — EXTERNAL HOME HEALTH (OUTPATIENT)
Dept: HOME HEALTH SERVICES | Facility: HOSPITAL | Age: 75
End: 2020-04-28

## 2020-05-21 ENCOUNTER — HOSPITAL ENCOUNTER (EMERGENCY)
Facility: HOSPITAL | Age: 75
Discharge: LEFT BEFORE DISCHARGE | End: 2020-05-21
Attending: EMERGENCY MEDICINE
Payer: MEDICARE

## 2020-05-21 VITALS
OXYGEN SATURATION: 95 % | BODY MASS INDEX: 22.73 KG/M2 | DIASTOLIC BLOOD PRESSURE: 58 MMHG | TEMPERATURE: 100 F | RESPIRATION RATE: 18 BRPM | HEIGHT: 68 IN | SYSTOLIC BLOOD PRESSURE: 122 MMHG | HEART RATE: 68 BPM | WEIGHT: 150 LBS

## 2020-05-21 DIAGNOSIS — R06.02 SOB (SHORTNESS OF BREATH): ICD-10-CM

## 2020-05-21 DIAGNOSIS — R06.2 WHEEZING: ICD-10-CM

## 2020-05-21 DIAGNOSIS — J40 BRONCHITIS: Primary | ICD-10-CM

## 2020-05-21 LAB
ALBUMIN SERPL BCP-MCNC: 3.1 G/DL (ref 3.5–5.2)
ALP SERPL-CCNC: 147 U/L (ref 55–135)
ALT SERPL W/O P-5'-P-CCNC: 26 U/L (ref 10–44)
ANION GAP SERPL CALC-SCNC: 10 MMOL/L (ref 8–16)
AST SERPL-CCNC: 18 U/L (ref 10–40)
BASOPHILS # BLD AUTO: 0.03 K/UL (ref 0–0.2)
BASOPHILS NFR BLD: 0.4 % (ref 0–1.9)
BILIRUB SERPL-MCNC: 0.3 MG/DL (ref 0.1–1)
BNP SERPL-MCNC: 75 PG/ML (ref 0–99)
BUN SERPL-MCNC: 20 MG/DL (ref 8–23)
CALCIUM SERPL-MCNC: 9.5 MG/DL (ref 8.7–10.5)
CHLORIDE SERPL-SCNC: 102 MMOL/L (ref 95–110)
CO2 SERPL-SCNC: 24 MMOL/L (ref 23–29)
CREAT SERPL-MCNC: 1.3 MG/DL (ref 0.5–1.4)
DIFFERENTIAL METHOD: ABNORMAL
EOSINOPHIL # BLD AUTO: 0.5 K/UL (ref 0–0.5)
EOSINOPHIL NFR BLD: 6.1 % (ref 0–8)
ERYTHROCYTE [DISTWIDTH] IN BLOOD BY AUTOMATED COUNT: 13 % (ref 11.5–14.5)
EST. GFR  (AFRICAN AMERICAN): 46 ML/MIN/1.73 M^2
EST. GFR  (NON AFRICAN AMERICAN): 40 ML/MIN/1.73 M^2
GLUCOSE SERPL-MCNC: 98 MG/DL (ref 70–110)
HCT VFR BLD AUTO: 34.1 % (ref 37–48.5)
HGB BLD-MCNC: 10.6 G/DL (ref 12–16)
IMM GRANULOCYTES # BLD AUTO: 0.03 K/UL (ref 0–0.04)
IMM GRANULOCYTES NFR BLD AUTO: 0.4 % (ref 0–0.5)
LYMPHOCYTES # BLD AUTO: 2.2 K/UL (ref 1–4.8)
LYMPHOCYTES NFR BLD: 28.6 % (ref 18–48)
MCH RBC QN AUTO: 30.4 PG (ref 27–31)
MCHC RBC AUTO-ENTMCNC: 31.1 G/DL (ref 32–36)
MCV RBC AUTO: 98 FL (ref 82–98)
MONOCYTES # BLD AUTO: 0.8 K/UL (ref 0.3–1)
MONOCYTES NFR BLD: 9.9 % (ref 4–15)
NEUTROPHILS # BLD AUTO: 4.2 K/UL (ref 1.8–7.7)
NEUTROPHILS NFR BLD: 54.6 % (ref 38–73)
NRBC BLD-RTO: 0 /100 WBC
PLATELET # BLD AUTO: 247 K/UL (ref 150–350)
PMV BLD AUTO: 10 FL (ref 9.2–12.9)
POTASSIUM SERPL-SCNC: 4.2 MMOL/L (ref 3.5–5.1)
PROT SERPL-MCNC: 7.1 G/DL (ref 6–8.4)
RBC # BLD AUTO: 3.49 M/UL (ref 4–5.4)
SARS-COV-2 RDRP RESP QL NAA+PROBE: NEGATIVE
SODIUM SERPL-SCNC: 136 MMOL/L (ref 136–145)
TROPONIN I SERPL DL<=0.01 NG/ML-MCNC: 0.01 NG/ML (ref 0–0.03)
WBC # BLD AUTO: 7.66 K/UL (ref 3.9–12.7)

## 2020-05-21 PROCEDURE — 80053 COMPREHEN METABOLIC PANEL: CPT

## 2020-05-21 PROCEDURE — 93005 ELECTROCARDIOGRAM TRACING: CPT

## 2020-05-21 PROCEDURE — 83880 ASSAY OF NATRIURETIC PEPTIDE: CPT

## 2020-05-21 PROCEDURE — 85025 COMPLETE CBC W/AUTO DIFF WBC: CPT

## 2020-05-21 PROCEDURE — 36415 COLL VENOUS BLD VENIPUNCTURE: CPT

## 2020-05-21 PROCEDURE — U0002 COVID-19 LAB TEST NON-CDC: HCPCS

## 2020-05-21 PROCEDURE — 99285 EMERGENCY DEPT VISIT HI MDM: CPT | Mod: 25

## 2020-05-21 PROCEDURE — 84484 ASSAY OF TROPONIN QUANT: CPT

## 2020-05-21 RX ORDER — DOXYCYCLINE 100 MG/1
100 CAPSULE ORAL 2 TIMES DAILY
Qty: 20 CAPSULE | Refills: 0 | Status: SHIPPED | OUTPATIENT
Start: 2020-05-21 | End: 2020-05-31

## 2020-05-21 NOTE — ED NOTES
Pt presents to ER via EMS   For coughing and wheezing x2 weeks    Noted old bruising to face and arms (fell a few weeks ago)   No difficulty breathing on arrival noted   Md at bedside     Will cont to monitor  Pt on monitor

## 2020-05-21 NOTE — ED NOTES
"Pt walked out of roon after removing all monitoring, states " I am ready to go home"   Dr Howard spoke with pt   Waiting plan   "

## 2020-05-21 NOTE — ED PROVIDER NOTES
Encounter Date: 5/21/2020    SCRIBE #1 NOTE: Chantelle PAUL am scribing for, and in the presence of, Chin Howard MD.       History     Chief Complaint   Patient presents with    Wheezing     x 2 weeks     Cough     no fever        Time seen by provider: 2:47 PM on 05/21/2020    Queta Ocampo is a 75 y.o. female with a PMHx of DM-2, HTN and CAD who presents to the ED via EMS with an onset of 2 weeks of wheezing and coughing.  Patient denies fever or chills.  She is complaining of malaise as well.  No muscle aches.  Still has a preserved sense of smell and taste.  Patient reports this feels similar to a previous episode of pneumonia.  Short of breath prior to arrival.  This has resolved with a nebulizer treatment from EMS and supplemental oxygen.  Patient also reports a fall 1 week ago.  Left orbital ecchymosis is resolving.  Also ran out of her Plavix recently and has not refilled this.  No chest pain no chest pressure. The patient denies any other symptoms at this time. PSHx include coronary angiography and left heart catheterization.      The history is provided by the patient and the EMS personnel.     Review of patient's allergies indicates:   Allergen Reactions    Codeine Itching, Swelling, Rash, Other (See Comments) and Nausea And Vomiting     Facial swelling, itching, rash, erythema    Pcn [penicillins] Itching, Swelling, Rash and Other (See Comments)     Facial swelling with rash, erythema, itching     Past Medical History:   Diagnosis Date    Coronary artery disease     Depression     Diabetes mellitus     type 2    Hypertension     Nervous breakdown     Renal disorder     Tachycardia      Past Surgical History:   Procedure Laterality Date    APPENDECTOMY      CARDIAC SURGERY      PCI/stent 2/2020    CORONARY ANGIOGRAPHY N/A 2/14/2020    Procedure: ANGIOGRAM, CORONARY ARTERY;  Surgeon: Edwin Cervantes MD;  [facility] CATH[facility]EP LAB;  Service: Cardiology;  Laterality: N/A;    FOOT  SURGERY      LEFT HEART CATHETERIZATION Left 2020    Procedure: Left heart cath;  Surgeon: Edwin Cervantes MD;  Location: [facility] CATH/EP LAB;  Service: Cardiology;  Laterality: Left;    SHOULDER SURGERY Left     TUBAL LIGATION       Family History   Problem Relation Age of Onset    Hypertension Father     Stroke Father      Social History     Tobacco Use    Smoking status: Former Smoker     Packs/day: 0.50     Years: 30.00     Pack years: 15.00     Types: Cigarettes     Last attempt to quit: 2020     Years since quittin.2    Smokeless tobacco: Never Used   Substance Use Topics    Alcohol use: Not Currently    Drug use: Not Currently     Review of Systems   Constitutional: Negative for fever.   HENT: Negative for sore throat.    Respiratory: Positive for cough and wheezing. Negative for shortness of breath.    Cardiovascular: Negative for chest pain.   Gastrointestinal: Negative for nausea.   Genitourinary: Negative for dysuria.   Musculoskeletal: Negative for back pain.   Skin: Negative for rash.   Neurological: Negative for weakness.   Hematological: Does not bruise/bleed easily.   All other systems reviewed and are negative.      Physical Exam     Initial Vitals [20 1437]   BP Pulse Resp Temp SpO2   132/63 73 18 99.5 °F (37.5 °C) 95 %      MAP       --         Physical Exam    Nursing note and vitals reviewed.  Constitutional: She appears well-developed and well-nourished. She is not diaphoretic. No distress.   Well-appearing no distress, pleasant jovial   HENT:   Head: Normocephalic and atraumatic.   Left periorbital ecchymosis   Eyes: EOM are normal.   Neck: Normal range of motion. Neck supple.   Cardiovascular: Normal rate, regular rhythm and normal heart sounds. Exam reveals no gallop and no friction rub.    No murmur heard.  Pulmonary/Chest: No respiratory distress. She has wheezes. She has no rhonchi. She has no rales.   Faint bilateral wheezing no tachypnea no distress    Abdominal: Soft. She exhibits no distension. There is no tenderness. There is no rebound.   Musculoskeletal: Normal range of motion.   Neurological: She is alert and oriented to person, place, and time.   Skin: Skin is warm and dry.   Psychiatric: She has a normal mood and affect. Her behavior is normal. Judgment and thought content normal.         ED Course   Procedures  Labs Reviewed   CBC W/ AUTO DIFFERENTIAL - Abnormal; Notable for the following components:       Result Value    RBC 3.49 (*)     Hemoglobin 10.6 (*)     Hematocrit 34.1 (*)     Mean Corpuscular Hemoglobin Conc 31.1 (*)     All other components within normal limits   COMPREHENSIVE METABOLIC PANEL - Abnormal; Notable for the following components:    Albumin 3.1 (*)     Alkaline Phosphatase 147 (*)     eGFR if  46 (*)     eGFR if non  40 (*)     All other components within normal limits   SARS-COV-2 RNA AMPLIFICATION, QUAL    Narrative:     What symptom criteria does the patient meet?->Cough  What symptom criteria does the patient meet?->Shortness of  breath or difficulty breathing   TROPONIN I   B-TYPE NATRIURETIC PEPTIDE          Imaging Results          X-Ray Chest AP Portable (SOB) (Final result)  Result time 05/21/20 15:23:27    Final result by Semaj Corley MD (05/21/20 15:23:27)                 Impression:      No definite acute cardiopulmonary disease      Electronically signed by: Semaj Corley MD  Date:    05/21/2020  Time:    15:23             Narrative:    EXAMINATION:  XR CHEST AP PORTABLE    CLINICAL HISTORY:  SOB;    TECHNIQUE:  Single frontal view of the chest was performed.    COMPARISON:  February 26, 2020    FINDINGS:  The lungs appear clear.  The cardiomediastinal silhouette and bony structures are unremarkable.  The aorta is mildly tortuous.  The heart is upper limits of normal in size.                                 Medical Decision Making:   History:   Old Medical Records: I decided to  obtain old medical records.  Independently Interpreted Test(s):   I have ordered and independently interpreted X-rays - see prior notes.  Clinical Tests:   Lab Tests: Ordered and Reviewed  Radiological Study: Ordered and Reviewed            Scribe Attestation:   Scribe #1: I performed the above scribed service and the documentation accurately describes the services I performed. I attest to the accuracy of the note.    I, Dr. Howard, personally performed the services described in this documentation. All medical record entries made by the scribe were at my direction and in my presence.  I have reviewed the chart and agree that the record reflects my personal performance and is accurate and complete.6:52 PM 05/21/2020            ED Course as of May 21 1747   Thu May 21, 2020   1442 BP: 132/63 [EF]   1442 Temp: 99.5 °F (37.5 °C) [EF]   1442 Temp src: Oral [EF]   1442 Pulse: 74 [EF]   1442 Resp: 18 [EF]   1442 SpO2: 97 % [EF]   1530 X-Ray Chest AP Portable (SOB) [EF]   1530 SARS-CoV-2 RNA, Amplification, Qual: Negative [EF]   1545 EKG sinus rhythm 71 beats per minute normal axis no ST segment elevation or depression.  T-wave inversion lead V2.  Independently interpreted.    [EF]   1737 Demanding to leave      [EF]   1738 Advised patient that they need further workup because labs are still pending.  Patient refuses.  Alert and oriented to person place and situation.  I explained the risks of worsening condition, death etc in layman's terms to the patient.  Patient voices these risks back to me.   Patient is not altered and is not under the influence.  Patient is welcome to return to the hospital at any time if they choose to do so.  I will Discharge the patient AGAINST MEDICAL ADVICE.        [EF]   1741 Will not wait any longer, demanding to be DC home    [EF]      ED Course User Index  [EF] Chin Howard MD                Clinical Impression:       ICD-10-CM ICD-9-CM   1. Bronchitis J40 490   2. SOB (shortness of  breath) R06.02 786.05   3. Wheezing R06.2 786.07         Disposition:   Disposition: Discharged  Condition: Stable     ED Disposition Condition    AMA                      75-year-old pleasant female history of CAD diabetes presents for 2 week of coughing and wheezing.  This was bad enough earlier today that an ambulance was called by her family.  She denies any chest pain or chest pressure.  Shortness of breath resolved with an albuterol treatment from EMS and supplemental oxygen.  Asymptomatic currently in the ER except for mild wheeze.  Chest x-ray is normal.  Patient will be started on p.o. antibiotics.  She has an albuterol inhaler at home.  She was not willing to wait for her pending labs which included a troponin and BNP.  She was discharged home against medical advice.  She was well-appearing on discharge with no signs of distress.  I urged her to return should she worsen or change her mind.  She simply did not want to wait any longer for the remaining labs. I think risk of deterioration very small given she was well appearing when left AMA     Chin Howard MD  05/21/20 1205

## 2020-05-21 NOTE — ED NOTES
Pt stable  Escorted by wheelchair to restroom able to stand and move without difficulty     On stretcher waiting results and plan   Will cont to monitor

## 2020-06-16 ENCOUNTER — HOSPITAL ENCOUNTER (OUTPATIENT)
Facility: HOSPITAL | Age: 75
Discharge: HOME | End: 2020-06-17
Attending: EMERGENCY MEDICINE | Admitting: HOSPITALIST
Payer: MEDICARE

## 2020-06-16 DIAGNOSIS — R07.9 CHEST PAIN: Primary | ICD-10-CM

## 2020-06-16 PROBLEM — E78.5 HLD (HYPERLIPIDEMIA): Status: ACTIVE | Noted: 2020-06-16

## 2020-06-16 PROBLEM — R79.89 ELEVATED TROPONIN: Status: ACTIVE | Noted: 2020-06-16

## 2020-06-16 PROBLEM — Z91.148 NONCOMPLIANCE WITH MEDICATION REGIMEN: Status: ACTIVE | Noted: 2020-06-16

## 2020-06-16 PROBLEM — D64.9 NORMOCYTIC ANEMIA: Status: ACTIVE | Noted: 2020-06-16

## 2020-06-16 PROBLEM — Z72.0 TOBACCO ABUSE: Status: ACTIVE | Noted: 2020-06-16

## 2020-06-16 LAB
ANION GAP SERPL CALC-SCNC: 11 MMOL/L (ref 8–16)
BASOPHILS # BLD AUTO: 0.04 K/UL (ref 0–0.2)
BASOPHILS NFR BLD: 0.5 % (ref 0–1.9)
BUN SERPL-MCNC: 26 MG/DL (ref 8–23)
CALCIUM SERPL-MCNC: 8.7 MG/DL (ref 8.7–10.5)
CHLORIDE SERPL-SCNC: 105 MMOL/L (ref 95–110)
CO2 SERPL-SCNC: 23 MMOL/L (ref 23–29)
CREAT SERPL-MCNC: 1.1 MG/DL (ref 0.5–1.4)
DIFFERENTIAL METHOD: ABNORMAL
EOSINOPHIL # BLD AUTO: 0.4 K/UL (ref 0–0.5)
EOSINOPHIL NFR BLD: 5.8 % (ref 0–8)
ERYTHROCYTE [DISTWIDTH] IN BLOOD BY AUTOMATED COUNT: 13.3 % (ref 11.5–14.5)
EST. GFR  (AFRICAN AMERICAN): 57 ML/MIN/1.73 M^2
EST. GFR  (NON AFRICAN AMERICAN): 49 ML/MIN/1.73 M^2
GLUCOSE SERPL-MCNC: 110 MG/DL (ref 70–110)
HCT VFR BLD AUTO: 34.8 % (ref 37–48.5)
HGB BLD-MCNC: 11.1 G/DL (ref 12–16)
IMM GRANULOCYTES # BLD AUTO: 0.04 K/UL (ref 0–0.04)
IMM GRANULOCYTES NFR BLD AUTO: 0.5 % (ref 0–0.5)
INR PPP: 0.9 (ref 0.8–1.2)
LYMPHOCYTES # BLD AUTO: 2.4 K/UL (ref 1–4.8)
LYMPHOCYTES NFR BLD: 31.8 % (ref 18–48)
MCH RBC QN AUTO: 30.6 PG (ref 27–31)
MCHC RBC AUTO-ENTMCNC: 31.9 G/DL (ref 32–36)
MCV RBC AUTO: 96 FL (ref 82–98)
MONOCYTES # BLD AUTO: 0.8 K/UL (ref 0.3–1)
MONOCYTES NFR BLD: 10.6 % (ref 4–15)
NEUTROPHILS # BLD AUTO: 3.8 K/UL (ref 1.8–7.7)
NEUTROPHILS NFR BLD: 50.8 % (ref 38–73)
NRBC BLD-RTO: 0 /100 WBC
PLATELET # BLD AUTO: 209 K/UL (ref 150–350)
PMV BLD AUTO: 10 FL (ref 9.2–12.9)
POTASSIUM SERPL-SCNC: 4.3 MMOL/L (ref 3.5–5.1)
PROTHROMBIN TIME: 10 SEC (ref 9–12.5)
RBC # BLD AUTO: 3.63 M/UL (ref 4–5.4)
SARS-COV-2 RDRP RESP QL NAA+PROBE: NEGATIVE
SODIUM SERPL-SCNC: 139 MMOL/L (ref 136–145)
TROPONIN I SERPL DL<=0.01 NG/ML-MCNC: 0.03 NG/ML (ref 0–0.03)
WBC # BLD AUTO: 7.45 K/UL (ref 3.9–12.7)

## 2020-06-16 PROCEDURE — G0378 HOSPITAL OBSERVATION PER HR: HCPCS

## 2020-06-16 PROCEDURE — 84484 ASSAY OF TROPONIN QUANT: CPT

## 2020-06-16 PROCEDURE — 93005 ELECTROCARDIOGRAM TRACING: CPT

## 2020-06-16 PROCEDURE — 99285 EMERGENCY DEPT VISIT HI MDM: CPT | Mod: 25

## 2020-06-16 PROCEDURE — 83880 ASSAY OF NATRIURETIC PEPTIDE: CPT

## 2020-06-16 PROCEDURE — 80048 BASIC METABOLIC PNL TOTAL CA: CPT

## 2020-06-16 PROCEDURE — 25000003 PHARM REV CODE 250: Performed by: NURSE PRACTITIONER

## 2020-06-16 PROCEDURE — 85025 COMPLETE CBC W/AUTO DIFF WBC: CPT

## 2020-06-16 PROCEDURE — U0002 COVID-19 LAB TEST NON-CDC: HCPCS

## 2020-06-16 PROCEDURE — 36415 COLL VENOUS BLD VENIPUNCTURE: CPT

## 2020-06-16 PROCEDURE — 85610 PROTHROMBIN TIME: CPT

## 2020-06-16 RX ORDER — POTASSIUM CHLORIDE 20 MEQ/15ML
40 SOLUTION ORAL
Status: DISCONTINUED | OUTPATIENT
Start: 2020-06-16 | End: 2020-06-17 | Stop reason: HOSPADM

## 2020-06-16 RX ORDER — LANOLIN ALCOHOL/MO/W.PET/CERES
800 CREAM (GRAM) TOPICAL
Status: DISCONTINUED | OUTPATIENT
Start: 2020-06-16 | End: 2020-06-17 | Stop reason: HOSPADM

## 2020-06-16 RX ORDER — GLUCAGON 1 MG
1 KIT INJECTION
Status: DISCONTINUED | OUTPATIENT
Start: 2020-06-16 | End: 2020-06-17 | Stop reason: HOSPADM

## 2020-06-16 RX ORDER — GABAPENTIN 300 MG/1
600 CAPSULE ORAL 3 TIMES DAILY
Status: DISCONTINUED | OUTPATIENT
Start: 2020-06-17 | End: 2020-06-17 | Stop reason: HOSPADM

## 2020-06-16 RX ORDER — ATORVASTATIN CALCIUM 40 MG/1
80 TABLET, FILM COATED ORAL NIGHTLY
Status: DISCONTINUED | OUTPATIENT
Start: 2020-06-16 | End: 2020-06-17 | Stop reason: HOSPADM

## 2020-06-16 RX ORDER — FLUOXETINE HYDROCHLORIDE 20 MG/1
40 CAPSULE ORAL DAILY
Status: DISCONTINUED | OUTPATIENT
Start: 2020-06-17 | End: 2020-06-17 | Stop reason: HOSPADM

## 2020-06-16 RX ORDER — TALC
6 POWDER (GRAM) TOPICAL NIGHTLY PRN
Status: DISCONTINUED | OUTPATIENT
Start: 2020-06-16 | End: 2020-06-17 | Stop reason: HOSPADM

## 2020-06-16 RX ORDER — NAPROXEN SODIUM 220 MG/1
81 TABLET, FILM COATED ORAL DAILY
Status: DISCONTINUED | OUTPATIENT
Start: 2020-06-17 | End: 2020-06-17

## 2020-06-16 RX ORDER — CLOPIDOGREL BISULFATE 75 MG/1
75 TABLET ORAL DAILY
Status: DISCONTINUED | OUTPATIENT
Start: 2020-06-17 | End: 2020-06-17 | Stop reason: HOSPADM

## 2020-06-16 RX ORDER — METOPROLOL TARTRATE 25 MG/1
12.5 TABLET ORAL 2 TIMES DAILY
Status: DISCONTINUED | OUTPATIENT
Start: 2020-06-16 | End: 2020-06-17 | Stop reason: HOSPADM

## 2020-06-16 RX ORDER — IBUPROFEN 200 MG
16 TABLET ORAL
Status: DISCONTINUED | OUTPATIENT
Start: 2020-06-16 | End: 2020-06-17 | Stop reason: HOSPADM

## 2020-06-16 RX ORDER — SODIUM CHLORIDE 0.9 % (FLUSH) 0.9 %
10 SYRINGE (ML) INJECTION
Status: DISCONTINUED | OUTPATIENT
Start: 2020-06-16 | End: 2020-06-17 | Stop reason: HOSPADM

## 2020-06-16 RX ORDER — IBUPROFEN 200 MG
24 TABLET ORAL
Status: DISCONTINUED | OUTPATIENT
Start: 2020-06-16 | End: 2020-06-17 | Stop reason: HOSPADM

## 2020-06-16 RX ORDER — ONDANSETRON 2 MG/ML
4 INJECTION INTRAMUSCULAR; INTRAVENOUS EVERY 6 HOURS PRN
Status: DISCONTINUED | OUTPATIENT
Start: 2020-06-16 | End: 2020-06-17 | Stop reason: HOSPADM

## 2020-06-16 RX ORDER — ACETAMINOPHEN 325 MG/1
650 TABLET ORAL EVERY 4 HOURS PRN
Status: DISCONTINUED | OUTPATIENT
Start: 2020-06-16 | End: 2020-06-17 | Stop reason: HOSPADM

## 2020-06-16 RX ADMIN — METOPROLOL TARTRATE 12.5 MG: 25 TABLET, FILM COATED ORAL at 11:06

## 2020-06-16 RX ADMIN — ATORVASTATIN CALCIUM 80 MG: 40 TABLET, FILM COATED ORAL at 11:06

## 2020-06-16 RX ADMIN — Medication 6 MG: at 11:06

## 2020-06-17 VITALS
HEIGHT: 68 IN | BODY MASS INDEX: 22.81 KG/M2 | RESPIRATION RATE: 18 BRPM | TEMPERATURE: 98 F | OXYGEN SATURATION: 95 % | DIASTOLIC BLOOD PRESSURE: 63 MMHG | HEART RATE: 67 BPM | SYSTOLIC BLOOD PRESSURE: 133 MMHG

## 2020-06-17 LAB
ALBUMIN SERPL BCP-MCNC: 2.8 G/DL (ref 3.5–5.2)
ALP SERPL-CCNC: 99 U/L (ref 55–135)
ALT SERPL W/O P-5'-P-CCNC: 25 U/L (ref 10–44)
ANION GAP SERPL CALC-SCNC: 12 MMOL/L (ref 8–16)
AST SERPL-CCNC: 28 U/L (ref 10–40)
BASOPHILS # BLD AUTO: 0.02 K/UL (ref 0–0.2)
BASOPHILS NFR BLD: 0.3 % (ref 0–1.9)
BILIRUB SERPL-MCNC: 0.1 MG/DL (ref 0.1–1)
BNP SERPL-MCNC: 62 PG/ML (ref 0–99)
BUN SERPL-MCNC: 25 MG/DL (ref 8–23)
CALCIUM SERPL-MCNC: 8.4 MG/DL (ref 8.7–10.5)
CHLORIDE SERPL-SCNC: 106 MMOL/L (ref 95–110)
CO2 SERPL-SCNC: 21 MMOL/L (ref 23–29)
CREAT SERPL-MCNC: 1.2 MG/DL (ref 0.5–1.4)
DIFFERENTIAL METHOD: ABNORMAL
EOSINOPHIL # BLD AUTO: 0.4 K/UL (ref 0–0.5)
EOSINOPHIL NFR BLD: 6.4 % (ref 0–8)
ERYTHROCYTE [DISTWIDTH] IN BLOOD BY AUTOMATED COUNT: 13.3 % (ref 11.5–14.5)
EST. GFR  (AFRICAN AMERICAN): 51 ML/MIN/1.73 M^2
EST. GFR  (NON AFRICAN AMERICAN): 44 ML/MIN/1.73 M^2
GLUCOSE SERPL-MCNC: 194 MG/DL (ref 70–110)
HCT VFR BLD AUTO: 33.2 % (ref 37–48.5)
HGB BLD-MCNC: 10.1 G/DL (ref 12–16)
IMM GRANULOCYTES # BLD AUTO: 0.03 K/UL (ref 0–0.04)
IMM GRANULOCYTES NFR BLD AUTO: 0.5 % (ref 0–0.5)
LYMPHOCYTES # BLD AUTO: 2.4 K/UL (ref 1–4.8)
LYMPHOCYTES NFR BLD: 36.9 % (ref 18–48)
MAGNESIUM SERPL-MCNC: 1.8 MG/DL (ref 1.6–2.6)
MCH RBC QN AUTO: 29.7 PG (ref 27–31)
MCHC RBC AUTO-ENTMCNC: 30.4 G/DL (ref 32–36)
MCV RBC AUTO: 98 FL (ref 82–98)
MONOCYTES # BLD AUTO: 0.6 K/UL (ref 0.3–1)
MONOCYTES NFR BLD: 9.4 % (ref 4–15)
NEUTROPHILS # BLD AUTO: 3 K/UL (ref 1.8–7.7)
NEUTROPHILS NFR BLD: 46.5 % (ref 38–73)
NRBC BLD-RTO: 0 /100 WBC
PLATELET # BLD AUTO: 205 K/UL (ref 150–350)
PMV BLD AUTO: 10.2 FL (ref 9.2–12.9)
POTASSIUM SERPL-SCNC: 3.8 MMOL/L (ref 3.5–5.1)
PROT SERPL-MCNC: 6 G/DL (ref 6–8.4)
RBC # BLD AUTO: 3.4 M/UL (ref 4–5.4)
SODIUM SERPL-SCNC: 139 MMOL/L (ref 136–145)
TROPONIN I SERPL DL<=0.01 NG/ML-MCNC: 0.01 NG/ML (ref 0–0.03)
TROPONIN I SERPL DL<=0.01 NG/ML-MCNC: 0.03 NG/ML (ref 0–0.03)
WBC # BLD AUTO: 6.39 K/UL (ref 3.9–12.7)

## 2020-06-17 PROCEDURE — 36415 COLL VENOUS BLD VENIPUNCTURE: CPT

## 2020-06-17 PROCEDURE — 94761 N-INVAS EAR/PLS OXIMETRY MLT: CPT

## 2020-06-17 PROCEDURE — 84484 ASSAY OF TROPONIN QUANT: CPT

## 2020-06-17 PROCEDURE — 84484 ASSAY OF TROPONIN QUANT: CPT | Mod: 91

## 2020-06-17 PROCEDURE — 83735 ASSAY OF MAGNESIUM: CPT

## 2020-06-17 PROCEDURE — 93005 ELECTROCARDIOGRAM TRACING: CPT

## 2020-06-17 PROCEDURE — G0378 HOSPITAL OBSERVATION PER HR: HCPCS

## 2020-06-17 PROCEDURE — 80053 COMPREHEN METABOLIC PANEL: CPT

## 2020-06-17 PROCEDURE — 25000003 PHARM REV CODE 250: Performed by: NURSE PRACTITIONER

## 2020-06-17 PROCEDURE — 25000003 PHARM REV CODE 250: Performed by: SPECIALIST

## 2020-06-17 PROCEDURE — 85025 COMPLETE CBC W/AUTO DIFF WBC: CPT

## 2020-06-17 RX ORDER — ATORVASTATIN CALCIUM 80 MG/1
80 TABLET, FILM COATED ORAL NIGHTLY
Qty: 30 TABLET | Refills: 1 | Status: SHIPPED | OUTPATIENT
Start: 2020-06-17 | End: 2020-06-17

## 2020-06-17 RX ORDER — METOPROLOL TARTRATE 25 MG/1
12.5 TABLET, FILM COATED ORAL 2 TIMES DAILY
Qty: 30 TABLET | Refills: 0 | Status: SHIPPED | OUTPATIENT
Start: 2020-06-17 | End: 2020-07-17

## 2020-06-17 RX ORDER — NAPROXEN SODIUM 220 MG/1
81 TABLET, FILM COATED ORAL DAILY
Qty: 30 TABLET | Refills: 0 | Status: SHIPPED | OUTPATIENT
Start: 2020-06-17 | End: 2020-07-17

## 2020-06-17 RX ORDER — CLOPIDOGREL BISULFATE 75 MG/1
75 TABLET ORAL DAILY
Qty: 30 TABLET | Refills: 1 | Status: SHIPPED | OUTPATIENT
Start: 2020-06-17 | End: 2020-07-17

## 2020-06-17 RX ORDER — NAPROXEN SODIUM 220 MG/1
81 TABLET, FILM COATED ORAL DAILY
Status: DISCONTINUED | OUTPATIENT
Start: 2020-06-17 | End: 2020-06-17 | Stop reason: HOSPADM

## 2020-06-17 RX ORDER — CLOPIDOGREL BISULFATE 75 MG/1
150 TABLET ORAL ONCE
Status: COMPLETED | OUTPATIENT
Start: 2020-06-17 | End: 2020-06-17

## 2020-06-17 RX ORDER — CLOPIDOGREL BISULFATE 75 MG/1
75 TABLET ORAL DAILY
Qty: 30 TABLET | Refills: 1 | Status: SHIPPED | OUTPATIENT
Start: 2020-06-17 | End: 2020-06-17

## 2020-06-17 RX ORDER — ATORVASTATIN CALCIUM 80 MG/1
80 TABLET, FILM COATED ORAL NIGHTLY
Qty: 30 TABLET | Refills: 1 | Status: SHIPPED | OUTPATIENT
Start: 2020-06-17 | End: 2020-07-17

## 2020-06-17 RX ADMIN — FLUOXETINE HYDROCHLORIDE 40 MG: 20 CAPSULE ORAL at 08:06

## 2020-06-17 RX ADMIN — GABAPENTIN 600 MG: 300 CAPSULE ORAL at 08:06

## 2020-06-17 RX ADMIN — CLOPIDOGREL BISULFATE 150 MG: 75 TABLET ORAL at 12:06

## 2020-06-17 RX ADMIN — Medication 800 MG: at 08:06

## 2020-06-17 RX ADMIN — ASPIRIN 81 MG 81 MG: 81 TABLET ORAL at 01:06

## 2020-06-17 RX ADMIN — Medication 800 MG: at 12:06

## 2020-06-17 RX ADMIN — CLOPIDOGREL BISULFATE 75 MG: 75 TABLET ORAL at 08:06

## 2020-06-17 NOTE — ASSESSMENT & PLAN NOTE
Patient states the only medicine that she is compliant with our Paxil and Neurontin.  Patient states that she self discontinued all of her cardiac medications.  Patient educated in depth regarding the importance of medication regimen compliance.  Patient verbalizes understanding and acceptance, states that she will resume her home medications.  Will continue home medications upon admission to the hospital.

## 2020-06-17 NOTE — ASSESSMENT & PLAN NOTE
Creatine stable for now. BMP reviewed- noted CrCl cannot be calculated (Unknown ideal weight.). according to latest data. Monitor UOP and serial BMP and adjust therapy as needed. Renally dose meds.

## 2020-06-17 NOTE — ED NOTES
Assumed care:  Queta Ocampo is awake, alert and oriented x 3, skin warm and dry, in NAD.  Patient with history of MI, CO CP that started this afternoon.    Patient identifiers for Queta Ocampo checked and correct.  LOC:  Queta Ocampo is awake, alert, and aware of environment with an appropriate affect. She is oriented x 3 and speaking appropriately.  APPEARANCE:  She is resting comfortably and in no acute distress. She is clean and well groomed, patient's clothing is properly fastened.  SKIN:  The skin is warm and dry. She has normal skin turgor and moist mucus membranes. Skin is intact; no bruising or breakdown noted.  MUSCULOSKELETAL:  She is moving all extremities well, no obvious deformities noted. Pulses intact.   RESPIRATORY:  Airway is open and patent. Respirations are spontaneous and non-labored with normal effort and rate.  SOB  CARDIAC:  She has a normal rate and rhythm. No peripheral edema noted. Capillary refill < 3 seconds.  CP  ABDOMEN:  No distention noted.  Soft and non-tender upon palpation.  NEUROLOGICAL:  PERRL. Facial expression is symmetrical. Hand grasps are equal bilaterally. Normal sensation in all extremities when touched with finger.  Allergies reported:    Review of patient's allergies indicates:   Allergen Reactions    Codeine Itching, Swelling, Rash, Other (See Comments) and Nausea And Vomiting     Facial swelling, itching, rash, erythema    Pcn [penicillins] Itching, Swelling, Rash and Other (See Comments)     Facial swelling with rash, erythema, itching     OTHER NOTES:

## 2020-06-17 NOTE — NURSING
D/C instructions provided and explained to pt, understanding of D/C instructions verbalized. IV removed, catheter intact. Tolerated well. Telemetry monitor removed and returned to monitor room. VSS. Pt denies complaints. Transported off unit via wheelchair.

## 2020-06-17 NOTE — ED PROVIDER NOTES
Encounter Date: 6/16/2020    SCRIBE #1 NOTE: I, Kim Benavidez, am scribing for, and in the presence of, Lorenzo Sheets MD.       History     Chief Complaint   Patient presents with    Chest Pain     with some SOB, started this PM      Time seen by provider: 7:30 PM on 06/16/2020    Queta Ocampo is a 75 y.o. female with PMHx of DM, CAD, HTN, MI 4 months ago, and renal disorder who presents to the ED via EMS with an onset of SOB and CP for 5 hours. The patient describes the CP as waxing and waning sharp pain that only lasts for a few seconds at a time and is unchanged by movement or changing positions. She states that it feels similar to her previous NSTEMI. She also c/o left shoulder pain that accompanied the CP but it has since resolved. The patient states that her symptoms have improved since earlier today. The patient endorses having recent congestion but denies fever, cough, nausea, vomiting, diarrhea, burning urination, weakness, abdominal pain, or any other symptoms at this time. Pertinent PSHx includes appendectomy, left shoulder surgery, cardiac stent insertion 4 months ago, and left heart cath. Known drug allergies include codeine and penicillins.      The history is provided by the patient and the EMS personnel.     Review of patient's allergies indicates:   Allergen Reactions    Codeine Itching, Swelling, Rash, Other (See Comments) and Nausea And Vomiting     Facial swelling, itching, rash, erythema    Pcn [penicillins] Itching, Swelling, Rash and Other (See Comments)     Facial swelling with rash, erythema, itching     Past Medical History:   Diagnosis Date    Coronary artery disease     Depression     Diabetes mellitus     type 2    Hypertension     MI (myocardial infarction)     Nervous breakdown     Renal disorder     Tachycardia      Past Surgical History:   Procedure Laterality Date    APPENDECTOMY      CARDIAC SURGERY      PCI/stent 2/2020    CORONARY ANGIOGRAPHY N/A 2/14/2020     Procedure: ANGIOGRAM, CORONARY ARTERY;  Surgeon: Edwin Cervantes MD;  Location: [facility] CATH[facility]EP LAB;  Service: Cardiology;  Laterality: N/A;    FOOT SURGERY      LEFT HEART CATHETERIZATION Left 2020    Procedure: Left heart cath;  Surgeon: Edwin Cervantes MD;  Location: [facility] CATH/EP LAB;  Service: Cardiology;  Laterality: Left;    SHOULDER SURGERY Left     TUBAL LIGATION       Family History   Problem Relation Age of Onset    Hypertension Father     Stroke Father      Social History     Tobacco Use    Smoking status: Former Smoker     Packs/day: 0.50     Years: 30.00     Pack years: 15.00     Types: Cigarettes     Quit date: 2020     Years since quittin.3    Smokeless tobacco: Never Used   Substance Use Topics    Alcohol use: Not Currently    Drug use: Not Currently     Review of Systems   Constitutional: Negative for fever.   HENT: Positive for congestion. Negative for sore throat.    Respiratory: Positive for shortness of breath. Negative for cough.    Cardiovascular: Positive for chest pain.   Gastrointestinal: Negative for abdominal pain, diarrhea, nausea and vomiting.   Genitourinary: Negative for dysuria.   Musculoskeletal: Positive for arthralgias (left shoulder - resolved). Negative for back pain.   Skin: Negative for rash.   Neurological: Negative for weakness.   Hematological: Does not bruise/bleed easily.       Physical Exam     Initial Vitals [20]   BP Pulse Resp Temp SpO2   (!) 141/83 64 20 98.2 °F (36.8 °C) 97 %      MAP       --         Physical Exam    Nursing note and vitals reviewed.  Constitutional: She appears well-developed and well-nourished. She is not diaphoretic. No distress.   HENT:   Head: Normocephalic and atraumatic.   Mouth/Throat: Oropharynx is clear and moist.   Eyes: Conjunctivae are normal.   Neck: Neck supple.   Cardiovascular: Normal rate, regular rhythm and intact distal pulses. Exam reveals no gallop and no friction rub.    Murmur  heard.   Systolic murmur is present with a grade of 3/6.  Pulses:       Dorsalis pedis pulses are 2+ on the right side and 2+ on the left side.        Posterior tibial pulses are 2+ on the right side and 2+ on the left side.   Pulmonary/Chest: No tachypnea. She has no wheezes. She has no rhonchi. She has rales (scant at left base).   Abdominal: Soft. She exhibits no distension. There is no abdominal tenderness.   Musculoskeletal: Normal range of motion. No edema.   Neurological: She is alert and oriented to person, place, and time.   Skin: No rash noted. No erythema.   Psychiatric: Her speech is normal.         ED Course   Procedures  Labs Reviewed   CBC W/ AUTO DIFFERENTIAL - Abnormal; Notable for the following components:       Result Value    RBC 3.63 (*)     Hemoglobin 11.1 (*)     Hematocrit 34.8 (*)     Mean Corpuscular Hemoglobin Conc 31.9 (*)     All other components within normal limits   BASIC METABOLIC PANEL - Abnormal; Notable for the following components:    BUN, Bld 26 (*)     eGFR if  57 (*)     eGFR if non  49 (*)     All other components within normal limits   TROPONIN I - Abnormal; Notable for the following components:    Troponin I 0.027 (*)     All other components within normal limits   SARS-COV-2 RNA AMPLIFICATION, QUAL          Imaging Results          X-Ray Chest 1 View (Final result)  Result time 06/16/20 21:16:13    Final result by Merritt Das MD (06/16/20 21:16:13)                 Impression:      No acute abnormality.      Electronically signed by: Merritt Das  Date:    06/16/2020  Time:    21:16             Narrative:    EXAMINATION:  XR CHEST 1 VIEW    CLINICAL HISTORY:  SOB;    TECHNIQUE:  Single frontal view of the chest was performed.    COMPARISON:  05/21/2020    FINDINGS:  Surgical fixation hardware in the proximal left humerus.    The lungs are clear, with normal appearance of pulmonary vasculature and no pleural effusion or  pneumothorax.    The cardiac silhouette is normal in size. The hilar and mediastinal contours are unremarkable.    Bones are intact.                                 Medical Decision Making:   History:   Old Medical Records: I decided to obtain old medical records.  Clinical Tests:   Lab Tests: Ordered and Reviewed  Radiological Study: Ordered and Reviewed  Medical Tests: Ordered and Reviewed            Scribe Attestation:   Scribe #1: I performed the above scribed service and the documentation accurately describes the services I performed. I attest to the accuracy of the note.    I, Dr. Lorenzo Sheets, personally performed the services described in this documentation. All medical record entries made by the scribe were at my direction and in my presence.  I have reviewed the chart and agree that the record reflects my personal performance and is accurate and complete. Lorenzo Sheets MD.  2:40 AM 06/17/2020    Queta Ocampo is a 75 y.o. female presenting with chest pain in this intermediate risk patient by HEART score.  Initial workup for consideration of ACS shows only minimally positive troponin to be trended.  I do not think she requires emergent cardiac catheterization.  She has no acute ischemic changes on EKG.  I have low suspicion for aortic dissection or PE and do not think D-dimer CT angiography are indicated.  I have discussed with hospital medicine who has assumed care.Dictation #1  MRN:[id number]  CSN:[id number]            ED Course as of Jun 17 0241 Tue Jun 16, 2020 2003 EKG:  NSR, rate of 63, normal intervals and axis.  Old nonspecific T-wave changes in aVL and precordial leads compared to last prior. There are no acute ST or T wave changes suggestive of acute ischemia or infarction.      [MR]   2028 CXR:  NAD compared to prior. (my read)    [MR]      ED Course User Index  [MR] Lorenzo Sheets MD                Clinical Impression:       ICD-10-CM ICD-9-CM   1. Chest pain  R07.9 786.50          Disposition:   Disposition: Admitted     ED Disposition Condition    Observation                           Lorenzo Sheets MD  06/17/20 0243

## 2020-06-17 NOTE — PLAN OF CARE
CM attempted to schedule hospital follow up with PCP. No answer at office. CM will try again at later time.    CM also attempted to schedule follow up with Dr. Cervantes. Per Lachelle (receptonist), Dr. Cervantes is booked. Stated she will send message to his nurse and they will contact pt to schedule.

## 2020-06-17 NOTE — NURSING
Spoke with Dr Stephen, cardiologist on for today to notify him of consult. He ordered to obtain EKG for today. Pt has no active chest pain today or issue at this time. Dr Stephen to be by to see pt today.

## 2020-06-17 NOTE — SUBJECTIVE & OBJECTIVE
Past Medical History:   Diagnosis Date    Coronary artery disease     Depression     Diabetes mellitus     type 2    Hypertension     MI (myocardial infarction)     Nervous breakdown     Renal disorder     Tachycardia        Past Surgical History:   Procedure Laterality Date    APPENDECTOMY      CARDIAC SURGERY      PCI/stent 2/2020    CORONARY ANGIOGRAPHY N/A 2/14/2020    Procedure: ANGIOGRAM, CORONARY ARTERY;  Surgeon: Edwin Cervantes MD;  Location: [facility] CATH/EP LAB;  Service: Cardiology;  Laterality: N/A;    FOOT SURGERY      LEFT HEART CATHETERIZATION Left 2/14/2020    Procedure: Left heart cath;  Surgeon: Edwin Cervantes MD;  Location: [facility] CATH/EP LAB;  Service: Cardiology;  Laterality: Left;    SHOULDER SURGERY Left     TUBAL LIGATION         Review of patient's allergies indicates:   Allergen Reactions    Codeine Itching, Swelling, Rash, Other (See Comments) and Nausea And Vomiting     Facial swelling, itching, rash, erythema    Pcn [penicillins] Itching, Swelling, Rash and Other (See Comments)     Facial swelling with rash, erythema, itching       No current facility-administered medications on file prior to encounter.      Current Outpatient Medications on File Prior to Encounter   Medication Sig    aspirin 81 MG Chew Take 1 tablet (81 mg total) by mouth once daily.    atorvastatin (LIPITOR) 80 MG tablet Take 1 tablet (80 mg total) by mouth every evening.    clopidogreL (PLAVIX) 75 mg tablet Take 1 tablet (75 mg total) by mouth once daily.    FLUoxetine 40 MG capsule Take 40 mg by mouth once daily.     gabapentin (NEURONTIN) 600 MG tablet Take 600 mg by mouth 3 (three) times daily.    metoprolol tartrate (LOPRESSOR) 25 MG tablet Take 0.5 tablets (12.5 mg total) by mouth 2 (two) times daily.    ondansetron (ZOFRAN-ODT) 8 MG TbDL Take 1 tablet (8 mg total) by mouth every 8 (eight) hours as needed.     Family History     Problem Relation (Age of Onset)    Hypertension Father     Stroke Father        Tobacco Use    Smoking status: Former Smoker     Packs/day: 0.50     Years: 30.00     Pack years: 15.00     Types: Cigarettes     Quit date: 2020     Years since quittin.3    Smokeless tobacco: Never Used   Substance and Sexual Activity    Alcohol use: Not Currently    Drug use: Not Currently    Sexual activity: Not on file     Review of Systems   Constitutional: Negative for activity change, appetite change, chills, fatigue and fever.   HENT: Negative for congestion, sinus pressure, sinus pain and sore throat.    Eyes: Negative for photophobia and visual disturbance.   Respiratory: Positive for shortness of breath. Negative for cough, choking, chest tightness and wheezing.    Cardiovascular: Positive for chest pain. Negative for palpitations and leg swelling.   Gastrointestinal: Negative for abdominal distention, abdominal pain, blood in stool, constipation, diarrhea, nausea and vomiting.   Genitourinary: Negative for difficulty urinating, dysuria, flank pain and hematuria.   Musculoskeletal: Negative for back pain, gait problem and joint swelling.   Skin: Negative for rash and wound.   Neurological: Positive for weakness. Negative for dizziness, syncope, light-headedness, numbness and headaches.   Psychiatric/Behavioral: Negative for confusion. The patient is not nervous/anxious.      Objective:     Vital Signs (Most Recent):  Temp: 98.2 °F (36.8 °C) (20)  Pulse: 61 (20)  Resp: 20 (20)  BP: (!) 154/65 (20)  SpO2: 96 % (20) Vital Signs (24h Range):  Temp:  [98.2 °F (36.8 °C)] 98.2 °F (36.8 °C)  Pulse:  [60-64] 61  Resp:  [20] 20  SpO2:  [94 %-97 %] 96 %  BP: (101-154)/(49-83) 154/65        Body mass index is 22.81 kg/m².    Physical Exam  Vitals signs and nursing note reviewed.   Constitutional:       General: She is not in acute distress.     Appearance: She is well-developed. She is not diaphoretic.      Comments:  Chronically ill-appearing, elderly.   HENT:      Head: Normocephalic and atraumatic.   Eyes:      General:         Right eye: No discharge.         Left eye: No discharge.      Pupils: Pupils are equal, round, and reactive to light.      Comments: Eyeglasses in use   Neck:      Musculoskeletal: Normal range of motion.      Vascular: No JVD.   Cardiovascular:      Rate and Rhythm: Normal rate and regular rhythm.      Heart sounds: Normal heart sounds. No murmur.   Pulmonary:      Effort: Pulmonary effort is normal. No respiratory distress.      Breath sounds: Normal breath sounds. No wheezing or rales.      Comments: Patient on room air during my initial interview, in no acute respiratory distress.  Chest:      Chest wall: No tenderness.   Abdominal:      General: Bowel sounds are normal. There is no distension.      Palpations: Abdomen is soft.      Tenderness: There is no abdominal tenderness. There is no guarding or rebound.   Genitourinary:     Comments: Exam Deferred     Musculoskeletal: Normal range of motion.         General: No tenderness or deformity.   Skin:     General: Skin is warm and dry.      Capillary Refill: Capillary refill takes 2 to 3 seconds.      Findings: No erythema or rash.      Comments: Generalized bruising noted throughout bilateral upper extremities   Neurological:      Mental Status: She is alert and oriented to person, place, and time.      Cranial Nerves: No cranial nerve deficit.      Sensory: No sensory deficit.   Psychiatric:         Behavior: Behavior normal.         Thought Content: Thought content normal.         Judgment: Judgment normal.           CRANIAL NERVES     CN III, IV, VI   Pupils are equal, round, and reactive to light.       Significant Labs:   CBC:   Recent Labs   Lab 06/16/20 2011   WBC 7.45   HGB 11.1*   HCT 34.8*        CMP:   Recent Labs   Lab 06/16/20 2011      K 4.3      CO2 23      BUN 26*   CREATININE 1.1   CALCIUM 8.7   ANIONGAP  11   EGFRNONAA 49*     Troponin:   Recent Labs   Lab 06/16/20 2011   TROPONINI 0.027*     COVID - Negative     All pertinent labs within the past 24 hours have been reviewed.    Significant Imaging: I have reviewed all pertinent imaging results/findings within the past 24 hours.     Chest Xray:  Impression:       No acute abnormality.      EKG performed on 6/16/2020, impression as below:  Normal sinus rhythm  Nonspecific T wave abnormality  Abnormal ECG  When compared with ECG of 21-MAY-2020 15:43,  No significant change was found

## 2020-06-17 NOTE — PLAN OF CARE
Pt clear for discharge from case management standpoint. Pt discharging to home.        06/17/20 1201   Final Note   Assessment Type Final Discharge Note   Anticipated Discharge Disposition Home   Hospital Follow Up  Appt(s) scheduled? Yes

## 2020-06-17 NOTE — CONSULTS
[facility]  Cardiology  Consult Note    Patient Name: Queta Ocampo  MRN: [id number]  Admission Date: 6/16/2020  Hospital Length of Stay: 0 days  Code Status: Full Code   Attending Provider: Brayan Sam MD   Consulting Provider: Endy Stephen MD  Primary Care Physician: Jean Alexander MD  Principal Problem:Chest pain    Patient information was obtained from patient and ER records.     Consults  Subjective:     Chief Complaint:  Chest pain     HPI:  Of trisha randhawa had chest pain the day of admission   stenting of the right coronary artery 2020 she stopped her Plavix and cholesterol medicines possibly a month ago and she continues to smoke half pack to a pack a cigarettes a day.  She has been a heavy cigarette smoker since teenage years  She has some claudication in the lower extremities    Past Medical History:   Diagnosis Date    Coronary artery disease     Depression     Diabetes mellitus     type 2    Hypertension     MI (myocardial infarction)     Nervous breakdown     Renal disorder     Tachycardia        Past Surgical History:   Procedure Laterality Date    APPENDECTOMY      CARDIAC SURGERY      PCI/stent 2/2020    CORONARY ANGIOGRAPHY N/A 2/14/2020    Procedure: ANGIOGRAM, CORONARY ARTERY;  Surgeon: Edwin Cervantes MD;  Location: [facility] CATH/EP LAB;  Service: Cardiology;  Laterality: N/A;    FOOT SURGERY      LEFT HEART CATHETERIZATION Left 2/14/2020    Procedure: Left heart cath;  Surgeon: Edwin Cervantes MD;  Location: [facility] CATH/EP LAB;  Service: Cardiology;  Laterality: Left;    SHOULDER SURGERY Left     TUBAL LIGATION         Review of patient's allergies indicates:   Allergen Reactions    Codeine Itching, Swelling, Rash, Other (See Comments) and Nausea And Vomiting     Facial swelling, itching, rash, erythema    Pcn [penicillins] Itching, Swelling, Rash and Other (See Comments)     Facial swelling with rash, erythema, itching       No current  facility-administered medications on file prior to encounter.      Current Outpatient Medications on File Prior to Encounter   Medication Sig    aspirin 81 MG Chew Take 1 tablet (81 mg total) by mouth once daily.    atorvastatin (LIPITOR) 80 MG tablet Take 1 tablet (80 mg total) by mouth every evening.    clopidogreL (PLAVIX) 75 mg tablet Take 1 tablet (75 mg total) by mouth once daily.    FLUoxetine 40 MG capsule Take 40 mg by mouth once daily.     gabapentin (NEURONTIN) 600 MG tablet Take 600 mg by mouth 3 (three) times daily.    metoprolol tartrate (LOPRESSOR) 25 MG tablet Take 0.5 tablets (12.5 mg total) by mouth 2 (two) times daily.    ondansetron (ZOFRAN-ODT) 8 MG TbDL Take 1 tablet (8 mg total) by mouth every 8 (eight) hours as needed.     Family History     Problem Relation (Age of Onset)    Hypertension Father    Stroke Father        Tobacco Use    Smoking status: Former Smoker     Packs/day: 0.50     Years: 30.00     Pack years: 15.00     Types: Cigarettes     Quit date: 2020     Years since quittin.3    Smokeless tobacco: Never Used   Substance and Sexual Activity    Alcohol use: Not Currently    Drug use: Not Currently    Sexual activity: Not on file     ROS  Objective:     Vital Signs (Most Recent):  Temp: 97.6 °F (36.4 °C) (20 1100)  Pulse: 67 (20 1100)  Resp: 18 (20 1100)  BP: 133/63 (20 1100)  SpO2: 95 % (20 1100) Vital Signs (24h Range):  Temp:  [97.4 °F (36.3 °C)-98.2 °F (36.8 °C)] 97.6 °F (36.4 °C)  Pulse:  [60-67] 67  Resp:  [16-20] 18  SpO2:  [94 %-97 %] 95 %  BP: (101-187)/(49-83) 133/63        Body mass index is 22.81 kg/m².    SpO2: 95 %  O2 Device (Oxygen Therapy): room air      Intake/Output Summary (Last 24 hours) at 2020 1222  Last data filed at 2020 1200  Gross per 24 hour   Intake 120 ml   Output --   Net 120 ml       Lines/Drains/Airways     Peripheral Intravenous Line                 Peripheral IV - Single Lumen 20 20  G Right Wrist 1 day                Physical Exam 110/80  Head neck no bruit  Lungs bilateral wheezes decreased breath sounds  Cardiac sounds normal  Abdomen okay  Of extremities decreased dorsalis pedis on the left +2 posterior tibial bilaterally  Slight decreased iliac pulsations      Significant Labs:   CMP   Recent Labs   Lab 06/16/20 2011 06/17/20  0054    139   K 4.3 3.8    106   CO2 23 21*    194*   BUN 26* 25*   CREATININE 1.1 1.2   CALCIUM 8.7 8.4*   PROT  --  6.0   ALBUMIN  --  2.8*   BILITOT  --  0.1   ALKPHOS  --  99   AST  --  28   ALT  --  25   ANIONGAP 11 12   ESTGFRAFRICA 57* 51*   EGFRNONAA 49* 44*   , CBC   Recent Labs   Lab 06/16/20 2011 06/17/20 0054   WBC 7.45 6.39   HGB 11.1* 10.1*   HCT 34.8* 33.2*    205    and Troponin   Recent Labs   Lab 06/16/20 2011 06/17/20 0054 06/17/20  0748   TROPONINI 0.027* 0.013 0.025        Significant Imaging: EKG: Sinus rhythm EKG is normal no ST-T changes  Assessment and Plan:   ASCVD an angina possibly due to clot within the right coronary stent although no acute ST-T changes is seen electrocardiographically and cardiac enzymes are normal  Recommend-I spent 15 min talking to the patient about the importance of taking her Plavix and cholesterol medicine aspirin  She understands she needs to discontinue smoking cigarettes  She does have some claudication type symptoms  She has significant COPD  Follow-up in office or Dr. Cervantes in several weeks  Sent home with nitroglycerin as needed  She may need stress testing as an outpatient  Active Diagnoses:    Diagnosis Date Noted POA    PRINCIPAL PROBLEM:  Chest pain [R07.9] 06/16/2020 Yes    Elevated troponin [R79.89] 06/16/2020 Yes    Normocytic anemia [D64.9] 06/16/2020 Yes    Noncompliance with medication regimen [Z91.14] 06/16/2020 Not Applicable    Tobacco abuse [Z72.0] 06/16/2020 Yes    HLD (hyperlipidemia) [E78.5] 06/16/2020 Yes    Chronic kidney disease, stage III  (moderate) [N18.3] 02/26/2020 Yes    HTN (hypertension) [I10] 02/14/2020 Yes    Depression [F32.9] 04/09/2019 Yes      Problems Resolved During this Admission:   I personally reviewed chart and imaging studies ,examined patient, and discussed with the patient her illness and treatment including diet and follow-up care. This consult was prolonged and required approximately 50% time for review and 50% time examining patient and writing notes and orders       VTE Risk Mitigation (From admission, onward)         Ordered     IP VTE HIGH RISK PATIENT  Once      06/16/20 2253     Place sequential compression device  Until discontinued      06/16/20 2253     Place CESAR hose  Until discontinued      06/16/20 2253                Thank you for your consult.     Endy Stephen MD  Cardiology   [facility]

## 2020-06-17 NOTE — HPI
Queta Ocampo is a 75-year-old female with a past medical history of coronary artery disease, depression, hypertension, and status post coronary stent placement from MI in February 2020 who presents to the ER [facility] with reports of CP. Patient states since her MI she experiences intermittent CP, however this AM she began to develop CP that was increased in severity.  Patient states the pain was located to her left anterior chest wall, radiated to her left shoulder, sharp in characteristic.  Patient reports that the pain spontaneously resolved, states she did not take any over-the-counter medication or nitroglycerin prior to arrival to emergency room.  Patient also endorses shortness of breath that developed this morning in association with the chest pain.  Patient underwent a cardiac catheterization in February 2020 per Dr. Harmon, requiring stent placement.  Patient states approximately 2 months ago she stopped taking all of her medications except for her Prozac and gabapentin.  When asked the reason behind her noncompliance with medication regimen patient states I just did not want to take them.  Patient states however she did take 1 dose of metoprolol today when her chest pain increased in severity.  In the emergency room patient noted to have mildly elevated troponin.  Patient placed in observation on 06/16/2020 at approximately 10:30 p.m..  Patient states that she resides at home with her grandson, denies use of supplemental oxygen or ambulatory assist devices.  Patient endorses smoking 1 pack of tobacco daily.

## 2020-06-17 NOTE — NURSING
Dr Stephen on unit and met with pt at bedside. Dr Stephen said pt is cleared for discharge from cardiac standpoint. He said to administer a loading dose of Plavix 150mg po prior to pt discharge. Also stated to make sure that the prescriptions for Lipitor, baby aspirin, and Plavix are reordered/sent to pt's pharmacy and to Reiterate the importance that pt adhere to prescribed medication regimen after discharge. Suzanna Garcia NP who said okay to proceed with discharging pt and she has sent over the pt's meds to her pharmacy

## 2020-06-17 NOTE — RESPIRATORY THERAPY
06/17/20 0130   Patient Assessment/Suction   Level of Consciousness (AVPU) alert   Respiratory Effort Normal;Unlabored   Expansion/Accessory Muscles/Retractions no use of accessory muscles;no retractions;expansion symmetric   All Lung Fields Breath Sounds clear;diminished   Rhythm/Pattern, Respiratory unlabored   PRE-TX-O2   O2 Device (Oxygen Therapy) room air   SpO2 (!) 94 %   Pulse 62   Resp 16

## 2020-06-17 NOTE — PLAN OF CARE
"CM completed discharge assessment bedside with pt and pt's grandson. Pt verified information on facesheet as correct. Reports her granddaughter, Annia, as POA. Reports living at listed address alone. Reports having help from family when needed. PCP is Dr. Alexander. Pharm is [facility] on [address]. Denies hh/hd. DME- rollator (uses PR[employer]), sc. Reports being independent with ADLs, does not drive. Reports that her family provides transportation for her and will provide transportation home upon DC. Verified insurance as medicare a/b and [employer] for life. Denies recent inpt stay in last 30 days. DC plan is home. CM following.   CM offered tobacco cessation recourses for pt. Pt declined. Stated "I made it 75 and it hasn't killed me yet". Resources attached to AVS.        06/17/20 1030   Discharge Assessment   Assessment Type Discharge Planning Assessment   Confirmed/corrected address and phone number on facesheet? Yes   Assessment information obtained from? Patient   Communicated expected length of stay with patient/caregiver yes   Prior to hospitilization cognitive status: Alert/Oriented   Prior to hospitalization functional status: Independent   Current cognitive status: Alert/Oriented   Current Functional Status: Independent   Lives With alone   Able to Return to Prior Arrangements yes   Is patient able to care for self after discharge? Yes   Patient's perception of discharge disposition home or selfcare   Readmission Within the Last 30 Days no previous admission in last 30 days   Patient currently being followed by outpatient case management? No   Patient currently receives any other outside agency services? No   Equipment Currently Used at Home rollator;shower chair   Do you have any problems affording any of your prescribed medications? No   Is the patient taking medications as prescribed? no   If no, which medications is patient not taking? plavix   Does the patient have transportation home? Yes   Transportation " Anticipated family or friend will provide   Does the patient receive services at the [facility]? No   Discharge Plan A Home   Discharge Plan B Home   DME Needed Upon Discharge  none   Patient/Family in Agreement with Plan yes

## 2020-06-17 NOTE — RESPIRATORY THERAPY
06/17/20 0800   PRE-TX-O2   O2 Device (Oxygen Therapy) room air   SpO2 95 %   Pulse Oximetry Type Intermittent   $ Pulse Oximetry - Multiple Charge Pulse Oximetry - Multiple

## 2020-06-17 NOTE — ASSESSMENT & PLAN NOTE
Patient denies CP or SOB upon admission to the hospital. Initial troponin elevated, will trend. Continuous telemetry monitoring. HEART score 6 upon admission. Cardiology consulted.      Results for orders placed during the hospital encounter of 02/14/20   Echo Color Flow Doppler? Yes    Narrative · Concentric left ventricular remodeling.  · Normal left ventricular systolic function. The estimated ejection   fraction is 60%.  · Grade I (mild) left ventricular diastolic dysfunction consistent with   impaired relaxation.  · Normal right ventricular systolic function.  · Mild aortic valve stenosis.  · Aortic valve area is 1.35 cm2; peak velocity is 2.36 m/s; mean gradient   is 12 mmHg.  · There is moderate leaflet calcification of the Mitral Valve.  · Mild mitral regurgitation.

## 2020-06-17 NOTE — UM SECONDARY REVIEW
Physician Advisor External    Level of Care Issue    0805  Sent to EHR for review of 06/16    EHR determination is outpatient for 06/16

## 2020-06-17 NOTE — ASSESSMENT & PLAN NOTE
Chronic, controlled.  Latest blood pressure and vitals reviewed-   Temp:  [98.2 °F (36.8 °C)]   Pulse:  [60-64]   Resp:  [18-20]   BP: (101-187)/(49-83)   SpO2:  [94 %-97 %] .   Home meds for hypertension were reviewed and noted below. Hospital anti-hypertensive changes were made as shown below.  Hypertension Medications             metoprolol tartrate (LOPRESSOR) 25 MG tablet Take 0.5 tablets (12.5 mg total) by mouth 2 (two) times daily.      Hospital Medications             metoprolol tartrate (LOPRESSOR) split tablet 12.5 mg 12.5 mg, Oral, 2 times daily        Will treat with PRN antihypertensives, as needed, to maintain BP less than 180/110 or if patient becomes symptomatic.

## 2020-06-17 NOTE — ASSESSMENT & PLAN NOTE
Assistance with smoking cessation was offered, including:  []  Medications  [x]  Counseling  []  Printed Information on Smoking Cessation  []  Referral to a Smoking Cessation Program    Patient was counseled regarding smoking for >10 minutes.

## 2020-06-18 ENCOUNTER — TELEPHONE (OUTPATIENT)
Dept: MEDSURG UNIT | Facility: HOSPITAL | Age: 75
End: 2020-06-18

## 2020-06-18 NOTE — HOSPITAL COURSE
Patient monitor closely during observation stay.  Troponin trended and normalized.  Cardiology consulted for evaluation.  Patient states she had not been taking her Plavix or aspirin after her recent cardiac stent placement.  She was given new prescriptions for her home medications.  She is stable for discharge from a cardiology standpoint with outpatient follow-up.  She denies chest pain since admission.    Chest clear auscultation, heart regular rate and rhythm with systolic murmur

## 2020-08-17 ENCOUNTER — HOSPITAL ENCOUNTER (EMERGENCY)
Facility: HOSPITAL | Age: 75
Discharge: PSYCHIATRIC OR SUBSTANCE USE FACILITY | End: 2020-08-17
Attending: EMERGENCY MEDICINE
Payer: MEDICARE

## 2020-08-17 VITALS
BODY MASS INDEX: 21.98 KG/M2 | SYSTOLIC BLOOD PRESSURE: 141 MMHG | RESPIRATION RATE: 18 BRPM | OXYGEN SATURATION: 98 % | TEMPERATURE: 99 F | HEIGHT: 68 IN | WEIGHT: 145 LBS | DIASTOLIC BLOOD PRESSURE: 61 MMHG | HEART RATE: 59 BPM

## 2020-08-17 DIAGNOSIS — R45.850 HOMICIDAL IDEATION: Primary | ICD-10-CM

## 2020-08-17 DIAGNOSIS — R29.818 NEUROCOGNITIVE DEFICITS: ICD-10-CM

## 2020-08-17 DIAGNOSIS — R41.89 NEUROCOGNITIVE DEFICITS: ICD-10-CM

## 2020-08-17 DIAGNOSIS — Z00.8 MEDICAL CLEARANCE FOR PSYCHIATRIC ADMISSION: ICD-10-CM

## 2020-08-17 DIAGNOSIS — R45.851 SUICIDAL IDEATION: ICD-10-CM

## 2020-08-17 DIAGNOSIS — F33.2 MDD (MAJOR DEPRESSIVE DISORDER), RECURRENT SEVERE, WITHOUT PSYCHOSIS: ICD-10-CM

## 2020-08-17 DIAGNOSIS — F41.1 GAD (GENERALIZED ANXIETY DISORDER): ICD-10-CM

## 2020-08-17 LAB
ALBUMIN SERPL BCP-MCNC: 4.1 G/DL (ref 3.5–5.2)
ALP SERPL-CCNC: 87 U/L (ref 55–135)
ALT SERPL W/O P-5'-P-CCNC: 5 U/L (ref 10–44)
AMPHET+METHAMPHET UR QL: NEGATIVE
ANION GAP SERPL CALC-SCNC: 14 MMOL/L (ref 8–16)
APAP SERPL-MCNC: <3 UG/ML (ref 10–20)
AST SERPL-CCNC: 17 U/L (ref 10–40)
BACTERIA #/AREA URNS HPF: ABNORMAL /HPF
BARBITURATES UR QL SCN>200 NG/ML: NEGATIVE
BASOPHILS # BLD AUTO: 0.04 K/UL (ref 0–0.2)
BASOPHILS NFR BLD: 0.3 % (ref 0–1.9)
BENZODIAZ UR QL SCN>200 NG/ML: NORMAL
BILIRUB SERPL-MCNC: 0.3 MG/DL (ref 0.1–1)
BILIRUB UR QL STRIP: NEGATIVE
BUN SERPL-MCNC: 29 MG/DL (ref 8–23)
BZE UR QL SCN: NEGATIVE
CALCIUM SERPL-MCNC: 9.5 MG/DL (ref 8.7–10.5)
CANNABINOIDS UR QL SCN: NORMAL
CHLORIDE SERPL-SCNC: 102 MMOL/L (ref 95–110)
CLARITY UR: CLEAR
CO2 SERPL-SCNC: 23 MMOL/L (ref 23–29)
COLOR UR: YELLOW
CREAT SERPL-MCNC: 1.5 MG/DL (ref 0.5–1.4)
CREAT UR-MCNC: 158.5 MG/DL (ref 15–325)
DIFFERENTIAL METHOD: ABNORMAL
EOSINOPHIL # BLD AUTO: 0.1 K/UL (ref 0–0.5)
EOSINOPHIL NFR BLD: 0.4 % (ref 0–8)
ERYTHROCYTE [DISTWIDTH] IN BLOOD BY AUTOMATED COUNT: 13.6 % (ref 11.5–14.5)
EST. GFR  (AFRICAN AMERICAN): 39 ML/MIN/1.73 M^2
EST. GFR  (NON AFRICAN AMERICAN): 34 ML/MIN/1.73 M^2
ETHANOL SERPL-MCNC: <10 MG/DL
GLUCOSE SERPL-MCNC: 95 MG/DL (ref 70–110)
GLUCOSE UR QL STRIP: NEGATIVE
HCT VFR BLD AUTO: 40.6 % (ref 37–48.5)
HGB BLD-MCNC: 12.7 G/DL (ref 12–16)
HGB UR QL STRIP: ABNORMAL
HYALINE CASTS #/AREA URNS LPF: 13 /LPF
IMM GRANULOCYTES # BLD AUTO: 0.04 K/UL (ref 0–0.04)
IMM GRANULOCYTES NFR BLD AUTO: 0.3 % (ref 0–0.5)
KETONES UR QL STRIP: NEGATIVE
LEUKOCYTE ESTERASE UR QL STRIP: ABNORMAL
LYMPHOCYTES # BLD AUTO: 3.1 K/UL (ref 1–4.8)
LYMPHOCYTES NFR BLD: 24.4 % (ref 18–48)
MCH RBC QN AUTO: 30.8 PG (ref 27–31)
MCHC RBC AUTO-ENTMCNC: 31.3 G/DL (ref 32–36)
MCV RBC AUTO: 99 FL (ref 82–98)
METHADONE UR QL SCN>300 NG/ML: NEGATIVE
MICROSCOPIC COMMENT: ABNORMAL
MONOCYTES # BLD AUTO: 1 K/UL (ref 0.3–1)
MONOCYTES NFR BLD: 7.8 % (ref 4–15)
NEUTROPHILS # BLD AUTO: 8.5 K/UL (ref 1.8–7.7)
NEUTROPHILS NFR BLD: 66.8 % (ref 38–73)
NITRITE UR QL STRIP: NEGATIVE
NRBC BLD-RTO: 0 /100 WBC
OPIATES UR QL SCN: NEGATIVE
PCP UR QL SCN>25 NG/ML: NEGATIVE
PH UR STRIP: 6 [PH] (ref 5–8)
PLATELET # BLD AUTO: 243 K/UL (ref 150–350)
PMV BLD AUTO: 10.6 FL (ref 9.2–12.9)
POTASSIUM SERPL-SCNC: 4 MMOL/L (ref 3.5–5.1)
PROT SERPL-MCNC: 8.3 G/DL (ref 6–8.4)
PROT UR QL STRIP: NEGATIVE
RBC # BLD AUTO: 4.12 M/UL (ref 4–5.4)
RBC #/AREA URNS HPF: 2 /HPF (ref 0–4)
SALICYLATES SERPL-MCNC: <5 MG/DL (ref 15–30)
SARS-COV-2 RDRP RESP QL NAA+PROBE: NEGATIVE
SODIUM SERPL-SCNC: 139 MMOL/L (ref 136–145)
SP GR UR STRIP: 1.02 (ref 1–1.03)
SQUAMOUS #/AREA URNS HPF: 1 /HPF
TOXICOLOGY INFORMATION: NORMAL
TSH SERPL DL<=0.005 MIU/L-ACNC: 1.29 UIU/ML (ref 0.4–4)
URN SPEC COLLECT METH UR: ABNORMAL
UROBILINOGEN UR STRIP-ACNC: NEGATIVE EU/DL
WBC # BLD AUTO: 12.77 K/UL (ref 3.9–12.7)
WBC #/AREA URNS HPF: 7 /HPF (ref 0–5)

## 2020-08-17 PROCEDURE — G0426 PR INPT TELEHEALTH CONSULT 50M: ICD-10-PCS | Mod: 95,,, | Performed by: PSYCHIATRY & NEUROLOGY

## 2020-08-17 PROCEDURE — 25000003 PHARM REV CODE 250: Performed by: EMERGENCY MEDICINE

## 2020-08-17 PROCEDURE — 93005 ELECTROCARDIOGRAM TRACING: CPT

## 2020-08-17 PROCEDURE — U0002 COVID-19 LAB TEST NON-CDC: HCPCS

## 2020-08-17 PROCEDURE — 80307 DRUG TEST PRSMV CHEM ANLYZR: CPT

## 2020-08-17 PROCEDURE — 80329 ANALGESICS NON-OPIOID 1 OR 2: CPT | Mod: 59

## 2020-08-17 PROCEDURE — 80320 DRUG SCREEN QUANTALCOHOLS: CPT

## 2020-08-17 PROCEDURE — 84443 ASSAY THYROID STIM HORMONE: CPT

## 2020-08-17 PROCEDURE — 99285 EMERGENCY DEPT VISIT HI MDM: CPT | Mod: 25

## 2020-08-17 PROCEDURE — 81000 URINALYSIS NONAUTO W/SCOPE: CPT | Mod: 59

## 2020-08-17 PROCEDURE — 36415 COLL VENOUS BLD VENIPUNCTURE: CPT

## 2020-08-17 PROCEDURE — G0426 INPT/ED TELECONSULT50: HCPCS | Mod: 95,,, | Performed by: PSYCHIATRY & NEUROLOGY

## 2020-08-17 PROCEDURE — 85025 COMPLETE CBC W/AUTO DIFF WBC: CPT

## 2020-08-17 PROCEDURE — 80053 COMPREHEN METABOLIC PANEL: CPT

## 2020-08-17 RX ORDER — LORAZEPAM 1 MG/1
1 TABLET ORAL
Status: COMPLETED | OUTPATIENT
Start: 2020-08-17 | End: 2020-08-17

## 2020-08-17 RX ORDER — ACETAMINOPHEN 500 MG
1000 TABLET ORAL
Status: COMPLETED | OUTPATIENT
Start: 2020-08-17 | End: 2020-08-17

## 2020-08-17 RX ADMIN — LORAZEPAM 1 MG: 1 TABLET ORAL at 05:08

## 2020-08-17 RX ADMIN — ACETAMINOPHEN 1000 MG: 500 TABLET ORAL at 08:08
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
## 2020-08-18 NOTE — CONSULTS
[facility]  Psychiatry  Telepsychiatry Consult Note    Please see previous notes: no prior psychiatric notes found in [facility] chart     Patient agreeable to consultation via telepsychiatry.    Tele-Consultation from Psychiatry started: 8/17/2020 at 7:08 PM  The chief complaint leading to psychiatric consultation is: HI  This consultation was requested by Dr. Sheets, the Emergency Department attending physician.  The location of the consulting psychiatrist is [place], [place]  The patient location is  [facility] EMERGENCY DEPARTMENT   The patient arrived at the ED at: unknown   Also present with the patient at the time of the consultation: nurse/tech    Patient Identification:   Queta Ocampo is a 75 y.o. female.    Patient information was obtained from patient, past medical records and ED MD.  Patient presented involuntarily to the Emergency Department via EMS    Inpatient consult to Psychiatry  Consult performed by: Tamir Valderrama MD  Consult ordered by: Lorenzo Sheets MD        Subjective:     Per ED MD:  Chief Complaint   Patient presents with    Psychiatric Evaluation       verbal altercation with family at home + for HI    The patient is a 75 y.o. female  who presents with psychiatric evaluation. Patient had a verbal altercation with her family at home PTA. Pt states she is angry and is having thoughts of hurting someone else. Pt also is suicidal. She has no plan for either. Pt denies having any weapons at home. She denies any previous suicidal ideation or attempt. No complaints at this time. PMHx of depression, nervous breakdown, type II DM, tachycardia, CAD, HTN and MI. SHx of appendectomy, tubal ligation, left shoulder surgery, cardiac surgery, left heart catheterization, coronary angiography.  The history is provided by the patient and the EMS personnel.     History of Present Illness:  Patient seen and chart reviewed.  Patient is a 75 year old female with a past medical hx as noted below and  a past psychiatric hx of depression, anxiety, and dementia who presents to the ED endorsing HI toward her daughter and son-in-law. On examination, she states that she has been struggling with recurrent depression since the early 2000s with recent worsening over the last several months (see ros below).  She also endorses a multi-year hx of TERI symptoms as noted below in ros.  She states that she wants to kill her daughter and son-in-law for taking away her special needs grandson from her.  She endorses that this was the final straw in the context of recent feelings of depression with isolation, hopelessness, and helplessness.  She made multiple comments about being OK with not being alive any longer.  She frequently perseverated on wanting to harm her daughter and son-in-law.  She denies any current medical/physical complaints.  She denies any AEs to her current outpatient medication regimen.  She endorses recent difficulty with ADLs in the context of her worsening depression.      Psychiatric Review Of Systems - Currently, the patient is endorsing and/or denying the following:    Patient endorses current symptoms of depression such as diminished mood or loss of interest/anhedonia; irritability, diminished energy, change in sleep, change in appetite, diminished concentration or cognition or indecisiveness, PMA/R, excessive guilt or hopelessness or worthlessness, and passive suicidal ideations    Patient endorses current trouble with sleep initiation & maintenance    Patient endorses current passive suicidal ideations as well as homicidal ideations toward daughter and son-in-law (denies any organized plans)    Patient endorses current symptoms of TERI such as excessive anxiety/worry/fear, more days than not, about numerous issues, difficult to control, with restlessness, fatigue, poor concentration, irritability, muscle tension, sleep disturbance; causes functionally impairing distress     Denies Symptoms of psychosis:  hallucinations, delusions, disorganized thinking, disorganized behavior or abnormal motor behavior, or negative symptoms (diminshed emotional expression, avolition, anhedonia, alogia, asociality     Denies Symptoms of eddi or hypomania: elevated, expansive, or irritable mood with increased energy or activity; with inflated self-esteem or grandiosity, decreased need for sleep, increased rate of speech, FOI or racing thoughts, distractibility, increased goal directed activity or PMA, risky/disinhibited behavior    Denies Symptoms of Panic Disorder: recurrent panic attacks, precipitated or un-precipitated, source of worry and/or behavioral changes secondary; with or without agoraphobia    Denies Symptoms of PTSD: h/o trauma; re-experiencing/intrusive symptoms, avoidant behavior, negative alterations in cognition or mood, or hyperarousal symptoms; with or without dissociative symptoms     Denies Symptoms of OCD: obsessions or compulsions     Denies Symptoms of Eating Disorders: anorexia, bulimia or binging    Denies symptoms of Substance Use/Abuse: intoxication, withdrawal, tolerance, used in larger amounts or duration than intended, unsuccessful attempts to limit or quit, increased time engaging in or seeking out, cravings or strong desire to use, failure to fulfill obligations, negative consequences in social/interpersonal/occupational,/recreational areas, use in dangerous situations, medical or psychological consequences       ROS  General ROS: negative for - chills, fatigue, fever or night sweats  Ophthalmic ROS: negative for - blurry vision, double vision or eye pain  ENT ROS: negative for - sinus pain, headaches, sore throat or visual changes  Allergy and Immunology ROS: negative for - hives, itchy/watery eyes or nasal congestion  Hematological and Lymphatic ROS: negative for - bleeding problems, bruising, jaundice or pallor  Endocrine ROS: negative for - galactorrhea, hot flashes, mood swings, palpitations or  "temperature intolerance  Respiratory ROS: negative for - cough, hemoptysis, shortness of breath, tachypnea or wheezing  Cardiovascular ROS: negative for - chest pain, dyspnea on exertion, loss of consciousness, palpitations, rapid heart rate or shortness of breath  Gastrointestinal ROS: negative for - appetite loss, nausea, abdominal pain, blood in stools, change in bowel habits, constipation or diarrhea  Genito-Urinary ROS: negative for - incontinence, nocturia or pelvic pain  Musculoskeletal ROS: negative for - joint stiffness, joint swelling, joint pain or muscle pain   Neurological ROS: negative for - behavioral changes, confusion, dizziness, memory loss, numbness/tingling or seizures  Dermatological ROS: negative for dry skin, hair changes, pruritus or rash  Psychiatric ROS: see detailed psychiatric ROS above in history section       Psychiatric History:   Previous Psychiatric Hospitalizations: Yes, at [facility] in [place] in 2017 for depression    Previous Medication Trials: Yes, currently on Prozac 40mg PO daily; hx of other unknown psychiatric medications    Previous Suicide Attempts: denies  History of Violence: yes  History of Depression: yes  History of Anxiety: yes   History of Kelly: denies  History of Auditory/Visual Hallucination: denies   History of Delusions: denies  Outpatient psychiatrist (current & past): yes, seekennedy Simental NP in [place], [place]    Substance Abuse History:  Tobacco: 1/2 PPD since teenage years   Alcohol: denies  Illicit Substances: denies (later endorses taking "medical marijuana" for chronic foot pain   Detox/Rehab: denies    Legal History: Past charges/incarcerations: No     Family Psychiatric History: daughter with schizophrenia and bipolar disorder ("from her dad's side")    Social History:  Developmental/Childhood:Achieved all developmental milestones timely  Education:High School Diploma; denies special education hx   Employment Status/Finances:Retired   Relationship " "Status/Sexual Orientation:    Children: 2  Housing Status: Home in [facility]    history:  denies  Access to gun: denies  Rastafari: Hindu   Recreational activities: "nothing, I've lost interest in everything"      Past Medical History:   Past Medical History:   Diagnosis Date    Coronary artery disease     Depression     Diabetes mellitus     type 2    Hypertension     MI (myocardial infarction)     Nervous breakdown     Renal disorder     Tachycardia       Past Surgical History:  Past Surgical History:   Procedure Laterality Date    APPENDECTOMY      CARDIAC SURGERY      PCI/stent 2/2020    CORONARY ANGIOGRAPHY N/A 2/14/2020    Procedure: ANGIOGRAM, CORONARY ARTERY;  Surgeon: Edwin Cervantes MD;  Location: [facility] CATH/EP LAB;  Service: Cardiology;  Laterality: N/A;    FOOT SURGERY      LEFT HEART CATHETERIZATION Left 2/14/2020    Procedure: Left heart cath;  Surgeon: Edwin Cervantes MD;  Location: [facility] CATH/EP LAB;  Service: Cardiology;  Laterality: Left;    SHOULDER SURGERY Left     TUBAL LIGATION       No current facility-administered medications on file prior to encounter.      Current Outpatient Medications on File Prior to Encounter   Medication Sig Dispense Refill    aspirin 81 MG Chew Take 1 tablet (81 mg total) by mouth once daily. 30 tablet 0    atorvastatin (LIPITOR) 80 MG tablet Take 1 tablet (80 mg total) by mouth every evening. 30 tablet 1    clopidogreL (PLAVIX) 75 mg tablet Take 1 tablet (75 mg total) by mouth once daily. 30 tablet 1    FLUoxetine 40 MG capsule Take 40 mg by mouth once daily.       gabapentin (NEURONTIN) 600 MG tablet Take 600 mg by mouth 3 (three) times daily.      metoprolol tartrate (LOPRESSOR) 25 MG tablet Take 0.5 tablets (12.5 mg total) by mouth 2 (two) times daily. 30 tablet 0    ondansetron (ZOFRAN-ODT) 8 MG TbDL Take 1 tablet (8 mg total) by mouth every 8 (eight) hours as needed. 15 tablet 1       Laboratory Data:   Labs " Reviewed   CBC W/ AUTO DIFFERENTIAL - Abnormal; Notable for the following components:       Result Value    WBC 12.77 (*)     Mean Corpuscular Volume 99 (*)     Mean Corpuscular Hemoglobin Conc 31.3 (*)     Gran # (ANC) 8.5 (*)     All other components within normal limits   COMPREHENSIVE METABOLIC PANEL - Abnormal; Notable for the following components:    BUN, Bld 29 (*)     Creatinine 1.5 (*)     ALT 5 (*)     eGFR if  39 (*)     eGFR if non  34 (*)     All other components within normal limits   URINALYSIS, REFLEX TO URINE CULTURE - Abnormal; Notable for the following components:    Occult Blood UA 1+ (*)     Leukocytes, UA 1+ (*)     All other components within normal limits    Narrative:     Specimen Source->Urine   ACETAMINOPHEN LEVEL - Abnormal; Notable for the following components:    Acetaminophen (Tylenol), Serum <3.0 (*)     All other components within normal limits   SALICYLATE LEVEL - Abnormal; Notable for the following components:    Salicylate Lvl <5.0 (*)     All other components within normal limits   URINALYSIS MICROSCOPIC - Abnormal; Notable for the following components:    WBC, UA 7 (*)     Bacteria Few (*)     Hyaline Casts, UA 13 (*)     All other components within normal limits    Narrative:     Specimen Source->Urine   DRUG SCREEN PANEL, URINE EMERGENCY    Narrative:     Specimen Source->Urine   ALCOHOL,MEDICAL (ETHANOL)   SARS-COV-2 RNA AMPLIFICATION, QUAL   TSH     Neurological History:  Seizures: denies  Head trauma: denies    Allergies:  Review of patient's allergies indicates:   Allergen Reactions    Codeine Itching, Swelling, Rash, Other (See Comments) and Nausea And Vomiting     Facial swelling, itching, rash, erythema    Pcn [penicillins] Itching, Swelling, Rash and Other (See Comments)     Facial swelling with rash, erythema, itching       Medications in ER:   Medications   LORazepam tablet 1 mg (1 mg Oral Given 8/17/20 2049)       Psychiatric  "Medications at home: Prozac 40mg PO daily    No new subjective & objective note has been filed under this hospital service since the last note was generated.    EXAMINATION    VITALS   Vitals:    08/17/20 1706   BP: 138/78   BP Location: Right arm   Patient Position: Lying   Pulse: 88   Resp: 16   Temp: 98.2 °F (36.8 °C)   TempSrc: Oral   SpO2: 100%   Weight: 65.8 kg (145 lb)   Height: 5' 8" (1.727 m)     CONSTITUTIONAL  General Appearance: NAD, unremarkable, age appropriate, normal weight, lying in bed    MUSCULOSKELETAL  Muscle Strength and Tone: WNL    Abnormal Involuntary Movements: none observed   Gait and Station: WNL; non-ataxic     PSYCHIATRIC   Arousal: alert  Sensorium/Orientation: oriented to person, place, situation, month of year, year  Behavior/Cooperation: cooperative, restless and fidgety , eye contact minimal   Speech: normal tone, normal rate, normal pitch, normal volume  Language: grossly intact, able to name, able to repeat  Mood: " pissed "   Affect: labile, irritable, angry and constricted  Thought Process: linear  Thought Content: no Loosening of Associations   Auditory hallucinations: NO  Visual hallucinations: NO  Paranoia: NO  Delusions:  NO  Suicidal ideation: YES: passive      Homicidal ideation: YES: toward daughter and son-in-law      Attention/Concentration:  spelled "WORLD" forwards but not backwards  Memory:    Recent:  Decreased   Remote: Decreased   3/3 immediate, 1/3 at 5 min  Fund of Knowledge: Aware of current events and Vocabulary appropriate    Abstract reasoning: similarities were abstract  Insight: limited  Judgment: limited/poor    CAM ICU Delirium Assessment - NEGATIVE      Assessment - Diagnosis - Goals:     Diagnosis/Impression:   MDD, recurrent, severe, with HI and passive SI  ETRI  Unspecified Neurocognitive Disorder with Behavioral Disturbances     Rec:   - Once medically cleared, continue PEC/CEC and seek inpatient [facility] admission for threat to self/others " and grave disability in the context of mental illness.    - Continue current outpatient regimen of Prozac; defer changes/additions to scheduled psychiatric regimen to inpatient psychiatric treatment team.    - Defer non-psychiatric medications to ED MD.    - Can use Zyprexa 5 mg PO/IM q6 hours PRN for non-redirectable psychotic/manic agitation (do not give within one hour of a benzodiazepine medication)    - Monitor patient with sitter while seeking inpatient [facility] admission      Time with patient: 50 minutes     More than 50% of the time was spent counseling/coordinating care    Consulting clinician was informed of the encounter and consult note.    Consultation ended: 8/17/2020 at 7:58 PM       Tamir Valderrama MD   Psychiatry  [facility]  08/17/2020

## 2020-08-18 NOTE — ED NOTES
Patient is resting in bed, no needs or questions at this time. Risk sitter is in the doorway with direct observation.   
"Queta Ocampo presents to the ED for a psych evaluation. Patient reports that she has HI thoughts after having a verbal altercation with family over her family taking her special needs grandson from her. Patient reports that she was in [place] in the past, but is unsure when. Patient is anxious and keeps stating, "I am pissed." "I am not going back to [place]. I just need someone to talk to, I need to get this shit off of me. I need someone to help me get rid of this anger. "       .     "
Assumed care from Ciara:  Queta Ocampo is appears asleep, resp even and unlabored, skin warm and dry, in NAD.  Risk sitter at door.  
Dr. Vega is aware that patient has c/o headache.   
Food Services is aware that patient needs a dinner tray. Patient is resting in bed, she is calm and cooperative. Risk sitter is in the doorway with direct observation.   
PEC has been scanned to the chart.   
Patient ambulated to the restroom without difficulty. Gait is steady.   
Patient has been updated on results. No needs or questions at this time. Patient is resting in bed. Risk sitter is in the doorway with direct observation.   
Patient is calm and cooperative, resting in bed. Risk sitter is in the doorway with direct observation.   
Patient is resting in bed with her eyes closed, chest rise is symmetrical, respirations are regular and unlabored. Risk sitter is in the doorway with direct observation.   
Patient is resting in bed. No needs or questions at this time. Risk sitter is in the doorway with direct observation.   
Patient is sitting up in bed, eating dinner. No needs or questions at this time.   
Report given to Chantel Burleson RN.   
Spoke with pt's daughter Rissa to advise of placement at [facility]. [phone]   
Tele psych has been started at the bedside.   
With the permission of patient, her daughter Rissa has been updated on plano of care.   
28-Sep-2018 21:49

## 2021-01-14 ENCOUNTER — TELEPHONE (OUTPATIENT)
Dept: CARDIOLOGY | Facility: CLINIC | Age: 76
End: 2021-01-14

## 2021-01-27 ENCOUNTER — OFFICE VISIT (OUTPATIENT)
Dept: URGENT CARE | Facility: CLINIC | Age: 76
End: 2021-01-27
Payer: MEDICARE

## 2021-01-27 VITALS
DIASTOLIC BLOOD PRESSURE: 80 MMHG | HEART RATE: 63 BPM | OXYGEN SATURATION: 97 % | RESPIRATION RATE: 17 BRPM | SYSTOLIC BLOOD PRESSURE: 132 MMHG | HEIGHT: 68 IN | TEMPERATURE: 98 F | WEIGHT: 143 LBS | BODY MASS INDEX: 21.67 KG/M2

## 2021-01-27 DIAGNOSIS — N30.00 ACUTE CYSTITIS WITHOUT HEMATURIA: ICD-10-CM
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
## 2021-01-27 DIAGNOSIS — S20.211A CONTUSION OF RIGHT CHEST WALL, INITIAL ENCOUNTER: ICD-10-CM

## 2021-01-27 LAB
BILIRUB UR QL STRIP: NEGATIVE
GLUCOSE UR QL STRIP: NEGATIVE
KETONES UR QL STRIP: NEGATIVE
LEUKOCYTE ESTERASE UR QL STRIP: NEGATIVE
PH, POC UA: 5.5
POC BLOOD, URINE: NEGATIVE
POC NITRATES, URINE: NEGATIVE
PROT UR QL STRIP: POSITIVE
SP GR UR STRIP: 1.02 (ref 1–1.03)
UROBILINOGEN UR STRIP-ACNC: NORMAL (ref 0.1–1.1)

## 2021-01-27 PROCEDURE — 81003 POCT URINALYSIS, DIPSTICK, AUTOMATED, W/O SCOPE: ICD-10-PCS | Mod: QW,S$GLB,, | Performed by: EMERGENCY MEDICINE

## 2021-01-27 PROCEDURE — 99203 PR OFFICE/OUTPT VISIT, NEW, LEVL III, 30-44 MIN: ICD-10-PCS | Mod: 25,S$GLB,, | Performed by: EMERGENCY MEDICINE

## 2021-01-27 PROCEDURE — 99203 OFFICE O/P NEW LOW 30 MIN: CPT | Mod: 25,S$GLB,, | Performed by: EMERGENCY MEDICINE

## 2021-01-27 PROCEDURE — 81003 URINALYSIS AUTO W/O SCOPE: CPT | Mod: QW,S$GLB,, | Performed by: EMERGENCY MEDICINE

## 2021-01-27 RX ORDER — NITROFURANTOIN 25; 75 MG/1; MG/1
100 CAPSULE ORAL 2 TIMES DAILY
Qty: 20 CAPSULE | Refills: 0 | Status: SHIPPED | OUTPATIENT
Start: 2021-01-27

## 2021-01-27 RX ORDER — OXYCODONE AND ACETAMINOPHEN 5; 325 MG/1; MG/1
1 TABLET ORAL EVERY 4 HOURS PRN
Qty: 18 TABLET | Refills: 0 | Status: SHIPPED | OUTPATIENT
Start: 2021-01-27

## 2021-05-01 ENCOUNTER — HOSPITAL ENCOUNTER (EMERGENCY)
Facility: HOSPITAL | Age: 76
Discharge: HOME | End: 2021-05-01
Attending: EMERGENCY MEDICINE
Payer: MEDICARE

## 2021-05-01 VITALS
HEIGHT: 68 IN | SYSTOLIC BLOOD PRESSURE: 194 MMHG | WEIGHT: 143 LBS | BODY MASS INDEX: 21.67 KG/M2 | DIASTOLIC BLOOD PRESSURE: 91 MMHG | HEART RATE: 76 BPM | RESPIRATION RATE: 18 BRPM | OXYGEN SATURATION: 94 % | TEMPERATURE: 98 F

## 2021-05-01 DIAGNOSIS — R05.9 COUGH: ICD-10-CM

## 2021-05-01 DIAGNOSIS — R11.2 NON-INTRACTABLE VOMITING WITH NAUSEA, UNSPECIFIED VOMITING TYPE: Primary | ICD-10-CM

## 2021-05-01 LAB
ALBUMIN SERPL BCP-MCNC: 4 G/DL (ref 3.5–5.2)
ALP SERPL-CCNC: 94 U/L (ref 55–135)
ALT SERPL W/O P-5'-P-CCNC: 13 U/L (ref 10–44)
ANION GAP SERPL CALC-SCNC: 15 MMOL/L (ref 8–16)
AST SERPL-CCNC: 16 U/L (ref 10–40)
BACTERIA #/AREA URNS HPF: ABNORMAL /HPF
BASOPHILS # BLD AUTO: 0.03 K/UL (ref 0–0.2)
BASOPHILS NFR BLD: 0.3 % (ref 0–1.9)
BILIRUB SERPL-MCNC: 0.4 MG/DL (ref 0.1–1)
BILIRUB UR QL STRIP: NEGATIVE
BNP SERPL-MCNC: 260 PG/ML (ref 0–99)
BUN SERPL-MCNC: 28 MG/DL (ref 8–23)
CALCIUM SERPL-MCNC: 10.4 MG/DL (ref 8.7–10.5)
CHLORIDE SERPL-SCNC: 102 MMOL/L (ref 95–110)
CLARITY UR: CLEAR
CO2 SERPL-SCNC: 25 MMOL/L (ref 23–29)
COLOR UR: YELLOW
CREAT SERPL-MCNC: 1.1 MG/DL (ref 0.5–1.4)
DIFFERENTIAL METHOD: ABNORMAL
EOSINOPHIL # BLD AUTO: 0.1 K/UL (ref 0–0.5)
EOSINOPHIL NFR BLD: 1.5 % (ref 0–8)
ERYTHROCYTE [DISTWIDTH] IN BLOOD BY AUTOMATED COUNT: 13.9 % (ref 11.5–14.5)
EST. GFR  (AFRICAN AMERICAN): 56 ML/MIN/1.73 M^2
EST. GFR  (NON AFRICAN AMERICAN): 49 ML/MIN/1.73 M^2
GLUCOSE SERPL-MCNC: 150 MG/DL (ref 70–110)
GLUCOSE UR QL STRIP: NEGATIVE
HCT VFR BLD AUTO: 41 % (ref 37–48.5)
HGB BLD-MCNC: 13.6 G/DL (ref 12–16)
HGB UR QL STRIP: NEGATIVE
HYALINE CASTS #/AREA URNS LPF: 1 /LPF
IMM GRANULOCYTES # BLD AUTO: 0.02 K/UL (ref 0–0.04)
IMM GRANULOCYTES NFR BLD AUTO: 0.2 % (ref 0–0.5)
KETONES UR QL STRIP: NEGATIVE
LEUKOCYTE ESTERASE UR QL STRIP: NEGATIVE
LIPASE SERPL-CCNC: 38 U/L (ref 4–60)
LYMPHOCYTES # BLD AUTO: 3.4 K/UL (ref 1–4.8)
LYMPHOCYTES NFR BLD: 35.8 % (ref 18–48)
MCH RBC QN AUTO: 30.4 PG (ref 27–31)
MCHC RBC AUTO-ENTMCNC: 33.2 G/DL (ref 32–36)
MCV RBC AUTO: 92 FL (ref 82–98)
MICROSCOPIC COMMENT: ABNORMAL
MONOCYTES # BLD AUTO: 0.8 K/UL (ref 0.3–1)
MONOCYTES NFR BLD: 8.5 % (ref 4–15)
NEUTROPHILS # BLD AUTO: 5 K/UL (ref 1.8–7.7)
NEUTROPHILS NFR BLD: 53.7 % (ref 38–73)
NITRITE UR QL STRIP: NEGATIVE
NRBC BLD-RTO: 0 /100 WBC
PH UR STRIP: 6 [PH] (ref 5–8)
PLATELET # BLD AUTO: 143 K/UL (ref 150–450)
PMV BLD AUTO: 11.9 FL (ref 9.2–12.9)
POTASSIUM SERPL-SCNC: 4.7 MMOL/L (ref 3.5–5.1)
PROT SERPL-MCNC: 8.2 G/DL (ref 6–8.4)
PROT UR QL STRIP: ABNORMAL
RBC # BLD AUTO: 4.47 M/UL (ref 4–5.4)
RBC #/AREA URNS HPF: 1 /HPF (ref 0–4)
SARS-COV-2 RDRP RESP QL NAA+PROBE: NEGATIVE
SODIUM SERPL-SCNC: 142 MMOL/L (ref 136–145)
SP GR UR STRIP: 1.02 (ref 1–1.03)
SQUAMOUS #/AREA URNS HPF: 2 /HPF
TROPONIN I SERPL DL<=0.01 NG/ML-MCNC: 0.01 NG/ML (ref 0–0.03)
URN SPEC COLLECT METH UR: ABNORMAL
UROBILINOGEN UR STRIP-ACNC: NEGATIVE EU/DL
WBC # BLD AUTO: 9.36 K/UL (ref 3.9–12.7)
WBC #/AREA URNS HPF: 8 /HPF (ref 0–5)

## 2021-05-01 PROCEDURE — 83690 ASSAY OF LIPASE: CPT | Performed by: EMERGENCY MEDICINE

## 2021-05-01 PROCEDURE — 83880 ASSAY OF NATRIURETIC PEPTIDE: CPT | Performed by: EMERGENCY MEDICINE

## 2021-05-01 PROCEDURE — 36415 COLL VENOUS BLD VENIPUNCTURE: CPT | Performed by: EMERGENCY MEDICINE

## 2021-05-01 PROCEDURE — 63600175 PHARM REV CODE 636 W HCPCS: Performed by: EMERGENCY MEDICINE

## 2021-05-01 PROCEDURE — 84484 ASSAY OF TROPONIN QUANT: CPT | Performed by: EMERGENCY MEDICINE

## 2021-05-01 PROCEDURE — 96374 THER/PROPH/DIAG INJ IV PUSH: CPT

## 2021-05-01 PROCEDURE — 85025 COMPLETE CBC W/AUTO DIFF WBC: CPT | Performed by: EMERGENCY MEDICINE

## 2021-05-01 PROCEDURE — 99284 EMERGENCY DEPT VISIT MOD MDM: CPT | Mod: 25

## 2021-05-01 PROCEDURE — 81000 URINALYSIS NONAUTO W/SCOPE: CPT | Performed by: EMERGENCY MEDICINE

## 2021-05-01 PROCEDURE — U0002 COVID-19 LAB TEST NON-CDC: HCPCS | Performed by: EMERGENCY MEDICINE

## 2021-05-01 PROCEDURE — 80053 COMPREHEN METABOLIC PANEL: CPT | Performed by: EMERGENCY MEDICINE

## 2021-05-01 RX ORDER — ONDANSETRON 4 MG/1
4 TABLET, ORALLY DISINTEGRATING ORAL EVERY 6 HOURS PRN
Qty: 8 TABLET | Refills: 0 | Status: SHIPPED | OUTPATIENT
Start: 2021-05-01

## 2021-05-01 RX ORDER — ONDANSETRON 2 MG/ML
4 INJECTION INTRAMUSCULAR; INTRAVENOUS
Status: COMPLETED | OUTPATIENT
Start: 2021-05-01 | End: 2021-05-01

## 2021-05-01 RX ADMIN — ONDANSETRON 4 MG: 2 INJECTION INTRAMUSCULAR; INTRAVENOUS at 06:05

## 2022-06-06 NOTE — DISCHARGE SUMMARY
[facility] Hospital Medicine  Discharge Summary      Patient Name: Queta Ocampo  MRN: [id number]  Admission Date: 6/16/2020  Hospital Length of Stay: 0 days  Discharge Date and Time: 6/17/2020  2:12 PM  Attending Physician: Cristal att. providers found   Discharging Provider: Nona Garcia NP  Primary Care Provider: Jean Alexander MD      HPI:   Queta Ocampo is a 75-year-old female with a past medical history of coronary artery disease, depression, hypertension, and status post coronary stent placement from MI in February 2020 who presents to the ER [facility] with reports of CP. Patient states since her MI she experiences intermittent CP, however this AM she began to develop CP that was increased in severity.  Patient states the pain was located to her left anterior chest wall, radiated to her left shoulder, sharp in characteristic.  Patient reports that the pain spontaneously resolved, states she did not take any over-the-counter medication or nitroglycerin prior to arrival to emergency room.  Patient also endorses shortness of breath that developed this morning in association with the chest pain.  Patient underwent a cardiac catheterization in February 2020 per Dr. Harmon, requiring stent placement.  Patient states approximately 2 months ago she stopped taking all of her medications except for her Prozac and gabapentin.  When asked the reason behind her noncompliance with medication regimen patient states I just did not want to take them.  Patient states however she did take 1 dose of metoprolol today when her chest pain increased in severity.  In the emergency room patient noted to have mildly elevated troponin.  Patient placed in observation on 06/16/2020 at approximately 10:30 p.m..  Patient states that she resides at home with her grandson, denies use of supplemental oxygen or ambulatory assist devices.  Patient endorses smoking 1 pack of tobacco daily.    * No surgery found *       Hospital Course:   Patient monitor closely during observation stay.  Troponin trended and normalized.  Cardiology consulted for evaluation.  Patient states she had not been taking her Plavix or aspirin after her recent cardiac stent placement.  She was given new prescriptions for her home medications.  She is stable for discharge from a cardiology standpoint with outpatient follow-up.  She denies chest pain since admission.    Chest clear auscultation, heart regular rate and rhythm with systolic murmur     Consults:     No new Assessment & Plan notes have been filed under this hospital service since the last note was generated.  Service: Hospital Medicine    Final Active Diagnoses:    Diagnosis Date Noted POA    PRINCIPAL PROBLEM:  Chest pain [R07.9] 06/16/2020 Yes    Elevated troponin [R79.89] 06/16/2020 Yes    Normocytic anemia [D64.9] 06/16/2020 Yes    Noncompliance with medication regimen [Z91.14] 06/16/2020 Not Applicable    Tobacco abuse [Z72.0] 06/16/2020 Yes    HLD (hyperlipidemia) [E78.5] 06/16/2020 Yes    Chronic kidney disease, stage III (moderate) [N18.3] 02/26/2020 Yes    HTN (hypertension) [I10] 02/14/2020 Yes    Depression [F32.9] 04/09/2019 Yes      Problems Resolved During this Admission:       Discharged Condition: stable    Disposition: Home or Self Care    Follow Up:  Follow-up Information     Jean Alexander MD. Go on 6/24/2020.    Specialty: Family Medicine  Why: 10:00 AM for hospital follow up  Contact information:  [address]  [place] [address]  [phone]             Edwin Cervantes MD In 1 week.    Specialties: Cardiology, INTERVENTIONAL CARDIOLOGY  Contact information:  [facility] [facility]  [address]  [facility]  [facility] [address]  [phone]                 Patient Instructions:      Ambulatory referral/consult to Smoking Cessation Program   Standing Status: Future   Referral Priority: Routine Referral Type:  Consultation   Referral Reason: Specialty Services Required   Requested Specialty: CTTS   Number of Visits Requested: 1     Notify your health care provider if you experience any of the following:  persistent dizziness, light-headedness, or visual disturbances     Notify your health care provider if you experience any of the following:  worsening rash     Notify your health care provider if you experience any of the following:  severe uncontrolled pain     Notify your health care provider if you experience any of the following:  persistent nausea and vomiting or diarrhea     Notify your health care provider if you experience any of the following:  temperature >100.4     Notify your health care provider if you experience any of the following:  redness, tenderness, or signs of infection (pain, swelling, redness, odor or green/yellow discharge around incision site)     Notify your health care provider if you experience any of the following:  difficulty breathing or increased cough     Activity as tolerated       Significant Diagnostic Studies: Labs:   BMP:   Recent Labs   Lab 06/16/20 2011 06/17/20  0054    194*    139   K 4.3 3.8    106   CO2 23 21*   BUN 26* 25*   CREATININE 1.1 1.2   CALCIUM 8.7 8.4*   MG  --  1.8   , CMP   Recent Labs   Lab 06/16/20 2011 06/17/20  0054    139   K 4.3 3.8    106   CO2 23 21*    194*   BUN 26* 25*   CREATININE 1.1 1.2   CALCIUM 8.7 8.4*   PROT  --  6.0   ALBUMIN  --  2.8*   BILITOT  --  0.1   ALKPHOS  --  99   AST  --  28   ALT  --  25   ANIONGAP 11 12   ESTGFRAFRICA 57* 51*   EGFRNONAA 49* 44*   , Lipid Panel No results found for: CHOL, HDL, LDLCALC, TRIG, CHOLHDL and Troponin   Recent Labs   Lab 06/17/20  0748   TROPONINI 0.025       Pending Diagnostic Studies:     Procedure Component Value Units Date/Time    EKG 12-lead [[id number]]     Order Status: Sent Lab Status: No result          Medications:  Reconciled Home Medications:       Medication List      START taking these medications    atorvastatin 80 MG tablet  Commonly known as: LIPITOR  Take 1 tablet (80 mg total) by mouth every evening.     clopidogreL 75 mg tablet  Commonly known as: PLAVIX  Take 1 tablet (75 mg total) by mouth once daily.        CONTINUE taking these medications    aspirin 81 MG Chew  Take 1 tablet (81 mg total) by mouth once daily.     FLUoxetine 40 MG capsule  Take 40 mg by mouth once daily.     gabapentin 600 MG tablet  Commonly known as: NEURONTIN  Take 600 mg by mouth 3 (three) times daily.     metoprolol tartrate 25 MG tablet  Commonly known as: LOPRESSOR  Take 0.5 tablets (12.5 mg total) by mouth 2 (two) times daily.     ondansetron 8 MG Tbdl  Commonly known as: ZOFRAN-ODT  Take 1 tablet (8 mg total) by mouth every 8 (eight) hours as needed.            Indwelling Lines/Drains at time of discharge:   Lines/Drains/Airways     None                 Time spent on the discharge of patient: 60 minutes    Long discussion with patient regarding complaints of medication.  Discussed the importance of taking Plavix aspirin post stent placement it does not take this medication she is at high risk for InStent thrombosis resulting in MI.  Patient was seen and examined on the date of discharge and determined to be suitable for discharge.         Nona Garcia NP  Department of Hospital Medicine  [facility]   cane/needs device

## 2023-02-05 NOTE — ASSESSMENT & PLAN NOTE
02/05/23 1414   OTHER   Discipline physical therapy assistant   Rehab Time/Intention   Session Not Performed patient unavailable for treatment  (Checked on pt. x 2 and pt. sleeping and nsg. asked to not wake up pt.)        Stable chronic issue
